# Patient Record
Sex: FEMALE | Race: BLACK OR AFRICAN AMERICAN | NOT HISPANIC OR LATINO | Employment: FULL TIME | URBAN - METROPOLITAN AREA
[De-identification: names, ages, dates, MRNs, and addresses within clinical notes are randomized per-mention and may not be internally consistent; named-entity substitution may affect disease eponyms.]

---

## 2018-03-09 ENCOUNTER — TELEPHONE (OUTPATIENT)
Dept: FAMILY MEDICINE | Facility: CLINIC | Age: 28
End: 2018-03-09

## 2018-03-09 NOTE — TELEPHONE ENCOUNTER
----- Message from Melody Mckenzie MD sent at 3/9/2018 12:00 PM CST -----  Contact: self/[phone]      ----- Message -----  From: Jes Walker MA  Sent: 3/8/2018  11:14 AM  To: Melody Mckenzie MD        ----- Message -----  From: Iesha Elizabeth  Sent: 3/8/2018  10:43 AM  To: Jonah Oliver A Staff    Patient called to become a new patient of Dr. Mckenzie's.      Please call and advise.

## 2018-03-14 ENCOUNTER — OFFICE VISIT (OUTPATIENT)
Dept: FAMILY MEDICINE | Facility: CLINIC | Age: 28
End: 2018-03-14
Payer: COMMERCIAL

## 2018-03-14 VITALS
TEMPERATURE: 98 F | HEIGHT: 67 IN | HEART RATE: 100 BPM | DIASTOLIC BLOOD PRESSURE: 78 MMHG | OXYGEN SATURATION: 97 % | SYSTOLIC BLOOD PRESSURE: 118 MMHG | WEIGHT: 212.75 LBS | BODY MASS INDEX: 33.39 KG/M2

## 2018-03-14 DIAGNOSIS — M25.571 CHRONIC PAIN OF RIGHT ANKLE: ICD-10-CM

## 2018-03-14 DIAGNOSIS — F32.81 PREMENSTRUAL DYSPHORIC DISORDER: ICD-10-CM

## 2018-03-14 DIAGNOSIS — G89.29 CHRONIC PAIN OF RIGHT ANKLE: ICD-10-CM

## 2018-03-14 DIAGNOSIS — Z00.00 ANNUAL PHYSICAL EXAM: Primary | ICD-10-CM

## 2018-03-14 PROCEDURE — 99999 PR PBB SHADOW E&M-EST. PATIENT-LVL IV: CPT | Mod: PBBFAC,,, | Performed by: FAMILY MEDICINE

## 2018-03-14 PROCEDURE — 99385 PREV VISIT NEW AGE 18-39: CPT | Mod: S$GLB,,, | Performed by: FAMILY MEDICINE

## 2018-03-14 NOTE — PATIENT INSTRUCTIONS
"  Managing PMS: Diet and Nutrition     Nutrients in fresh fruits and vegetables can help you manage PMS.     Maintaining a healthy diet helps your body counter PMS. Certain foods boost serotonin levels and give you the energy to cope with symptoms. Other foods can be avoided to ease symptoms.  About supplements  Ask your healthcare provider about supplements before trying them.   Benefits of a balanced diet  To counter PMS symptoms, maintain a balanced diet. Eat foods from all the food groups: dairy, grains, fruits and vegetables, and protein. When planning meals, know that:  · Calcium may ease mood swings, headache, bloating, and irritability. It's found in dairy products such as milk, cheese, and yogurt. Some juices, breads, cereals, and soy products have calcium added (fortified).  · Magnesium may relieve bloating and breast tenderness. It's found in many foods, including fresh fruits and vegetables. To help your body get enough magnesium, eat 5 or more servings of a variety of fruits and vegetables a day.  · Vitamin B6 helps the body use serotonin, thereby helping to ease depression. It's found in chicken, fish, potatoes, eggs, and carrots.  · Vitamin E may reduce headache and breast tenderness. It's found in nuts such as almonds, peanuts, and hazelnuts. It's also found in green leafy vegetables.  "Good mood" foods  Eating foods high in carbohydrates (carbs) and fiber can help you manage PMS. That's because carbs raise serotonin levels. Carbs are also your body's main source of energy. To help keep energy and serotonin levels steady, eat small amounts throughout the day. High-fiber carbs include:  · Whole-grain foods. Brown rice, whole-wheat pasta, whole-grain bread, and buckwheat noodles are good choices.  · Fresh fruits and vegetables, especially when eaten unpeeled.  · Beans and legumes, such as kidney beans, peas, and lentils.  Foods to limit  Some foods can make PMS symptoms worse. Know that:  · Salt can " cause bloating. Since canned vegetables are often high in salt, buy fresh instead. Flavor with herbs, lemon, or salt-free seasonings.  · Sugar is a carb that provides only short bursts of energy. If you crave sugar, choose a food that's also high in fiber, like an unpeeled apple or a bran muffin.  · Caffeine can disrupt sleep, which makes symptoms harder to cope with. Caffeine can also cause breast tenderness. Try to limit chocolate and caffeinated drinks, such as coffee or soda.  · Alcohol can make you feel depressed and can disrupt sleep. Many kinds of alcohol are also high in sugar. You may try limiting the amount of alcohol you drink.  Date Last Reviewed: 3/1/2017  © 3838-5099 [employer]. [address] [place] [address], [place] [address]. All rights reserved. This information is not intended as a substitute for professional medical care. Always follow your healthcare professional's instructions.        Managing PMS: Lifestyle Changes    Coping with PMS takes energy. But, PMS symptoms can make you feel like you dont have the strength to cope. The trick is to work helpful strategies into your daily life. Be active during the day and get enough sleep at night. Take time to relax. And dont be afraid to ask for support.  Being active  · Activity raises the amount of oxygen in your body. This makes you feel better and gives you more energy. Exercise may also raise serotonin levels. For best results:  ¨ Try aerobic activities, such as walking, jogging, biking, swimming, or yoga.  ¨ Exercise for 30 minutes, most days of the week. If this seems like too much, start with 10 minutes a day and work your way up.  ¨ Find ways to fit activity into your day. Try taking the stairs instead of the elevator.  Sleeping well  When youre tired, PMS symptoms can be harder to cope with. Aim for at least 8 hours of sleep a night. To sleep better:  · Follow a routine before bed. For instance, brush your teeth, read for half an  hour, and turn out the lights at 10 p.m. every night.  · Pull down window shades and keep pets out of the bedroom. If youre a light sleeper, try wearing earplugs and an eye mask to block out noise and light.  Taking time to relax  Being relaxed can give you the energy to deal with lifes ups and downs. This makes even PMS symptoms easier to cope with. Learn to relax through simple techniques you can do anytime, anywhere. If you think youre too busy, start with just 5 minutes a day. Try:  · Taking in a slow, deep breath through your nose. Hold it for 5 counts, then exhale through your mouth. Repeat this 3 times.  · Picturing yourself in a peaceful place, such as the countryside. Explore with your mind. Hear birds. Smell freshly cut grass. Enjoy a mental vacation.  · Stretching to relax muscles and reduce aches. (If you have back problems, ask your healthcare provider about stretches that are safe for you.)  Finding support  You dont have to deal with PMS alone. To help you cope:  · Talk to family, friends, and coworkers.  · Let them know how they can help when youre dealing with PMS symptoms.  · Chat with female friends. Support each other. You may learn some new coping strategies.  · Join a support group for women with PMS. Or try a stress management group. Ask your healthcare provider for resources.  Date Last Reviewed: 2/1/2017  © 2775-1415 [employer]. [address] [place] [address], [place] [address]. All rights reserved. This information is not intended as a substitute for professional medical care. Always follow your healthcare professional's instructions.

## 2018-03-14 NOTE — PROGRESS NOTES
Subjective:       Patient ID: Dora Zamarripa is a 28 y.o. female.    Chief Complaint: Establish Care    HPI   29 yo female pt with obesity presents to establish care and for an annual exam. LMP 3/4/18. Menses q month x 2 years. Prior to birth of her child 2 years ago, had irregular menses. Pt has PCOS and was treated with Metformin. . Is sexually active with her . Has a 2 year old son. Last Pap smear 2 years ago. Is followed by Dr. Radha Short for gyn care. Next visit 2018. Is not using contraception.  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 Would like to pursue a career in dentistry.  Pt has history of migraines. No recent migraine headaches.  Pt notes intermittent challenges with her right ankle. Has pain with prolonged standing. Does not take any medications for this evaluation. Has been evaluated by Podiatry, Ortho and Sports Medicine for the right ankle problems. No relief with an ankle brace.  Social History: No tobacco. Occasional ETOH. No drug use.  Health Maintenance: received Tdap   Review of Systems   Constitutional: Negative for activity change, appetite change, chills, fever and unexpected weight change.   HENT: Positive for ear pain. Negative for congestion, hearing loss, postnasal drip, rhinorrhea, sneezing and trouble swallowing.    Eyes: Negative for pain, discharge, redness, itching and visual disturbance.   Respiratory: Positive for cough. Negative for shortness of breath and wheezing.         Intermittent cough since URI 2 weeks ago.   Cardiovascular: Negative for chest pain, palpitations and leg swelling.   Gastrointestinal: Negative for abdominal pain, anal bleeding, blood in stool, constipation, diarrhea, nausea and vomiting.   Endocrine: Negative for cold intolerance, heat intolerance, polydipsia, polyphagia and polyuria.   Genitourinary: Positive for menstrual problem. Negative for dysuria, genital sores, hematuria, vaginal bleeding, vaginal discharge  and vaginal pain.        Has decreased activity, motivation and appetite on the first 2 days of each menses. Often has to miss work due to her symptoms.   Musculoskeletal: Positive for arthralgias and joint swelling. Negative for myalgias.   Allergic/Immunologic: Negative for environmental allergies, food allergies and immunocompromised state.   Neurological: Positive for headaches. Negative for dizziness, seizures, syncope, weakness and light-headedness.   Psychiatric/Behavioral: Positive for dysphoric mood. Negative for self-injury, sleep disturbance and suicidal ideas. The patient is nervous/anxious.        Objective:      Physical Exam   Constitutional: She is oriented to person, place, and time. She appears well-developed and well-nourished. No distress.   HENT:   Head: Normocephalic and atraumatic.   Right Ear: Hearing, tympanic membrane, external ear and ear canal normal.   Left Ear: Hearing, tympanic membrane, external ear and ear canal normal.   Nose: Nose normal. Right sinus exhibits no maxillary sinus tenderness and no frontal sinus tenderness. Left sinus exhibits no maxillary sinus tenderness and no frontal sinus tenderness.   Mouth/Throat: Uvula is midline, oropharynx is clear and moist and mucous membranes are normal.   Eyes: Conjunctivae and EOM are normal. Pupils are equal, round, and reactive to light. Right eye exhibits no discharge. Left eye exhibits no discharge.   Neck: Normal range of motion. Neck supple. No JVD present. No thyromegaly present.   Cardiovascular: Normal rate, regular rhythm, normal heart sounds and intact distal pulses.    No murmur heard.  Pulmonary/Chest: Effort normal and breath sounds normal. She has no wheezes. She has no rales.   Abdominal: Soft. Bowel sounds are normal. She exhibits no mass. There is no tenderness.   Musculoskeletal: Normal range of motion. She exhibits no edema or deformity.   Lymphadenopathy:     She has no cervical adenopathy.   Neurological: She is  alert and oriented to person, place, and time. She has normal strength and normal reflexes. No sensory deficit.   Skin: Skin is warm and dry.   Psychiatric: She has a normal mood and affect.   Vitals reviewed.      Assessment:         See plan  Plan:       Dora was seen today for establish care.    Diagnoses and all orders for this visit:    Annual physical exam  -     TSH; Future  -     CBC auto differential; Future  -     Lipid panel; Future  -     Comprehensive metabolic panel; Future    BMI 33.0-33.9,adult  Weight loss advised. Dietary and exercise counseling done. Pt to target 10% weight loss.    Chronic pain of right ankle  -     Ambulatory referral to Orthopedics (Dr. Akhtar)    Premenstrual dysphoric disorder  - Pt defers starting an SSRI for treatment of PMDD. Pt to discuss treatment with Gyn, Dr. Short at her visit in April 2018. Diet and lifestyle changes discussed with patient.    F/U in 3 months.

## 2018-03-19 ENCOUNTER — OFFICE VISIT (OUTPATIENT)
Dept: URGENT CARE | Facility: CLINIC | Age: 28
End: 2018-03-19
Payer: COMMERCIAL

## 2018-03-19 VITALS
OXYGEN SATURATION: 97 % | BODY MASS INDEX: 33.12 KG/M2 | HEART RATE: 97 BPM | SYSTOLIC BLOOD PRESSURE: 106 MMHG | DIASTOLIC BLOOD PRESSURE: 73 MMHG | RESPIRATION RATE: 19 BRPM | WEIGHT: 211 LBS | TEMPERATURE: 98 F | HEIGHT: 67 IN

## 2018-03-19 DIAGNOSIS — R31.9 URINARY TRACT INFECTION WITH HEMATURIA, SITE UNSPECIFIED: Primary | ICD-10-CM

## 2018-03-19 DIAGNOSIS — N39.0 URINARY TRACT INFECTION WITH HEMATURIA, SITE UNSPECIFIED: Primary | ICD-10-CM

## 2018-03-19 LAB
B-HCG UR QL: NEGATIVE
BILIRUB UR QL STRIP: NEGATIVE
CTP QC/QA: YES
GLUCOSE UR QL STRIP: NEGATIVE
KETONES UR QL STRIP: NEGATIVE
LEUKOCYTE ESTERASE UR QL STRIP: POSITIVE
PH, POC UA: 7.5 (ref 5–8)
POC BLOOD, URINE: POSITIVE
POC NITRATES, URINE: NEGATIVE
PROT UR QL STRIP: POSITIVE
SP GR UR STRIP: 1.01 (ref 1–1.03)
UROBILINOGEN UR STRIP-ACNC: NORMAL (ref 0.1–1.1)

## 2018-03-19 PROCEDURE — 99214 OFFICE O/P EST MOD 30 MIN: CPT | Mod: 25,S$GLB,, | Performed by: NURSE PRACTITIONER

## 2018-03-19 PROCEDURE — 81003 URINALYSIS AUTO W/O SCOPE: CPT | Mod: QW,S$GLB,, | Performed by: NURSE PRACTITIONER

## 2018-03-19 PROCEDURE — 81025 URINE PREGNANCY TEST: CPT | Mod: S$GLB,,, | Performed by: NURSE PRACTITIONER

## 2018-03-19 RX ORDER — NITROFURANTOIN 25; 75 MG/1; MG/1
100 CAPSULE ORAL 2 TIMES DAILY
Qty: 14 CAPSULE | Refills: 0 | Status: SHIPPED | OUTPATIENT
Start: 2018-03-19 | End: 2018-03-26

## 2018-03-19 NOTE — PROGRESS NOTES
"Subjective:       Patient ID: Dora Zamarripa is a 28 y.o. female.    Vitals:  height is 5' 7" (1.702 m) and weight is 95.7 kg (211 lb). Her oral temperature is 97.6 °F (36.4 °C). Her blood pressure is 106/73 and her pulse is 97. Her respiration is 19 and oxygen saturation is 97%.     Chief Complaint: Urinary Tract Infection    Patient with abdominal pain x 3 days. Pain is in her pelvic area. Patient also with rt side back pain. Patient reports urinary sxs such as dysuria, urinary urgency, and frequency. Patient stating she had chills Saturday night. Patient denies hx of kidney stones but reports hx of kidney infection years ago.       Urinary Tract Infection    This is a new problem. The current episode started acute onset. The problem occurs intermittently. The problem has been gradually worsening. The quality of the pain is described as burning. Associated symptoms include chills, frequency and urgency. Pertinent negatives include no hematuria, nausea or vomiting. Treatments tried: Azo. The treatment provided mild relief.     Review of Systems   Constitution: Positive for chills. Negative for fever.   Skin: Negative for itching.   Musculoskeletal: Positive for back pain.   Gastrointestinal: Positive for abdominal pain. Negative for nausea and vomiting.   Genitourinary: Positive for dysuria, frequency and urgency. Negative for genital sores, hematuria, missed menses and non-menstrual bleeding.       Objective:      Physical Exam   Constitutional: She is oriented to person, place, and time. She appears well-developed and well-nourished.   HENT:   Head: Normocephalic and atraumatic.   Right Ear: External ear normal.   Left Ear: External ear normal.   Nose: Nose normal. No nasal deformity. No epistaxis.   Mouth/Throat: Oropharynx is clear and moist and mucous membranes are normal.   Eyes: Conjunctivae and lids are normal. Pupils are equal, round, and reactive to light.   Neck: Trachea normal, normal range of " motion and phonation normal. Neck supple.   Cardiovascular: Normal rate, regular rhythm, normal heart sounds and normal pulses.    Pulmonary/Chest: Effort normal and breath sounds normal.   Abdominal: Soft. Normal appearance and bowel sounds are normal. She exhibits no distension and no mass. There is no tenderness. There is CVA tenderness (right side). There is no rebound, no guarding, no tenderness at McBurney's point and negative Andrews's sign.   Musculoskeletal: Normal range of motion.   Neurological: She is alert and oriented to person, place, and time.   Skin: Skin is warm, dry and intact.   Psychiatric: She has a normal mood and affect. Her speech is normal and behavior is normal. Cognition and memory are normal.   Nursing note and vitals reviewed.      Pt has back pain, not abd pain  Assessment:       1. Urinary tract infection with hematuria, site unspecified        Plan:         Urinary tract infection with hematuria, site unspecified  -     POCT Urinalysis, Dipstick, Automated, W/O Scope  -     POCT urine pregnancy  -     nitrofurantoin, macrocrystal-monohydrate, (MACROBID) 100 MG capsule; Take 1 capsule (100 mg total) by mouth 2 (two) times daily.  Dispense: 14 capsule; Refill: 0  -     Urine culture      Results for orders placed or performed in visit on 03/19/18   POCT Urinalysis, Dipstick, Automated, W/O Scope   Result Value Ref Range    POC Blood, Urine Positive (A) Negative    POC Bilirubin, Urine Negative Negative    POC Urobilinogen, Urine normal 0.1 - 1.1    POC Ketones, Urine Negative Negative    POC Protein, Urine Positive (A) Negative    POC Nitrates, Urine Negative Negative    POC Glucose, Urine Negative Negative    pH, UA 7.5 5 - 8    POC Specific Gravity, Urine 1.010 1.003 - 1.029    POC Leukocytes, Urine Positive (A) Negative   POCT urine pregnancy   Result Value Ref Range    POC Preg Test, Ur Negative Negative     Acceptable Yes

## 2018-03-22 ENCOUNTER — OFFICE VISIT (OUTPATIENT)
Dept: URGENT CARE | Facility: CLINIC | Age: 28
End: 2018-03-22
Payer: COMMERCIAL

## 2018-03-22 VITALS
HEART RATE: 79 BPM | RESPIRATION RATE: 18 BRPM | WEIGHT: 211 LBS | OXYGEN SATURATION: 98 % | TEMPERATURE: 98 F | BODY MASS INDEX: 33.12 KG/M2 | DIASTOLIC BLOOD PRESSURE: 80 MMHG | HEIGHT: 67 IN | SYSTOLIC BLOOD PRESSURE: 111 MMHG

## 2018-03-22 DIAGNOSIS — A08.4 VIRAL GASTROENTERITIS: Primary | ICD-10-CM

## 2018-03-22 LAB
B-HCG UR QL: NEGATIVE
BACTERIA UR CULT: ABNORMAL
BACTERIA UR CULT: ABNORMAL
BILIRUB UR QL STRIP: NEGATIVE
CTP QC/QA: YES
GLUCOSE UR QL STRIP: NEGATIVE
KETONES UR QL STRIP: NEGATIVE
LEUKOCYTE ESTERASE UR QL STRIP: NEGATIVE
PH, POC UA: 5.5 (ref 5–8)
POC BLOOD, URINE: NEGATIVE
POC NITRATES, URINE: NEGATIVE
PROT UR QL STRIP: NEGATIVE
SP GR UR STRIP: 1.02 (ref 1–1.03)
UROBILINOGEN UR STRIP-ACNC: NORMAL (ref 0.1–1.1)

## 2018-03-22 PROCEDURE — 81003 URINALYSIS AUTO W/O SCOPE: CPT | Mod: QW,S$GLB,, | Performed by: PHYSICIAN ASSISTANT

## 2018-03-22 PROCEDURE — 81025 URINE PREGNANCY TEST: CPT | Mod: S$GLB,,, | Performed by: PHYSICIAN ASSISTANT

## 2018-03-22 PROCEDURE — 99214 OFFICE O/P EST MOD 30 MIN: CPT | Mod: 25,S$GLB,, | Performed by: PHYSICIAN ASSISTANT

## 2018-03-22 RX ORDER — ONDANSETRON 4 MG/1
4 TABLET, FILM COATED ORAL EVERY 8 HOURS PRN
Qty: 15 TABLET | Refills: 0 | Status: SHIPPED | OUTPATIENT
Start: 2018-03-22 | End: 2018-03-25

## 2018-03-22 RX ORDER — DICYCLOMINE HYDROCHLORIDE 20 MG/1
20 TABLET ORAL 3 TIMES DAILY PRN
Qty: 15 TABLET | Refills: 0 | Status: SHIPPED | OUTPATIENT
Start: 2018-03-22 | End: 2019-01-11 | Stop reason: ALTCHOICE

## 2018-03-22 NOTE — PROGRESS NOTES
"Subjective:       Patient ID: Dora Hernandez is a 28 y.o. female.    Vitals:    03/22/18 1445   BP: 111/80   Pulse: 79   Resp: 18   Temp: 98.4 °F (36.9 °C)   TempSrc: Oral   SpO2: 98%   Weight: 95.7 kg (211 lb)   Height: 5' 7" (1.702 m)     Chief Complaint: Flank Pain    [place] and was dx with a UTI.  Pt was given macrobid.  Pt has been taking tylenol for the pain.  Pt states this morning she started vomiting, nausea, dizziness, and diarrhea. She also states she is still having flank pain. Pt states her son had a stomach bug last week.      Emesis    This is a new problem. The current episode started today. The problem occurs 2 to 4 times per day. The problem has been gradually worsening. The emesis has an appearance of bile and stomach contents. There has been no fever. The fever has been present for less than 1 day. Associated symptoms include abdominal pain, diarrhea and dizziness. Pertinent negatives include no arthralgias, chest pain, chills, coughing, fever, headaches, myalgias, sweats or URI. Risk factors: UTI. Treatments tried: abx, tylenol. The treatment provided no relief.     Review of Systems   Constitution: Positive for decreased appetite. Negative for chills, diaphoresis, fever and malaise/fatigue.   HENT: Negative for congestion, ear pain and sore throat.    Eyes: Negative for blurred vision.   Cardiovascular: Negative for chest pain and palpitations.   Respiratory: Negative for cough, shortness of breath, sputum production and wheezing.    Skin: Negative for color change and rash.   Musculoskeletal: Positive for back pain. Negative for arthralgias, joint pain and myalgias.   Gastrointestinal: Positive for abdominal pain, change in bowel habit (diarrhea), diarrhea, nausea and vomiting. Negative for constipation, hematemesis and hematochezia.   Genitourinary: Positive for flank pain. Negative for dysuria, frequency, genital sores, hematuria, pelvic pain " and urgency.   Neurological: Positive for dizziness. Negative for headaches and light-headedness.   Psychiatric/Behavioral: The patient is not nervous/anxious.        Objective:      Physical Exam   Constitutional: She is oriented to person, place, and time. Vital signs are normal. She appears well-developed and well-nourished. She does not appear ill. No distress.   HENT:   Head: Normocephalic and atraumatic.   Right Ear: External ear normal.   Left Ear: External ear normal.   Nose: Nose normal.   Eyes: Conjunctivae, EOM and lids are normal. Right eye exhibits no discharge. Left eye exhibits no discharge.   Neck: Normal range of motion. Neck supple.   Cardiovascular: Normal rate, regular rhythm and normal heart sounds.  Exam reveals no gallop and no friction rub.    No murmur heard.  Pulmonary/Chest: Effort normal and breath sounds normal. No respiratory distress. She has no decreased breath sounds. She has no wheezes. She has no rhonchi. She has no rales.   Abdominal: Normal appearance and bowel sounds are normal. There is no tenderness. There is CVA tenderness. There is no rigidity, no rebound, no guarding, no tenderness at McBurney's point and negative Andrews's sign.   Musculoskeletal: Normal range of motion.        Cervical back: Normal.        Thoracic back: She exhibits tenderness (right sided). She exhibits normal range of motion, no bony tenderness, no swelling, no edema and no spasm.        Lumbar back: Normal.   Neurological: She is alert and oriented to person, place, and time.   Skin: Skin is warm and dry. No rash noted. She is not diaphoretic. No erythema. No pallor.   Psychiatric: She has a normal mood and affect. Her behavior is normal.   Nursing note and vitals reviewed.      Assessment:       1. Viral gastroenteritis        Office Visit on 03/22/2018   Component Date Value Ref Range Status    POC Blood, Urine 03/22/2018 Negative  Negative Final    POC Bilirubin, Urine 03/22/2018 Negative   Negative Final    POC Urobilinogen, Urine 03/22/2018 normal  0.1 - 1.1 Final    POC Ketones, Urine 03/22/2018 Negative  Negative Final    POC Protein, Urine 03/22/2018 Negative  Negative Final    POC Nitrates, Urine 03/22/2018 Negative  Negative Final    POC Glucose, Urine 03/22/2018 Negative  Negative Final    pH, UA 03/22/2018 5.5  5 - 8 Final    POC Specific Gravity, Urine 03/22/2018 1.025  1.003 - 1.029 Final    POC Leukocytes, Urine 03/22/2018 Negative  Negative Final    POC Preg Test, Ur 03/22/2018 Negative  Negative Final     Acceptable 03/22/2018 Yes   Final     Plan:     Urine culture from visit 2 days ago states pt was treated with the appropriate antibiotic. UA today is normal and patient's vitals are normal. I believe back tenderness is musculoskeletal. I also believe pt's n/v/d are unrelated to recent UTI. Based on exposure to vital gastroenteritis from son, treating patient accordingly. Discussed symptoms for which patient should return to clinic or report to the emergency room. Discussed treatment options with patient. Patient expressed verbal understanding and agreement with treatment plan.     Dora was seen today for flank pain.    Diagnoses and all orders for this visit:    Viral gastroenteritis  -     POCT Urinalysis, Dipstick, Automated, W/O Scope  -     POCT urine pregnancy  -     ondansetron (ZOFRAN) 4 MG tablet; Take 1 tablet (4 mg total) by mouth every 8 (eight) hours as needed for Nausea.  -     dicyclomine (BENTYL) 20 mg tablet; Take 1 tablet (20 mg total) by mouth 3 (three) times daily as needed.      Patient Instructions   -Take zofran as needed for nausea and bentyl as needed for abdominal cramping.  -Please follow up with your primary care provider or return to clinic for any new or worsening symptoms.  -Stay hydrated!    Please follow up with your primary care provider within 2-5 days if your signs and symptoms have not resolved or worsen.     If your  condition worsens or fails to improve we recommend that you receive another evaluation at the emergency room immediately or contact your primary medical clinic to discuss your concerns.   You must understand that you have received an Urgent Care treatment only and that you may be released before all of your medical problems are known or treated. You, the patient, will arrange for follow up care as instructed.         Viral Gastroenteritis (Adult)    Gastroenteritis is commonly called the stomach flu. It is most often caused by a virus that affects the stomach and intestinal tract and usually lasts from 2 to 7 days. Common viruses causing gastroenteritis include norovirus, rotavirus, and hepatitis A. Non-viral causes of gastroenteritis include bacteria, parasites, and toxins.  The danger from repeated vomiting or diarrhea is dehydration. This is the loss of too much fluid from the body. When this occurs, body fluids must be replaced. Antibiotics do not help with this illness because it is usually viral.Simple home treatment will be helpful.  Symptoms of viral gastroenteritis may include:  · Watery, loose stools  · Stomach pain or abdominal cramps  · Fever and chills  · Nausea and vomiting  · Loss of bowel control  · Headache  Home care  Gastroenteritis is transmitted by contact with the stool or vomit of an infected person. This can occur from person to person or from contact with a contaminated surface.  Follow these guidelines when caring for yourself at home:  · If symptoms are severe, rest at home for the next 24 hours or until you are feeling better.  · Wash your hands with soap and water or use alcohol-based  to prevent the spread of infection. Wash your hands after touching anyone who is sick.  · Wash your hands or use alcohol-based  after using the toilet and before meals. Clean the toilet after each use.  Remember these tips when preparing food:  · People with diarrhea should not prepare or  serve food to others. When preparing foods, wash your hands before and after.  · Wash your hands after using cutting boards, countertops, knives, or utensils that have been in contact with raw food.  · Keep uncooked meats away from cooked and ready-to-eat foods.  Medicine  You may use acetaminophen or NSAID medicines like ibuprofen or naproxen to control fever unless another medicine was given. If you have chronic liver or kidney disease, talk with your healthcare provider before using these medicines. Also talk with your provider if you've had a stomach ulcer or gastrointestinal bleeding. Don't give aspirin to anyone under 18 years of age who is ill with a fever. It may cause severe liver damage. Don't use NSAIDS is you are already taking one for another condition (like arthritis) or are on aspirin (such as for heart disease or after a stroke).  If medicine for vomiting or diarrhea are prescribed, take these only as directed. Do not take over-the-counter medicines for vomiting or diarrhea unless instructed by your healthcare provider.  Diet  Follow these guidelines for food:  · Water and liquids are important so you don't get dehydrated. Drink a small amount at a time or suck on ice chips if you are vomiting.  · If you eat, avoid fatty, greasy, spicy, or fried foods.  · Don't eat dairy if you have diarrhea. This can make diarrhea worse.  · Avoid tobacco, alcohol, and caffeine which may worsen symptoms.  During the first 24 hours (the first full day), follow the diet below:  · Beverages. Sports drinks, soft drinks without caffeine, ginger ale, mineral water (plain or flavored), decaffeinated tea and coffee. If you are very dehydrated, sports drinks aren't a good choice. They have too much sugar and not enough electrolytes. In this case, commercially available products called oral rehydration solutions, are best.  · Soups. Eat clear broth, consommé, and bouillon.  · Desserts. Eat gelatin, popsicles, and fruit juice  bars.  During the next 24 hours (the second day), you may add the following to the above:  · Hot cereal, plain toast, bread, rolls, and crackers  · Plain noodles, rice, mashed potatoes, chicken noodle or rice soup  · Unsweetened canned fruit (avoid pineapple), bananas  · Limit fat intake to less than 15 grams per day. Do this by avoiding margarine, butter, oils, mayonnaise, sauces, gravies, fried foods, peanut butter, meat, poultry, and fish.  · Limit fiber and avoid raw or cooked vegetables, fresh fruits (except bananas), and bran cereals.  · Limit caffeine and chocolate. Don't use spices or seasonings other than salt.  · Limit dairy products.  · Avoid alcohol.  During the next 24 hours:  · Gradually resume a normal diet as you feel better and your symptoms improve.  · If at any time it starts getting worse again, go back to clear liquids until you feel better.  Follow-up care  Follow up with your healthcare provider, or as advised. Call your provider if you don't get better within 24 hours or if diarrhea lasts more than a week. Also follow up if you are unable to keep down liquids and get dehydrated. If a stool (diarrhea) sample was taken, call as directed for the results.  Call 911  Call 911 if any of these occur:  · Trouble breathing  · Chest pain  · Confused  · Severe drowsiness or trouble awakening  · Fainting or loss of consciousness  · Rapid heart rate  · Seizure  · Stiff neck  When to seek medical advice  Call your healthcare provider right away if any of these occur:  · Abdominal pain that gets worse  · Continued vomiting (unable to keep liquids down)  · Frequent diarrhea (more than 5 times a day)  · Blood in vomit or stool (black or red color)  · Dark urine, reduced urine output, or extreme thirst  · Weakness or dizziness  · Drowsiness  · Fever of 100.4°F (38°C) oral or higher that does not get better with fever medicine  · New rash  Date Last Reviewed: 1/3/2016  © 6826-1502 [employer] [address]  [place] [address]. All rights reserved. This information is not intended as a substitute for professional medical care. Always follow your healthcare professional's instructions.

## 2018-03-22 NOTE — LETTER
March 22, 2018      [facility] [address] [place]  [facility] [address]  Phone: [phone]  Fax: [phone]       Patient: Dora Hernandez   YOB: 1990  Date of Visit: 03/22/2018    To Whom It May Concern:    Charmaine Hernandez  was at [facility] on 03/22/2018. She may return to work/school on 3/24/2018 with no restrictions. If you have any questions or concerns, or if I can be of further assistance, please do not hesitate to contact me.    Sincerely,    Nicole Apple PA-C

## 2018-03-22 NOTE — PATIENT INSTRUCTIONS
-Take zofran as needed for nausea and bentyl as needed for abdominal cramping.  -Please follow up with your primary care provider or return to clinic for any new or worsening symptoms.  -Stay hydrated!    Please follow up with your primary care provider within 2-5 days if your signs and symptoms have not resolved or worsen.     If your condition worsens or fails to improve we recommend that you receive another evaluation at the emergency room immediately or contact your primary medical clinic to discuss your concerns.   You must understand that you have received an Urgent Care treatment only and that you may be released before all of your medical problems are known or treated. You, the patient, will arrange for follow up care as instructed.         Viral Gastroenteritis (Adult)    Gastroenteritis is commonly called the stomach flu. It is most often caused by a virus that affects the stomach and intestinal tract and usually lasts from 2 to 7 days. Common viruses causing gastroenteritis include norovirus, rotavirus, and hepatitis A. Non-viral causes of gastroenteritis include bacteria, parasites, and toxins.  The danger from repeated vomiting or diarrhea is dehydration. This is the loss of too much fluid from the body. When this occurs, body fluids must be replaced. Antibiotics do not help with this illness because it is usually viral.Simple home treatment will be helpful.  Symptoms of viral gastroenteritis may include:  · Watery, loose stools  · Stomach pain or abdominal cramps  · Fever and chills  · Nausea and vomiting  · Loss of bowel control  · Headache  Home care  Gastroenteritis is transmitted by contact with the stool or vomit of an infected person. This can occur from person to person or from contact with a contaminated surface.  Follow these guidelines when caring for yourself at home:  · If symptoms are severe, rest at home for the next 24 hours or until you are feeling better.  · Wash your hands with soap  and water or use alcohol-based  to prevent the spread of infection. Wash your hands after touching anyone who is sick.  · Wash your hands or use alcohol-based  after using the toilet and before meals. Clean the toilet after each use.  Remember these tips when preparing food:  · People with diarrhea should not prepare or serve food to others. When preparing foods, wash your hands before and after.  · Wash your hands after using cutting boards, countertops, knives, or utensils that have been in contact with raw food.  · Keep uncooked meats away from cooked and ready-to-eat foods.  Medicine  You may use acetaminophen or NSAID medicines like ibuprofen or naproxen to control fever unless another medicine was given. If you have chronic liver or kidney disease, talk with your healthcare provider before using these medicines. Also talk with your provider if you've had a stomach ulcer or gastrointestinal bleeding. Don't give aspirin to anyone under 18 years of age who is ill with a fever. It may cause severe liver damage. Don't use NSAIDS is you are already taking one for another condition (like arthritis) or are on aspirin (such as for heart disease or after a stroke).  If medicine for vomiting or diarrhea are prescribed, take these only as directed. Do not take over-the-counter medicines for vomiting or diarrhea unless instructed by your healthcare provider.  Diet  Follow these guidelines for food:  · Water and liquids are important so you don't get dehydrated. Drink a small amount at a time or suck on ice chips if you are vomiting.  · If you eat, avoid fatty, greasy, spicy, or fried foods.  · Don't eat dairy if you have diarrhea. This can make diarrhea worse.  · Avoid tobacco, alcohol, and caffeine which may worsen symptoms.  During the first 24 hours (the first full day), follow the diet below:  · Beverages. Sports drinks, soft drinks without caffeine, ginger ale, mineral water (plain or flavored),  decaffeinated tea and coffee. If you are very dehydrated, sports drinks aren't a good choice. They have too much sugar and not enough electrolytes. In this case, commercially available products called oral rehydration solutions, are best.  · Soups. Eat clear broth, consommé, and bouillon.  · Desserts. Eat gelatin, popsicles, and fruit juice bars.  During the next 24 hours (the second day), you may add the following to the above:  · Hot cereal, plain toast, bread, rolls, and crackers  · Plain noodles, rice, mashed potatoes, chicken noodle or rice soup  · Unsweetened canned fruit (avoid pineapple), bananas  · Limit fat intake to less than 15 grams per day. Do this by avoiding margarine, butter, oils, mayonnaise, sauces, gravies, fried foods, peanut butter, meat, poultry, and fish.  · Limit fiber and avoid raw or cooked vegetables, fresh fruits (except bananas), and bran cereals.  · Limit caffeine and chocolate. Don't use spices or seasonings other than salt.  · Limit dairy products.  · Avoid alcohol.  During the next 24 hours:  · Gradually resume a normal diet as you feel better and your symptoms improve.  · If at any time it starts getting worse again, go back to clear liquids until you feel better.  Follow-up care  Follow up with your healthcare provider, or as advised. Call your provider if you don't get better within 24 hours or if diarrhea lasts more than a week. Also follow up if you are unable to keep down liquids and get dehydrated. If a stool (diarrhea) sample was taken, call as directed for the results.  Call 911  Call 911 if any of these occur:  · Trouble breathing  · Chest pain  · Confused  · Severe drowsiness or trouble awakening  · Fainting or loss of consciousness  · Rapid heart rate  · Seizure  · Stiff neck  When to seek medical advice  Call your healthcare provider right away if any of these occur:  · Abdominal pain that gets worse  · Continued vomiting (unable to keep liquids down)  · Frequent  diarrhea (more than 5 times a day)  · Blood in vomit or stool (black or red color)  · Dark urine, reduced urine output, or extreme thirst  · Weakness or dizziness  · Drowsiness  · Fever of 100.4°F (38°C) oral or higher that does not get better with fever medicine  · New rash  Date Last Reviewed: 1/3/2016  © 1280-7311 [employer]. [address] [place] [address], [place] [address]. All rights reserved. This information is not intended as a substitute for professional medical care. Always follow your healthcare professional's instructions.

## 2018-04-10 ENCOUNTER — OFFICE VISIT (OUTPATIENT)
Dept: OBSTETRICS AND GYNECOLOGY | Facility: CLINIC | Age: 28
End: 2018-04-10
Payer: COMMERCIAL

## 2018-04-10 VITALS
WEIGHT: 205 LBS | BODY MASS INDEX: 32.18 KG/M2 | SYSTOLIC BLOOD PRESSURE: 110 MMHG | DIASTOLIC BLOOD PRESSURE: 70 MMHG | HEIGHT: 67 IN

## 2018-04-10 DIAGNOSIS — Z01.419 ENCOUNTER FOR GYNECOLOGICAL EXAMINATION WITHOUT ABNORMAL FINDING: Primary | ICD-10-CM

## 2018-04-10 PROCEDURE — 99385 PREV VISIT NEW AGE 18-39: CPT | Mod: S$GLB,,, | Performed by: OBSTETRICS & GYNECOLOGY

## 2018-04-10 PROCEDURE — 88175 CYTOPATH C/V AUTO FLUID REDO: CPT

## 2018-04-10 PROCEDURE — 99999 PR PBB SHADOW E&M-EST. PATIENT-LVL II: CPT | Mod: PBBFAC,,, | Performed by: OBSTETRICS & GYNECOLOGY

## 2018-04-10 NOTE — PROGRESS NOTES
HISTORY OF PRESENT ILLNESS:    Dora Hernandez is a 28 y.o. female, , Patient's last menstrual period was 2018.,  presents for a routine exam and has no complaints.  Cycles once a month since delivery. No birth control.     Past Medical History:   Diagnosis Date    Abnormal Pap smear     Migraine headache     Polycystic disease, ovaries        History reviewed. No pertinent surgical history.    MEDICATIONS AND ALLERGIES:      Current Outpatient Prescriptions:     dicyclomine (BENTYL) 20 mg tablet, Take 1 tablet (20 mg total) by mouth 3 (three) times daily as needed., Disp: 15 tablet, Rfl: 0    Review of patient's allergies indicates:   Allergen Reactions    Metformin Nausea And Vomiting       Family History   Problem Relation Age of Onset    Hypertension Father     Sinusitis Mother        Social History     Social History    Marital status:      Spouse name: N/A    Number of children: N/A    Years of education: N/A     Occupational History    Not on file.     Social History Main Topics    Smoking status: Never Smoker    Smokeless tobacco: Never Used    Alcohol use No    Drug use: No    Sexual activity: Yes     Partners: Male     Birth control/ protection: None     Other Topics Concern    Not on file     Social History Narrative    No narrative on file       COMPREHENSIVE GYN HISTORY:  PAP History: Denies abnormal Paps.  Infection History: Denies STDs. Denies PID.  Benign History: Denies uterine fibroids. Denies ovarian cysts. Denies endometriosis. Denies other conditions.  Cancer History: Denies cervical cancer. Denies uterine cancer or hyperplasia. Denies ovarian cancer. Denies vulvar cancer or pre-cancer. Denies vaginal cancer or pre-cancer. Denies breast cancer. Denies colon cancer.  Sexual Activity History: Reports currently being sexually active  Menstrual History: Monthly. Mod then light flow.   Dysmenorrhea History: Reports mild dysmenorrhea.       ROS:  GENERAL: No  "weight changes. No swelling. No fatigue. No fever.  CARDIOVASCULAR: No chest pain. No shortness of breath. No leg cramps.   NEUROLOGICAL: No headaches. No vision changes.  BREASTS: No pain. No lumps. No discharge.  ABDOMEN: No pain. No nausea. No vomiting. No diarrhea. No constipation.  REPRODUCTIVE: No abnormal bleeding.   VULVA: No pain. No lesions. No itching.  VAGINA: No relaxation. No itching. No odor. No discharge. No lesions.  URINARY: No incontinence. No nocturia. No frequency. No dysuria.    /70   Ht 5' 7" (1.702 m)   Wt 93 kg (205 lb 0.4 oz)   LMP 04/06/2018   BMI 32.11 kg/m²     PE:  APPEARANCE: Well nourished, well developed, in no acute distress.  AFFECT: WNL, alert and oriented x 3.  SKIN: No acne or hirsutism.  NECK: Neck symmetric, without masses or thyromegaly.  NODES: No inguinal, cervical, axillary or femoral lymph node enlargement.  CHEST: Good respiratory effort.   ABDOMEN: Soft. No tenderness or masses. No hepatosplenomegaly. No hernias.  BREASTS: Symmetrical, no skin changes, visible lesions, palpable masses or nipple discharge bilaterally.  PELVIC: External female genitalia without lesions.  Female hair distribution. Adequate perineal body, Normal urethral meatus. Vagina moist and well rugated without lesions or discharge.  No significant cystocele or rectocele present. Cervix pink without lesions, discharge or tenderness. Uterus is 4-6 week size, regular, mobile and nontender. Adnexa without masses or tenderness.  EXTREMITIES: No edema    DIAGNOSIS:  1. Encounter for gynecological examination without abnormal finding        PLAN:    Orders Placed This Encounter    Liquid-based pap smear, screening       COUNSELING:  The patient was counseled today on:  -A.C.S. Pap and pelvic exam guidelines (pap every 3 years), recomendations for yearly mammogram;  -to follow up with her PCP for other health maintenance.    FOLLOW-UP with me annually.   "

## 2018-04-13 ENCOUNTER — PATIENT MESSAGE (OUTPATIENT)
Dept: PSYCHIATRY | Facility: CLINIC | Age: 28
End: 2018-04-13

## 2018-04-16 ENCOUNTER — OFFICE VISIT (OUTPATIENT)
Dept: ORTHOPEDICS | Facility: CLINIC | Age: 28
End: 2018-04-16
Payer: COMMERCIAL

## 2018-04-16 ENCOUNTER — HOSPITAL ENCOUNTER (OUTPATIENT)
Dept: RADIOLOGY | Facility: HOSPITAL | Age: 28
Discharge: HOME | End: 2018-04-16
Attending: ORTHOPAEDIC SURGERY
Payer: COMMERCIAL

## 2018-04-16 VITALS
HEART RATE: 70 BPM | HEIGHT: 67 IN | WEIGHT: 205 LBS | SYSTOLIC BLOOD PRESSURE: 119 MMHG | DIASTOLIC BLOOD PRESSURE: 74 MMHG | BODY MASS INDEX: 32.18 KG/M2

## 2018-04-16 DIAGNOSIS — M19.079 ARTHRITIS OF FOOT: ICD-10-CM

## 2018-04-16 DIAGNOSIS — M25.571 RIGHT ANKLE PAIN, UNSPECIFIED CHRONICITY: ICD-10-CM

## 2018-04-16 DIAGNOSIS — M25.571 RIGHT ANKLE PAIN, UNSPECIFIED CHRONICITY: Primary | ICD-10-CM

## 2018-04-16 PROCEDURE — 99203 OFFICE O/P NEW LOW 30 MIN: CPT | Mod: S$GLB,,, | Performed by: ORTHOPAEDIC SURGERY

## 2018-04-16 PROCEDURE — 73610 X-RAY EXAM OF ANKLE: CPT | Mod: TC,RT

## 2018-04-16 PROCEDURE — 99999 PR PBB SHADOW E&M-EST. PATIENT-LVL III: CPT | Mod: PBBFAC,,, | Performed by: ORTHOPAEDIC SURGERY

## 2018-04-16 PROCEDURE — 73610 X-RAY EXAM OF ANKLE: CPT | Mod: 26,RT,, | Performed by: RADIOLOGY

## 2018-04-16 NOTE — LETTER
April 17, 2018      Melody Mckenzie MD  [address] [address]           [facility] - Orthopedics  [address]  [facility] [address]  Phone: [phone]          Patient: Dora Hernandez   MR Number: [id number]   YOB: 1990   Date of Visit: 4/16/2018       Dear Dr. Melody Mckenzie:    Thank you for referring Dora Hernandez to me for evaluation. Attached you will find relevant portions of my assessment and plan of care.    If you have questions, please do not hesitate to call me. I look forward to following Dora Hernandez along with you.    Sincerely,    Surya Akhtar MD    Enclosure  CC:  No Recipients    If you would like to receive this communication electronically, please contact [email or web address] or [phone] to request more information on [employer] Link access.    For providers and/or their staff who would like to refer a patient to [facility], please contact us through our one-stop-shop provider referral line, [facility], at [phone].    If you feel you have received this communication in error or would no longer like to receive these types of communications, please e-mail [email or web address]

## 2018-04-18 NOTE — PROGRESS NOTES
This is a new patient.    CHIEF COMPLAINT:  Right ankle pain.    SUMMARY:  This is a 27-year-old female who works as a health  who has   had a greater than 10-year history of pain in which she describes as a right   ankle.  She does not report any injury.  She states she did not have any remote   injuries in the past.  She states she ran track in high school and college and   did not have any problems.  Nevertheless, this pain has been present for most of   the last 10 years or so.  She was previously working as a  and    and her pain was more severe when she was doing this occupation.  She   has seen other health care providers, but has really never been given a   diagnosis.  She describes one healthcare provider stating that she might have   some type of bony connection which sounds like there may have been some   suspicion of a tarsal coalition.  She reports pain with weightbearing   activities, especially running or jumping and prolonged standing and walking.    She reports the pain is in the lateral aspect of her hindfoot and not   necessarily over the ankle based on where she is pointing.    PAST MEDICAL HISTORY:  Otherwise, noncontributory.    SOCIAL HISTORY:  Tobacco use, not reported.  Alcohol use, not reported.    MEDICATIONS:  Dicyclomine.    ALLERGIES:  Metformin.    REVIEW OF SYSTEMS:  Negative for any chest pain, shortness of breath, fevers or   weight loss.    PHYSICAL EXAMINATION:  GENERAL:  This is a well-developed, well-nourished, 5 feet 7 inches, 205-pound   female with a pulse of 70 who walks in with a normal gait.  EXTREMITIES:  On standing inspection, she has plantigrade alignment of her feet   with well-maintained arches.  There is no gross swelling noticeable in the right   ankle or hindfoot.  She is able to go up on her toes without difficulty.  On   sitting exam, she has full motion of her ankle joint.  She also has good motion   of her subtalar joint, but there is  some discomfort with subtalar motion.  She   has good strength and no pain on resistance to any of the tendons about her   ankle and hindfoot.  She has good distal pulses.  She has normal sensation.    IMAGING:  I ordered an x-ray of her ankle.  The ankle joint itself is well   maintained without any abnormalities.  There is some irregularity of the   subtalar joint.  On the AP view of the ankle, the medial and anterior facets and   sustentaculum are visible and appears to be some joint space narrowing in this   area.    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    RECOMMENDATIONS:  Treatment options were discussed with Ms. Hernandez.  I would   like to obtain a CT scan to evaluate the subtalar joint.  I am going to have her   return to see me the results and make further recommendations at that time.      RUBEN/NGHIA  dd: 04/17/2018 10:25:01 (CDT)  td: 04/18/2018 08:09:51 (CDT)  Doc ID   #[id number]  Job ID #[id number]    CC:

## 2018-05-10 ENCOUNTER — OFFICE VISIT (OUTPATIENT)
Dept: PSYCHIATRY | Facility: CLINIC | Age: 28
End: 2018-05-10
Payer: COMMERCIAL

## 2018-05-10 DIAGNOSIS — F33.1 MODERATE EPISODE OF RECURRENT MAJOR DEPRESSIVE DISORDER: ICD-10-CM

## 2018-05-10 DIAGNOSIS — F41.1 GAD (GENERALIZED ANXIETY DISORDER): ICD-10-CM

## 2018-05-10 DIAGNOSIS — F32.81 PREMENSTRUAL DYSPHORIC DISORDER: Primary | ICD-10-CM

## 2018-05-10 PROCEDURE — 90791 PSYCH DIAGNOSTIC EVALUATION: CPT | Mod: S$GLB,,, | Performed by: SOCIAL WORKER

## 2018-05-11 ENCOUNTER — PATIENT MESSAGE (OUTPATIENT)
Dept: PSYCHIATRY | Facility: CLINIC | Age: 28
End: 2018-05-11

## 2018-05-11 PROBLEM — F32.A DEPRESSION: Status: ACTIVE | Noted: 2018-05-11

## 2018-05-11 PROBLEM — F41.1 GAD (GENERALIZED ANXIETY DISORDER): Status: ACTIVE | Noted: 2018-05-11

## 2018-05-11 NOTE — PROGRESS NOTES
"Psychiatry Initial Visit (PhD/LCSW)  Diagnostic Interview - CPT 30149    Date: 5/10/2018    Site: [facility]    Referral source: Dr. Short    Clinical status of patient: Outpatient    Dora Hernandez, a 28 y.o. female, for initial evaluation visit.  Met with patient.    Chief complaint/reason for encounter: depression, anxiety, pmdd and past hx of trauma.     History of present illness: This is Dora JIMENEZ. A 28 year old AAF who is  (5yrs)  and has a 2 year old son. Pt reports she has struggled with depression and anxiety it seems to have worsened over the past year. Pt reports she has been missing work due to not being able to leave the house. Pt reports when she does go to work she spends a great deal of her time "hiding in her cubicle" crying. Pt reports she cries more days than not. 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 Pt reports she tries to organize her life at the present to do the bare minimum and does not look forward to anything except going to escape in her bed. Pt reports she has guilt over her son bc she is not fully present and does not enjoy engaging with him. Pt reports she is not very happy with most areas of her life. Pt reports she really wanted to go to dental school to be a dentist however never believed that she could do it. 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 Pt eventually went to college and has a BA in Public Health. Pt reports her parents are disappointed in her for not doing more. Pt reports her  works as a  and then works as a . Pt reports her  and his family are prominent Jazz musicians from [place]. Pt reports that her  is gone a lot for performances, writing music, and or recordings. Additionally, " finances are a stressor for her and this worries her. Pt reports her parents will not support her while she returns to school to become a hygienist. Pt reports she feels trapped in her life situation. Pt reports she has one older sister who is living in [facility]. Going to school to obtain her PhD in psychology. Pt reports she also had an incident 2.5 yrs ago where she was stuck in bed then got up went out for a drive eventually returned home to see her parents pt reports she has no recollection where she went or what she was involved in. Pt reports she has thoughts of suicidal ideations however she states she has not developed a plan. Pt reports she has not had any form of treatment prior to now. Pt notes that she is also being considered for PMDD. Pt reports she will be following up with her pcp in a month to go over blood work and discuss a trial of medication to address depression and anxiety. Pt notes significant trauma from her parents over her school work from her childhood.     Pain: noncontributory    Symptoms:   · Mood: depressed mood, diminished interest, hypersomnia, psychomotor agitation, fatigue, worthlessness/guilt, poor concentration, thoughts of death, tearfulness, social isolation and avoidance bx's.   · Anxiety: excessive anxiety/worry, restlessness/keyed up, irritability, muscle tension and post-traumatic stress  · Substance abuse: denied  · Cognitive functioning: denied  · Health behaviors: noncontributory    Psychiatric history: none    Medical history: PMDD     Family history of psychiatric illness: not known    Social history (marriage, employment, etc.): Pt reports to have been B/R in [place]. Pt reports she has been  for 5 yrs and has a 2 year old. Pt reports reports her  works in a warehouse and then plays and writes jazz music. Pt reports she likes to do crafts and read. Pt reports at times she can enjoy her son.     Substance use:   Alcohol: infrequent   Drugs: none    Tobacco: none   Caffeine: 1 ice coffee each day.   Current medications and drug reactions (include OTC, herbal): see medication list     Strengths and liabilities: Strength: Patient accepts guidance/feedback, Strength: Patient is expressive/articulate., Strength: Patient is intelligent., Strength: Patient is motivated for change., Strength: Patient is physically healthy., Strength: Patient has positive support network., Strength: Patient has reasonable judgment., Strength: Patient is stable.    Current Evaluation:     Mental Status Exam:  General Appearance:  age appropriate, casually dressed   Speech: normal tone, normal rate, normal pitch, normal volume      Level of Cooperation: cooperative      Thought Processes: normal and logical, circumstantial, perseverative   Mood: angry, dysthymic, sad      Thought Content: normal, no suicidality, no homicidality, delusions, or paranoia   Affect: congruent and appropriate   Orientation: Oriented x3   Memory: recent >  intact   Attention Span & Concentration: intact   Fund of General Knowledge: intact and appropriate to age and level of education   Abstract Reasoning: interpretation of similarities was abstract   Judgment & Insight: good     Language  intact     Diagnostic Impression - Plan:       Plan:individual psychotherapy    Return to Clinic in 2 weeks.     Length of Service (minutes): 60

## 2018-05-16 ENCOUNTER — TELEPHONE (OUTPATIENT)
Dept: ORTHOPEDICS | Facility: CLINIC | Age: 28
End: 2018-05-16

## 2018-05-16 NOTE — TELEPHONE ENCOUNTER
Spoke with pt.   States she has a large amount due for CT scan.  Pt requested that the CT for tomorrow be cancelled and she will call back to reschedule.   Cxl'd CT appointment

## 2018-05-28 ENCOUNTER — PATIENT MESSAGE (OUTPATIENT)
Dept: PSYCHIATRY | Facility: CLINIC | Age: 28
End: 2018-05-28

## 2019-01-11 ENCOUNTER — LAB VISIT (OUTPATIENT)
Dept: LAB | Facility: HOSPITAL | Age: 29
End: 2019-01-11
Attending: INTERNAL MEDICINE
Payer: COMMERCIAL

## 2019-01-11 ENCOUNTER — OFFICE VISIT (OUTPATIENT)
Dept: INTERNAL MEDICINE | Facility: CLINIC | Age: 29
End: 2019-01-11
Payer: COMMERCIAL

## 2019-01-11 VITALS
SYSTOLIC BLOOD PRESSURE: 96 MMHG | HEIGHT: 67 IN | TEMPERATURE: 98 F | HEART RATE: 96 BPM | OXYGEN SATURATION: 97 % | BODY MASS INDEX: 34.6 KG/M2 | DIASTOLIC BLOOD PRESSURE: 70 MMHG | RESPIRATION RATE: 18 BRPM | WEIGHT: 220.44 LBS

## 2019-01-11 DIAGNOSIS — Z13.89 SCREENING FOR BLOOD OR PROTEIN IN URINE: ICD-10-CM

## 2019-01-11 DIAGNOSIS — Z13.1 SCREENING FOR DIABETES MELLITUS: ICD-10-CM

## 2019-01-11 DIAGNOSIS — Z13.6 ENCOUNTER FOR LIPID SCREENING FOR CARDIOVASCULAR DISEASE: ICD-10-CM

## 2019-01-11 DIAGNOSIS — R20.2 TINGLING: ICD-10-CM

## 2019-01-11 DIAGNOSIS — Z13.220 ENCOUNTER FOR LIPID SCREENING FOR CARDIOVASCULAR DISEASE: ICD-10-CM

## 2019-01-11 DIAGNOSIS — F41.9 ANXIETY: ICD-10-CM

## 2019-01-11 DIAGNOSIS — F41.1 GAD (GENERALIZED ANXIETY DISORDER): Primary | ICD-10-CM

## 2019-01-11 DIAGNOSIS — F33.1 MODERATE EPISODE OF RECURRENT MAJOR DEPRESSIVE DISORDER: ICD-10-CM

## 2019-01-11 PROBLEM — R73.03 PREDIABETES: Status: ACTIVE | Noted: 2019-01-11

## 2019-01-11 LAB
ALBUMIN SERPL BCP-MCNC: 3.5 G/DL
ALP SERPL-CCNC: 83 U/L
ALT SERPL W/O P-5'-P-CCNC: 11 U/L
ANION GAP SERPL CALC-SCNC: 7 MMOL/L
AST SERPL-CCNC: 14 U/L
BASOPHILS # BLD AUTO: 0.06 K/UL
BASOPHILS NFR BLD: 1.4 %
BILIRUB SERPL-MCNC: 0.3 MG/DL
BUN SERPL-MCNC: 7 MG/DL
CALCIUM SERPL-MCNC: 9.5 MG/DL
CHLORIDE SERPL-SCNC: 107 MMOL/L
CHOLEST SERPL-MCNC: 201 MG/DL
CHOLEST/HDLC SERPL: 3 {RATIO}
CO2 SERPL-SCNC: 22 MMOL/L
CREAT SERPL-MCNC: 0.8 MG/DL
DIFFERENTIAL METHOD: ABNORMAL
EOSINOPHIL # BLD AUTO: 0.2 K/UL
EOSINOPHIL NFR BLD: 4.1 %
ERYTHROCYTE [DISTWIDTH] IN BLOOD BY AUTOMATED COUNT: 14.5 %
EST. GFR  (AFRICAN AMERICAN): >60 ML/MIN/1.73 M^2
EST. GFR  (NON AFRICAN AMERICAN): >60 ML/MIN/1.73 M^2
ESTIMATED AVG GLUCOSE: 117 MG/DL
GLUCOSE SERPL-MCNC: 87 MG/DL
HBA1C MFR BLD HPLC: 5.7 %
HCT VFR BLD AUTO: 44.9 %
HDLC SERPL-MCNC: 67 MG/DL
HDLC SERPL: 33.3 %
HGB BLD-MCNC: 13.6 G/DL
IMM GRANULOCYTES # BLD AUTO: 0.01 K/UL
IMM GRANULOCYTES NFR BLD AUTO: 0.2 %
LDLC SERPL CALC-MCNC: 122.8 MG/DL
LYMPHOCYTES # BLD AUTO: 1.8 K/UL
LYMPHOCYTES NFR BLD: 42 %
MCH RBC QN AUTO: 27.3 PG
MCHC RBC AUTO-ENTMCNC: 30.3 G/DL
MCV RBC AUTO: 90 FL
MONOCYTES # BLD AUTO: 0.2 K/UL
MONOCYTES NFR BLD: 5.8 %
NEUTROPHILS # BLD AUTO: 1.9 K/UL
NEUTROPHILS NFR BLD: 46.5 %
NONHDLC SERPL-MCNC: 134 MG/DL
NRBC BLD-RTO: 0 /100 WBC
PLATELET # BLD AUTO: 406 K/UL
PMV BLD AUTO: 9.5 FL
POTASSIUM SERPL-SCNC: 4.4 MMOL/L
PROT SERPL-MCNC: 8.3 G/DL
RBC # BLD AUTO: 4.99 M/UL
SODIUM SERPL-SCNC: 136 MMOL/L
TRIGL SERPL-MCNC: 56 MG/DL
TSH SERPL DL<=0.005 MIU/L-ACNC: 0.92 UIU/ML
VIT B12 SERPL-MCNC: 1007 PG/ML
WBC # BLD AUTO: 4.17 K/UL

## 2019-01-11 PROCEDURE — 84443 ASSAY THYROID STIM HORMONE: CPT

## 2019-01-11 PROCEDURE — 85025 COMPLETE CBC W/AUTO DIFF WBC: CPT

## 2019-01-11 PROCEDURE — 83036 HEMOGLOBIN GLYCOSYLATED A1C: CPT

## 2019-01-11 PROCEDURE — 99999 PR PBB SHADOW E&M-EST. PATIENT-LVL III: ICD-10-PCS | Mod: PBBFAC,,, | Performed by: INTERNAL MEDICINE

## 2019-01-11 PROCEDURE — 82607 VITAMIN B-12: CPT

## 2019-01-11 PROCEDURE — 99999 PR PBB SHADOW E&M-EST. PATIENT-LVL III: CPT | Mod: PBBFAC,,, | Performed by: INTERNAL MEDICINE

## 2019-01-11 PROCEDURE — 80061 LIPID PANEL: CPT

## 2019-01-11 PROCEDURE — 3008F BODY MASS INDEX DOCD: CPT | Mod: CPTII,S$GLB,, | Performed by: INTERNAL MEDICINE

## 2019-01-11 PROCEDURE — 36415 COLL VENOUS BLD VENIPUNCTURE: CPT | Mod: PO

## 2019-01-11 PROCEDURE — 99213 OFFICE O/P EST LOW 20 MIN: CPT | Mod: S$GLB,,, | Performed by: INTERNAL MEDICINE

## 2019-01-11 PROCEDURE — 3008F PR BODY MASS INDEX (BMI) DOCUMENTED: ICD-10-PCS | Mod: CPTII,S$GLB,, | Performed by: INTERNAL MEDICINE

## 2019-01-11 PROCEDURE — 99213 PR OFFICE/OUTPT VISIT, EST, LEVL III, 20-29 MIN: ICD-10-PCS | Mod: S$GLB,,, | Performed by: INTERNAL MEDICINE

## 2019-01-11 PROCEDURE — 80053 COMPREHEN METABOLIC PANEL: CPT

## 2019-01-11 RX ORDER — ESCITALOPRAM OXALATE 10 MG/1
10 TABLET ORAL DAILY
Qty: 30 TABLET | Refills: 0 | Status: SHIPPED | OUTPATIENT
Start: 2019-01-11 | End: 2019-01-22 | Stop reason: ALTCHOICE

## 2019-01-11 NOTE — PROGRESS NOTES
Subjective:       Patient ID: Dora Hernandez is a 29 y.o. female who presents for Anxiety and Depression      Anxiety   Presents for initial visit. Onset was at an unknown time. The problem has been gradually worsening. Symptoms include depressed mood, excessive worry, irritability, nervous/anxious behavior and restlessness. Patient reports no chest pain, dizziness, nausea, palpitations, panic or shortness of breath. Symptoms occur constantly. The severity of symptoms is severe. The symptoms are aggravated by work stress. Nighttime awakenings: occasional.     There are no known risk factors. There is no history of anemia, asthma, bipolar disorder or chronic lung disease. Past treatments include counseling (CBT). The treatment provided mild relief. Compliance with prior treatments has been good.   Depression   Visit Type: initial  Onset of symptoms: at an unknown time  Progression since onset: gradually worsening  Patient presents with the following symptoms: anhedonia, depressed mood, excessive worry, fatigue, feelings of worthlessness, irritability, nervousness/anxiety and restlessness.  Patient is not experiencing: hypersomnia, nausea, palpitations, panic, shortness of breath, thoughts of death, weight gain and weight loss.  Frequency of symptoms: constantly   Severity: severe   Aggravated by: work stress  Sleep quality: fair  Risk factors: no known risk factors  No history of: anemia, asthma and chronic lung disease  Treatment tried: counseling (CBT)  Improvement on treatment: mild      Patient considering use of medications to treat anxiety and depression. She is currently in therapy and reports improvement. Discussed that there is some risk of adverse risk if she decides to have a baby.      Review of Systems   Constitutional: Positive for irritability. Negative for chills, diaphoresis, fatigue, fever, weight gain and weight loss.   HENT: Negative for congestion and rhinorrhea.    Eyes: Negative for  "discharge, redness and itching.   Respiratory: Negative for cough and shortness of breath.    Cardiovascular: Negative for chest pain, palpitations and leg swelling.   Gastrointestinal: Negative for abdominal pain, constipation, diarrhea, nausea and vomiting.   Endocrine: Negative for cold intolerance, heat intolerance, polydipsia and polyuria.   Genitourinary: Positive for menstrual problem (reports MSDD). Negative for dysuria, hematuria and urgency.        + breast pain bilaterally, reports that she has fibrocystic breast disease   Musculoskeletal: Positive for arthralgias (left ankle pain, intermittent) and back pain (comes and goes). Negative for myalgias.        Reports left anterior thigh "heaviness" and 4/10 in severity, no swelling, no numbness, does not radiate.   Skin: Negative for rash.   Neurological: Positive for headaches (frequent migraines, ). Negative for dizziness, weakness and numbness.   Psychiatric/Behavioral: Positive for depression and dysphoric mood. The patient is nervous/anxious.        Objective:      Physical Exam   Constitutional: She is oriented to person, place, and time. Vital signs are normal. She appears well-developed and well-nourished. No distress.   HENT:   Head: Normocephalic and atraumatic.   Right Ear: Hearing and external ear normal.   Left Ear: Hearing and external ear normal.   Nose: Nose normal.   Mouth/Throat: Uvula is midline.   Eyes: Lids are normal.   Cardiovascular: Normal rate, regular rhythm, normal heart sounds and intact distal pulses.   No murmur heard.  Pulmonary/Chest: Effort normal and breath sounds normal. She has no wheezes.   Abdominal: Soft. Bowel sounds are normal. She exhibits no distension. There is no tenderness.   Musculoskeletal: Normal range of motion. She exhibits no edema.   Neurological: She is alert and oriented to person, place, and time.   Skin: Skin is warm, dry and intact. No rash noted. She is not diaphoretic.   Psychiatric: She has a " normal mood and affect.   Vitals reviewed.      Assessment/Plan:         1. DOUGLAS (generalized anxiety disorder)  - CBC auto differential; Future  - TSH; Future  - escitalopram oxalate (LEXAPRO) 10 MG tablet; Take 1 tablet (10 mg total) by mouth once daily.  Dispense: 30 tablet; Refill: 0    2. Moderate episode of recurrent major depressive disorder  - trial of Lexapro 10mg daily  - continue sessions with therapist    3. Tingling  - Comprehensive metabolic panel; Future  - Vitamin B12; Future    4. Encounter for lipid screening for cardiovascular disease  - Lipid panel; Future    5. Screening for diabetes mellitus  - Hemoglobin A1c; Future    6. Screening for blood or protein in urine  - Urinalysis; Future    RTC in 2 months for annual exam or sooner if needed    Janna Centeno MD

## 2019-01-15 ENCOUNTER — TELEPHONE (OUTPATIENT)
Dept: INTERNAL MEDICINE | Facility: CLINIC | Age: 29
End: 2019-01-15

## 2019-01-15 DIAGNOSIS — Z00.00 ANNUAL PHYSICAL EXAM: Primary | ICD-10-CM

## 2019-01-15 DIAGNOSIS — R79.89 ABNORMAL CBC: ICD-10-CM

## 2019-01-15 NOTE — TELEPHONE ENCOUNTER
Annual physical already scheduled for 3/14    Please enter orders for routine labs to be drawn and alert staff so they can be linked to appt. Thank you     Pt informed via portal and letter sent to residence.

## 2019-01-22 ENCOUNTER — PATIENT MESSAGE (OUTPATIENT)
Dept: INTERNAL MEDICINE | Facility: CLINIC | Age: 29
End: 2019-01-22

## 2019-01-22 RX ORDER — SERTRALINE HYDROCHLORIDE 25 MG/1
25 TABLET, FILM COATED ORAL DAILY
Qty: 30 TABLET | Refills: 0 | Status: SHIPPED | OUTPATIENT
Start: 2019-01-22 | End: 2019-03-01

## 2019-03-01 ENCOUNTER — OFFICE VISIT (OUTPATIENT)
Dept: OBSTETRICS AND GYNECOLOGY | Facility: CLINIC | Age: 29
End: 2019-03-01
Payer: COMMERCIAL

## 2019-03-01 ENCOUNTER — LAB VISIT (OUTPATIENT)
Dept: LAB | Facility: OTHER | Age: 29
End: 2019-03-01
Attending: OBSTETRICS & GYNECOLOGY
Payer: COMMERCIAL

## 2019-03-01 VITALS
SYSTOLIC BLOOD PRESSURE: 110 MMHG | HEIGHT: 67 IN | WEIGHT: 224 LBS | BODY MASS INDEX: 35.16 KG/M2 | DIASTOLIC BLOOD PRESSURE: 80 MMHG

## 2019-03-01 DIAGNOSIS — Z31.69 ENCOUNTER FOR PRECONCEPTION CONSULTATION: ICD-10-CM

## 2019-03-01 DIAGNOSIS — F32.81 PREMENSTRUAL DYSPHORIC DISORDER: ICD-10-CM

## 2019-03-01 DIAGNOSIS — Z01.419 ENCOUNTER FOR GYNECOLOGICAL EXAMINATION WITHOUT ABNORMAL FINDING: ICD-10-CM

## 2019-03-01 DIAGNOSIS — F41.1 GAD (GENERALIZED ANXIETY DISORDER): Primary | ICD-10-CM

## 2019-03-01 DIAGNOSIS — F33.1 MODERATE EPISODE OF RECURRENT MAJOR DEPRESSIVE DISORDER: ICD-10-CM

## 2019-03-01 LAB
BASOPHILS # BLD AUTO: 0.04 K/UL
BASOPHILS NFR BLD: 0.8 %
DIFFERENTIAL METHOD: ABNORMAL
EOSINOPHIL # BLD AUTO: 0.3 K/UL
EOSINOPHIL NFR BLD: 5.2 %
ERYTHROCYTE [DISTWIDTH] IN BLOOD BY AUTOMATED COUNT: 14.4 %
HAV IGM SERPL QL IA: NEGATIVE
HBV CORE IGM SERPL QL IA: NEGATIVE
HBV SURFACE AG SERPL QL IA: NEGATIVE
HCT VFR BLD AUTO: 41.7 %
HCV AB SERPL QL IA: NEGATIVE
HGB BLD-MCNC: 13.3 G/DL
HIV 1+2 AB+HIV1 P24 AG SERPL QL IA: NEGATIVE
LYMPHOCYTES # BLD AUTO: 1.4 K/UL
LYMPHOCYTES NFR BLD: 29 %
MCH RBC QN AUTO: 27.3 PG
MCHC RBC AUTO-ENTMCNC: 31.9 G/DL
MCV RBC AUTO: 86 FL
MONOCYTES # BLD AUTO: 0.3 K/UL
MONOCYTES NFR BLD: 6 %
NEUTROPHILS # BLD AUTO: 2.8 K/UL
NEUTROPHILS NFR BLD: 58.6 %
PLATELET # BLD AUTO: 370 K/UL
PMV BLD AUTO: 9.5 FL
RBC # BLD AUTO: 4.88 M/UL
T4 FREE SERPL-MCNC: 1.2 NG/DL
TSH SERPL DL<=0.005 MIU/L-ACNC: 1.02 UIU/ML
WBC # BLD AUTO: 4.82 K/UL

## 2019-03-01 PROCEDURE — 86592 SYPHILIS TEST NON-TREP QUAL: CPT

## 2019-03-01 PROCEDURE — 80074 ACUTE HEPATITIS PANEL: CPT

## 2019-03-01 PROCEDURE — 99999 PR PBB SHADOW E&M-EST. PATIENT-LVL II: CPT | Mod: PBBFAC,,, | Performed by: OBSTETRICS & GYNECOLOGY

## 2019-03-01 PROCEDURE — 86762 RUBELLA ANTIBODY: CPT

## 2019-03-01 PROCEDURE — 99999 PR PBB SHADOW E&M-EST. PATIENT-LVL II: ICD-10-PCS | Mod: PBBFAC,,, | Performed by: OBSTETRICS & GYNECOLOGY

## 2019-03-01 PROCEDURE — 84439 ASSAY OF FREE THYROXINE: CPT

## 2019-03-01 PROCEDURE — 86703 HIV-1/HIV-2 1 RESULT ANTBDY: CPT

## 2019-03-01 PROCEDURE — 86787 VARICELLA-ZOSTER ANTIBODY: CPT

## 2019-03-01 PROCEDURE — 99395 PR PREVENTIVE VISIT,EST,18-39: ICD-10-PCS | Mod: S$GLB,,, | Performed by: OBSTETRICS & GYNECOLOGY

## 2019-03-01 PROCEDURE — 99395 PREV VISIT EST AGE 18-39: CPT | Mod: S$GLB,,, | Performed by: OBSTETRICS & GYNECOLOGY

## 2019-03-01 PROCEDURE — 83020 HEMOGLOBIN ELECTROPHORESIS: CPT

## 2019-03-01 PROCEDURE — 85025 COMPLETE CBC W/AUTO DIFF WBC: CPT

## 2019-03-01 PROCEDURE — 84443 ASSAY THYROID STIM HORMONE: CPT

## 2019-03-01 PROCEDURE — 36415 COLL VENOUS BLD VENIPUNCTURE: CPT

## 2019-03-01 NOTE — PROGRESS NOTES
HISTORY OF PRESENT ILLNESS:    Dora Hernandez is a 29 y.o. female, , Patient's last menstrual period was 2019.,  presents for a routine exam and has no complaints.Cycles once a month since delivery. No birth control.   Patient considering pregnancy soon.    - Arnold - 32yo - PMH - No, Tob - no, Camera man, PSH- no, mumps - no, one son    Stopped Zoloft secondary to side effects, seeing Moo..   She does report that her mood is better and she is handling things were better.    No suicidal ideations or homicidal ideation    Past Medical History:   Diagnosis Date    Abnormal Pap smear     Migraine headache     Polycystic disease, ovaries        Past Surgical History:   Procedure Laterality Date    TYMPANOSTOMY         MEDICATIONS AND ALLERGIES:    No current outpatient medications on file.    Review of patient's allergies indicates:   Allergen Reactions    Metformin Nausea And Vomiting       Family History   Problem Relation Age of Onset    Hypertension Father     Stroke Father     Thyroid disease Mother     Lung cancer Maternal Grandmother        Social History     Socioeconomic History    Marital status:      Spouse name: Not on file    Number of children: Not on file    Years of education: Not on file    Highest education level: Not on file   Social Needs    Financial resource strain: Not on file    Food insecurity - worry: Not on file    Food insecurity - inability: Not on file    Transportation needs - medical: Not on file    Transportation needs - non-medical: Not on file   Occupational History    Not on file   Tobacco Use    Smoking status: Never Smoker    Smokeless tobacco: Never Used   Substance and Sexual Activity    Alcohol use: No    Drug use: No    Sexual activity: Yes     Partners: Male     Birth control/protection: None   Other Topics Concern    Not on file   Social History Narrative    Not on file       COMPREHENSIVE GYN HISTORY:  PAP  "History: Denies abnormal Paps.  Infection History: Denies STDs. Denies PID.  Benign History: Denies uterine fibroids. Denies ovarian cysts. Denies endometriosis. Denies other conditions.  Cancer History: Denies cervical cancer. Denies uterine cancer or hyperplasia. Denies ovarian cancer. Denies vulvar cancer or pre-cancer. Denies vaginal cancer or pre-cancer. Denies breast cancer. Denies colon cancer.  Sexual Activity History: Reports currently being sexually active  Menstrual History: Monthly. Mod then light flow.   Dysmenorrhea History: Reports mild dysmenorrhea.       ROS:  GENERAL: No weight changes. No swelling. No fatigue. No fever.  CARDIOVASCULAR: No chest pain. No shortness of breath. No leg cramps.   NEUROLOGICAL: No headaches. No vision changes.  BREASTS: No pain. No lumps. No discharge.  ABDOMEN: No pain. No nausea. No vomiting. No diarrhea. No constipation.  REPRODUCTIVE: No abnormal bleeding.   VULVA: No pain. No lesions. No itching.  VAGINA: No relaxation. No itching. No odor. No discharge. No lesions.  URINARY: No incontinence. No nocturia. No frequency. No dysuria.    /80   Ht 5' 7" (1.702 m)   Wt 101.6 kg (223 lb 15.8 oz)   LMP 01/30/2019   BMI 35.08 kg/m²     PE:  APPEARANCE: Well nourished, well developed, in no acute distress.  AFFECT: WNL, alert and oriented x 3.  SKIN: No acne or hirsutism.  NECK: Neck symmetric, without masses or thyromegaly.  NODES: No inguinal, cervical, axillary or femoral lymph node enlargement.  CHEST: Good respiratory effort.   ABDOMEN: Soft. No tenderness or masses. No hepatosplenomegaly. No hernias.  BREASTS: Symmetrical, no skin changes, visible lesions, palpable masses or nipple discharge bilaterally.  PELVIC: External female genitalia without lesions.  Female hair distribution. Adequate perineal body, Normal urethral meatus. Vagina moist and well rugated without lesions or discharge.  No significant cystocele or rectocele present. Cervix pink without " lesions, discharge or tenderness. Uterus is 4-6 week size, regular, mobile and nontender. Adnexa without masses or tenderness.  EXTREMITIES: No edema    DIAGNOSIS:  1. DOUGLAS (generalized anxiety disorder)    2. Moderate episode of recurrent major depressive disorder    3. Premenstrual dysphoric disorder    4. Encounter for gynecological examination without abnormal finding    5. Encounter for preconception consultation        PLAN:    Orders Placed This Encounter    Hepatitis panel, acute    CBC auto differential    Hemoglobin Electrophoresis,Hgb A2 Pablo.    HIV 1/2 Ag/Ab (4th Gen)    RPR    Rubella antibody, IgG    T4, free    TSH    Varicella zoster antibody, IgG       COUNSELING:  The patient was counseled today on:  -A.C.S. Pap and pelvic exam guidelines (pap every 3 years), recomendations for yearly mammogram;  -to follow up with her PCP for other health maintenance.    FOLLOW-UP with me annually.   Daily MVI   Patient is aware to tell other providers that she is attempting pregnancy

## 2019-03-02 LAB
VARICELLA INTERPRETATION: POSITIVE
VARICELLA ZOSTER IGG: 1.87 ISR

## 2019-03-04 LAB
HGB A2 MFR BLD HPLC: 2.7 %
HGB FRACT BLD ELPH-IMP: NORMAL
HGB FRACT BLD ELPH-IMP: NORMAL
RPR SER QL: NORMAL
RUBV IGG SER-ACNC: 71.8 IU/ML
RUBV IGG SER-IMP: REACTIVE

## 2019-04-01 ENCOUNTER — OFFICE VISIT (OUTPATIENT)
Dept: OBSTETRICS AND GYNECOLOGY | Facility: CLINIC | Age: 29
End: 2019-04-01
Payer: COMMERCIAL

## 2019-04-01 VITALS
SYSTOLIC BLOOD PRESSURE: 110 MMHG | DIASTOLIC BLOOD PRESSURE: 70 MMHG | HEIGHT: 67 IN | WEIGHT: 225 LBS | BODY MASS INDEX: 35.31 KG/M2

## 2019-04-01 DIAGNOSIS — F32.81 PREMENSTRUAL DYSPHORIC DISORDER: ICD-10-CM

## 2019-04-01 DIAGNOSIS — Z01.419 ENCOUNTER FOR GYNECOLOGICAL EXAMINATION WITHOUT ABNORMAL FINDING: Primary | ICD-10-CM

## 2019-04-01 DIAGNOSIS — L68.0 HIRSUTISM: ICD-10-CM

## 2019-04-01 DIAGNOSIS — N76.0 ACUTE VAGINITIS: ICD-10-CM

## 2019-04-01 DIAGNOSIS — F41.1 GAD (GENERALIZED ANXIETY DISORDER): ICD-10-CM

## 2019-04-01 PROCEDURE — 88175 CYTOPATH C/V AUTO FLUID REDO: CPT

## 2019-04-01 PROCEDURE — 99395 PR PREVENTIVE VISIT,EST,18-39: ICD-10-PCS | Mod: S$GLB,,, | Performed by: OBSTETRICS & GYNECOLOGY

## 2019-04-01 PROCEDURE — 3008F BODY MASS INDEX DOCD: CPT | Mod: CPTII,S$GLB,, | Performed by: OBSTETRICS & GYNECOLOGY

## 2019-04-01 PROCEDURE — 87480 CANDIDA DNA DIR PROBE: CPT

## 2019-04-01 PROCEDURE — 99395 PREV VISIT EST AGE 18-39: CPT | Mod: S$GLB,,, | Performed by: OBSTETRICS & GYNECOLOGY

## 2019-04-01 PROCEDURE — 3008F PR BODY MASS INDEX (BMI) DOCUMENTED: ICD-10-PCS | Mod: CPTII,S$GLB,, | Performed by: OBSTETRICS & GYNECOLOGY

## 2019-04-01 PROCEDURE — 99999 PR PBB SHADOW E&M-EST. PATIENT-LVL III: ICD-10-PCS | Mod: PBBFAC,,, | Performed by: OBSTETRICS & GYNECOLOGY

## 2019-04-01 PROCEDURE — 87510 GARDNER VAG DNA DIR PROBE: CPT

## 2019-04-01 PROCEDURE — 99999 PR PBB SHADOW E&M-EST. PATIENT-LVL III: CPT | Mod: PBBFAC,,, | Performed by: OBSTETRICS & GYNECOLOGY

## 2019-04-01 NOTE — PROGRESS NOTES
HISTORY OF PRESENT ILLNESS:    Dora Hernandez is a 29 y.o. female, , Patient's last menstrual period was 2019.,  presents for follow up for pap and vaginal discharge.   Preconception labs WNL  Patient also reports worsening of hirsutism.     Past Medical History:   Diagnosis Date    Abnormal Pap smear     Migraine headache     Polycystic disease, ovaries        Past Surgical History:   Procedure Laterality Date    TYMPANOSTOMY         MEDICATIONS AND ALLERGIES:    No current outpatient medications on file.    Review of patient's allergies indicates:   Allergen Reactions    Metformin Nausea And Vomiting       Family History   Problem Relation Age of Onset    Hypertension Father     Stroke Father     Thyroid disease Mother     Lung cancer Maternal Grandmother        Social History     Socioeconomic History    Marital status:      Spouse name: Not on file    Number of children: Not on file    Years of education: Not on file    Highest education level: Not on file   Occupational History    Not on file   Social Needs    Financial resource strain: Not on file    Food insecurity:     Worry: Not on file     Inability: Not on file    Transportation needs:     Medical: Not on file     Non-medical: Not on file   Tobacco Use    Smoking status: Never Smoker    Smokeless tobacco: Never Used   Substance and Sexual Activity    Alcohol use: No    Drug use: No    Sexual activity: Yes     Partners: Male     Birth control/protection: None   Lifestyle    Physical activity:     Days per week: Not on file     Minutes per session: Not on file    Stress: Not on file   Relationships    Social connections:     Talks on phone: Not on file     Gets together: Not on file     Attends [employer] service: Not on file     Active member of club or organization: Not on file     Attends meetings of clubs or organizations: Not on file     Relationship status: Not on file    Intimate partner  "violence:     Fear of current or ex partner: Not on file     Emotionally abused: Not on file     Physically abused: Not on file     Forced sexual activity: Not on file   Other Topics Concern    Not on file   Social History Narrative    Not on file       COMPREHENSIVE GYN HISTORY:  PAP History: Denies abnormal Paps.  Infection History: Denies STDs. Denies PID.  Benign History: Denies uterine fibroids. Denies ovarian cysts. Denies endometriosis. Denies other conditions.  Cancer History: Denies cervical cancer. Denies uterine cancer or hyperplasia. Denies ovarian cancer. Denies vulvar cancer or pre-cancer. Denies vaginal cancer or pre-cancer. Denies breast cancer. Denies colon cancer.  Sexual Activity History: Reports currently being sexually active  Menstrual History: Monthly. Mod then light flow.   Dysmenorrhea History: Reports mild dysmenorrhea.       ROS:  GENERAL: No weight changes. No swelling. No fatigue. No fever.  CARDIOVASCULAR: No chest pain. No shortness of breath. No leg cramps.   NEUROLOGICAL: No headaches. No vision changes.  BREASTS: No pain. No lumps. No discharge.  ABDOMEN: No pain. No nausea. No vomiting. No diarrhea. No constipation.  REPRODUCTIVE: No abnormal bleeding.   VULVA: No pain. No lesions. No itching.  VAGINA: No relaxation. No itching. No odor. No discharge. No lesions.  URINARY: No incontinence. No nocturia. No frequency. No dysuria.    /70   Ht 5' 7" (1.702 m)   Wt 102.1 kg (225 lb)   LMP 03/09/2019   BMI 35.24 kg/m²     PE:  APPEARANCE: Well nourished, well developed, in no acute distress.  AFFECT: WNL, alert and oriented x 3.  SKIN: No acne or hirsutism.  NECK: Neck symmetric, without masses or thyromegaly.  NODES: No inguinal, cervical, axillary or femoral lymph node enlargement.  CHEST: Good respiratory effort.   ABDOMEN: Soft. No tenderness or masses. No hepatosplenomegaly. No hernias.  BREASTS: Symmetrical, no skin changes, visible lesions, palpable masses or nipple " discharge bilaterally.  PELVIC: External female genitalia without lesions.  Female hair distribution. Adequate perineal body, Normal urethral meatus. Vagina moist and well rugated without lesions or discharge.  No significant cystocele or rectocele present. Cervix pink without lesions, discharge or tenderness. Uterus is 4-6 week size, regular, mobile and nontender. Adnexa without masses or tenderness.  EXTREMITIES: No edema    DIAGNOSIS:  1. Encounter for gynecological examination without abnormal finding    2. Acute vaginitis    3. DOUGLAS (generalized anxiety disorder)    4. Premenstrual dysphoric disorder        COUNSELING:  The patient was counseled today on:  -A.C.S. Pap and pelvic exam guidelines (pap every 3 years), recomendations for yearly mammogram;  -to follow up with her PCP for other health maintenance.    FOLLOW-UP with me in 6 months

## 2019-04-02 ENCOUNTER — TELEPHONE (OUTPATIENT)
Dept: OBSTETRICS AND GYNECOLOGY | Facility: CLINIC | Age: 29
End: 2019-04-02

## 2019-04-02 DIAGNOSIS — N76.0 ACUTE VAGINITIS: Primary | ICD-10-CM

## 2019-04-02 LAB
BACTERIAL VAGINOSIS DNA: ABNORMAL
CANDIDA GLABRATA DNA: NEGATIVE
CANDIDA KRUSEI DNA: NEGATIVE
CANDIDA RRNA VAG QL PROBE: POSITIVE
T VAGINALIS RRNA GENITAL QL PROBE: NEGATIVE

## 2019-04-02 RX ORDER — TERCONAZOLE 4 MG/G
1 CREAM VAGINAL NIGHTLY
Qty: 1 G | Refills: 0 | Status: SHIPPED | OUTPATIENT
Start: 2019-04-02 | End: 2019-04-09

## 2019-04-02 NOTE — TELEPHONE ENCOUNTER
I spoke to the pt and gave her the results of her cultures per Dr Short and pt was informed that she has medicine at the pharmacy for .

## 2019-04-02 NOTE — TELEPHONE ENCOUNTER
Patient has Yeast vaginitis.   Plan:  Terazol 7 Disp one tube, one applicatorful qhs for 7 nights sent to the pharmacy.    Please  patient on vaginitis prevention including :  a. avoiding feminine products such as deoderant soaps, body wash, bubble bath, douches, scented toilet paper, deoderant tampons or pads, feminine wipes, chronic pad use, etc.  b. avoiding other vulvovaginal irritants such as long hot baths, humidity, tight, synthetic clothing, chlorine and sitting around in wet bathing suits  c. wearing cotton underwear, avoiding thong underwear and no underwear to bed  d. taking showers instead of baths and use a hair dryer on cool setting afterwards to dry  e. wearing cotton to exercise and shower immediately after exercise and change clothes  f. using polyurethane condoms without spermicide if sexually active and symptoms are triggered by intercourse

## 2019-04-16 ENCOUNTER — OFFICE VISIT (OUTPATIENT)
Dept: URGENT CARE | Facility: CLINIC | Age: 29
End: 2019-04-16
Payer: COMMERCIAL

## 2019-04-16 VITALS
HEART RATE: 89 BPM | BODY MASS INDEX: 35.31 KG/M2 | SYSTOLIC BLOOD PRESSURE: 116 MMHG | OXYGEN SATURATION: 99 % | RESPIRATION RATE: 18 BRPM | HEIGHT: 67 IN | TEMPERATURE: 99 F | DIASTOLIC BLOOD PRESSURE: 82 MMHG | WEIGHT: 225 LBS

## 2019-04-16 DIAGNOSIS — M54.9 OTHER ACUTE BACK PAIN: ICD-10-CM

## 2019-04-16 DIAGNOSIS — N30.01 ACUTE CYSTITIS WITH HEMATURIA: ICD-10-CM

## 2019-04-16 DIAGNOSIS — M54.41 ACUTE RIGHT-SIDED LOW BACK PAIN WITH RIGHT-SIDED SCIATICA: Primary | ICD-10-CM

## 2019-04-16 LAB
B-HCG UR QL: NEGATIVE
BILIRUB UR QL STRIP: NEGATIVE
CTP QC/QA: YES
GLUCOSE UR QL STRIP: NEGATIVE
KETONES UR QL STRIP: NEGATIVE
LEUKOCYTE ESTERASE UR QL STRIP: POSITIVE
PH, POC UA: 6
POC BLOOD, URINE: POSITIVE
POC NITRATES, URINE: NEGATIVE
PROT UR QL STRIP: NEGATIVE
SP GR UR STRIP: 1.02 (ref 1–1.03)
UROBILINOGEN UR STRIP-ACNC: NORMAL (ref 0.1–1.1)

## 2019-04-16 PROCEDURE — 81025 POCT URINE PREGNANCY: ICD-10-PCS | Mod: S$GLB,,, | Performed by: EMERGENCY MEDICINE

## 2019-04-16 PROCEDURE — 81003 POCT URINALYSIS, DIPSTICK, AUTOMATED, W/O SCOPE: ICD-10-PCS | Mod: QW,S$GLB,, | Performed by: EMERGENCY MEDICINE

## 2019-04-16 PROCEDURE — 99214 PR OFFICE/OUTPT VISIT, EST, LEVL IV, 30-39 MIN: ICD-10-PCS | Mod: 25,S$GLB,, | Performed by: EMERGENCY MEDICINE

## 2019-04-16 PROCEDURE — 3008F BODY MASS INDEX DOCD: CPT | Mod: CPTII,S$GLB,, | Performed by: EMERGENCY MEDICINE

## 2019-04-16 PROCEDURE — 81003 URINALYSIS AUTO W/O SCOPE: CPT | Mod: QW,S$GLB,, | Performed by: EMERGENCY MEDICINE

## 2019-04-16 PROCEDURE — 3008F PR BODY MASS INDEX (BMI) DOCUMENTED: ICD-10-PCS | Mod: CPTII,S$GLB,, | Performed by: EMERGENCY MEDICINE

## 2019-04-16 PROCEDURE — 99214 OFFICE O/P EST MOD 30 MIN: CPT | Mod: 25,S$GLB,, | Performed by: EMERGENCY MEDICINE

## 2019-04-16 PROCEDURE — 81025 URINE PREGNANCY TEST: CPT | Mod: S$GLB,,, | Performed by: EMERGENCY MEDICINE

## 2019-04-16 RX ORDER — NITROFURANTOIN 25; 75 MG/1; MG/1
100 CAPSULE ORAL 2 TIMES DAILY
Qty: 14 CAPSULE | Refills: 0 | Status: SHIPPED | OUTPATIENT
Start: 2019-04-16 | End: 2019-04-23

## 2019-04-16 RX ORDER — METHOCARBAMOL 500 MG/1
1000 TABLET, FILM COATED ORAL 4 TIMES DAILY
Qty: 56 TABLET | Refills: 0 | Status: SHIPPED | OUTPATIENT
Start: 2019-04-16 | End: 2019-04-23

## 2019-04-16 RX ORDER — MELOXICAM 15 MG/1
15 TABLET ORAL DAILY
Qty: 7 TABLET | Refills: 0 | Status: SHIPPED | OUTPATIENT
Start: 2019-04-16 | End: 2019-04-23

## 2019-04-16 NOTE — PROGRESS NOTES
"Subjective:       Patient ID: Dora Hernandez is a 29 y.o. female.    Vitals:    04/16/19 1849   BP: 116/82   Pulse: 89   Resp: 18   Temp: 98.7 °F (37.1 °C)   SpO2: 99%   Weight: 102.1 kg (225 lb)   Height: 5' 7" (1.702 m)       Chief Complaint: Back Pain (4 days now )    Back Pain   This is a new problem. The current episode started in the past 7 days. The problem occurs constantly. The problem has been gradually worsening since onset. The quality of the pain is described as aching. The pain does not radiate. The pain is at a severity of 5/10. The pain is worse during the night (lying down ). The symptoms are aggravated by standing. Pertinent negatives include no abdominal pain, bladder incontinence, bowel incontinence, dysuria or numbness. Risk factors include history of cancer. She has tried NSAIDs and analgesics for the symptoms. The treatment provided no relief.     Review of Systems   Constitution: Negative for malaise/fatigue.   Skin: Negative for rash.   Musculoskeletal: Positive for back pain. Negative for muscle cramps, muscle weakness and stiffness.   Gastrointestinal: Negative for abdominal pain and bowel incontinence.   Genitourinary: Positive for frequency and urgency. Negative for bladder incontinence, dysuria and hematuria.   Neurological: Negative for disturbances in coordination and numbness.       Objective:      Physical Exam   Constitutional: She is oriented to person, place, and time. Vital signs are normal. She appears well-developed and well-nourished. She is active and cooperative. No distress.   HENT:   Head: Normocephalic and atraumatic.   Nose: Nose normal.   Mouth/Throat: Oropharynx is clear and moist and mucous membranes are normal.   Eyes: Conjunctivae and lids are normal.   Neck: Trachea normal, normal range of motion, full passive range of motion without pain and phonation normal. Neck supple.   Cardiovascular: Normal rate, regular rhythm, normal heart sounds, intact distal " pulses and normal pulses.   Pulmonary/Chest: Effort normal and breath sounds normal.   Abdominal: Soft. Normal appearance and bowel sounds are normal. She exhibits no abdominal bruit, no pulsatile midline mass and no mass.   Musculoskeletal: She exhibits no edema or deformity.        Lumbar back: She exhibits decreased range of motion, tenderness, pain and spasm. She exhibits no bony tenderness, no swelling, no edema, no deformity, no laceration and normal pulse.        Back:    Neurological: She is alert and oriented to person, place, and time. She has normal strength and normal reflexes. No sensory deficit.   Skin: Skin is warm, dry and intact. She is not diaphoretic.   Psychiatric: She has a normal mood and affect. Her speech is normal and behavior is normal. Judgment and thought content normal. Cognition and memory are normal.   Nursing note and vitals reviewed.      Assessment:       1. Acute right-sided low back pain with right-sided sciatica    2. Other acute back pain    3. Acute cystitis with hematuria        Plan:       Dora was seen today for back pain.    Diagnoses and all orders for this visit:    Acute right-sided low back pain with right-sided sciatica    Other acute back pain  -     POCT Urinalysis, Dipstick, Automated, W/O Scope  -     Cancel: hCG, quantitative  -     POCT urine pregnancy    Acute cystitis with hematuria    Other orders  -     nitrofurantoin, macrocrystal-monohydrate, (MACROBID) 100 MG capsule; Take 1 capsule (100 mg total) by mouth 2 (two) times daily. for 7 days  -     methocarbamol (ROBAXIN) 500 MG Tab; Take 2 tablets (1,000 mg total) by mouth 4 (four) times daily. for 7 days  -     meloxicam (MOBIC) 15 MG tablet; Take 1 tablet (15 mg total) by mouth once daily. for 7 days          Patient Instructions     Go to the Emergency Room if symptoms or condition worsens in any way    Follow up with Primary Care Provider or Back Specialist in 5-7 days if not improved    General Neck  and Back Pain    Both neck and back pain are usually caused by injury to the muscles or ligaments of the spine. Sometimes the disks that separate each bone of the spine may cause pain by pressing on a nearby nerve. Back and neck pain may appear after a sudden twisting or bending force (such as in a car accident), or sometimes after a simple awkward movement. In either case, muscle spasm is often present and adds to the pain.  Acute neck and back pain usually gets better in 1 to 2 weeks. Pain related to disk disease, arthritis in the spinal joints or spinal stenosis (narrowing of the spinal canal) can become chronic and last for months or years.  Back and neck pain are common problems. Most people feel better in 1 or 2 weeks, and most of the rest in 1 to 2 months. Most people can remain active.  People experience and describe pain differently.  · Pain can be sharp, stabbing, shooting, aching, cramping, or burning  · Movement, standing, bending, lifting, sitting, or walking may worsen the pain  · Pain can be localized to one spot or area, or it can be more generalized  · Pain can spread or radiate upwards, downwards, to the front, or go down your arms  · Muscle spasm may occur.  Most of the time mechanical problems with the muscles or spine cause the pain. it is usually caused by an injury, whether known or not, to the muscles or ligaments. While illnesses can cause back pain, it is usually not caused by a serious illness. Pain is usually related to physical activity, whether sports, exercise, work, or normal activity. Sometimes it can occur without an identifiable cause. This can happen simply by stretching or moving wrong, without noting pain at the time. Other causes include:  · Overexertion, lifting, pushing, pulling incorrectly or too aggressively.  · Sudden twisting, bending or stretching from an accident (car or fall), or accidental movement.  · Poor posture  · Poor conditioning, lack of regular  exercise  · Spinal disc disease or arthritis  · Stress  · Pregnancy, or illness like appendicitis, bladder or kidney infection, pelvic infections   Home care  · For neck pain: Use a comfortable pillow that supports the head and keeps the spine in a neutral position. The position of the head should not be tilted forward or backward.  · When in bed, try to find a position of comfort. A firm mattress is best. Try lying flat on your back with pillows under your knees. You can also try lying on your side with your knees bent up towards your chest and a pillow between your knees.  · At first, do not try to stretch out the sore spots. If there is a strain, it is not like the good soreness you get after exercising without an injury. In this case, stretching may make it worse.  · Avoid prolonged sitting, long car rides or travel. This puts more stress on the lower back than standing or walking.  · During the first 24 to 72 hours after an injury, apply an ice pack to the painful area for 20 minutes and then remove it for 20 minutes over a period of 60 to 90 minutes or several times a day.   · You can alternate ice and heat therapies. Talk with your healthcare provider about the best treatment for your back or neck pain. As a safety precaution, do not use a heating pad at bedtime. Sleeping with a heating pad can lead to skin burns or tissue damage.  · Therapeutic massage can help relax the back and neck muscles without stretching them.  · Be aware of safe lifting methods and do not lift anything over 15 pounds until all the pain is gone.  Medications  Talk to your healthcare provider before using medicine, especially if you have other medical problems or are taking other medicines.  · You may use over-the-counter medicine to control pain, unless another pain medicine was prescribed. If you have chronic conditions like diabetes, liver or kidney disease, stomach ulcers,  gastrointestinal bleeding, or are taking blood thinner  medicines.  · Be careful if you are given pain medicines, narcotics, or medicine for muscle spasm. They can cause drowsiness, and can affect your coordination, reflexes, and judgment. Do not drive or operate heavy machinery.  Follow-up care  Follow up with your healthcare provider, or as advised. Physical therapy or further tests may be needed.  If X-rays were taken, you will be notified of any new findings that may affect your care.  Call 911  Seek emergency medical care if any of the following occur:  · Trouble breathing  · Confusion  · Very drowsy or trouble awakening  · Fainting or loss of consciousness  · Rapid or very slow heart rate  · Loss of bowel or bladder control  When to seek medical advice  Call your healthcare provider right away if any of these occur:  · Pain becomes worse or spreads into your arms or legs  · Weakness, numbness or pain in one or both arms or legs  · Numbness in the groin area  · Difficulty walking  · Fever of 100.4ºF (38ºC) or higher, or as directed by your healthcare provider  Date Last Reviewed: 7/1/2016 © 2000-2016 [employer]. [address] [place] [id number]. All rights reserved. This information is not intended as a substitute for professional medical care. Always follow your healthcare professional's instructions.          Back Care Tips    Caring for your back  These are things you can do to prevent a recurrence of acute back pain and to reduce symptoms from chronic back pain:  · Maintain a healthy weight. If you are overweight, losing weight will help most types of back pain.  · Exercise is an important part of recovery from most types of back pain. The muscles behind and in front of the spine support the back. This means strengthening both the back muscles and the abdominal muscles will provide better support for your spine.   · Swimming and brisk walking are good overall exercises to improve your fitness level.  · Practice safe lifting methods  (below).  · Practice good posture when sitting, standing and walking. Avoid prolonged sitting. This puts more stress on the lower back than standing or walking.  · Wear quality shoes with sufficient arch support. Foot and ankle alignment can affect back symptoms. Women should avoid wearing high heels.  · Therapeutic massage can help relax the back muscles without stretching them.  · During the first 24 to 72 hours after an acute injury or flare-up of chronic back pain, apply an ice pack to the painful area for 20 minutes and then remove it for 20 minutes, over a period of 60 to 90 minutes, or several times a day. As a safety precaution, do not use a heating pad at bedtime. Sleeping on a heating pad can lead to skin burns or tissue damage.  · You can alternate ice and heat therapies.  Medications  Talk to your healthcare provider before using medicines, especially if you have other medical problems or are taking other medicines.  · You may use acetaminophen or ibuprofen to control pain, unless your healthcare provider prescribed other pain medicine. If you have chronic conditions like diabetes, liver or kidney disease, stomach ulcers, or gastrointestinal bleeding, or are taking blood thinners, talk with your healthcare provider before taking any medicines.  · Be careful if you are given prescription pain medicines, narcotics, or medicine for muscle spasm. They can cause drowsiness, affect your coordination, reflexes, and judgment. Do not drive or operate heavy machinery while taking these types of medicines. Take prescription pain medicine only as prescribed by your healthcare provider.  Lumbar stretch  Here is a simple stretching exercise that will help relax muscle spasm and keep your back more limber. If exercise makes your back pain worse, dont do it.  · Lie on your back with your knees bent and both feet on the ground.  · Slowly raise your left knee to your chest as you flatten your lower back against the  floor. Hold for 5 seconds.  · Relax and repeat the exercise with your right knee.  · Do 10 of these exercises for each leg.  Safe lifting method  · Dont bend over at the waist to lift an object off the floor.  Instead, bend your knees and hips in a squat.   · Keep your back and head upright  · Hold the object close to your body, directly in front of you.  · Straighten your legs to lift the object.   · Lower the object to the floor in the reverse fashion.  · If you must slide something across the floor, push it.  Posture tips  Sitting  Sit in chairs with straight backs or low-back support. Keep your knees lower than your hips, with your feet flat on the floor.  When driving, sit up straight. Adjust the seat forward so you are not leaning toward the steering wheel.  A small pillow or rolled towel behind your lower back may help if you are driving long distances.   Standing  When standing for long periods, shift most of your weight to one leg at a time. Alternate legs every few minutes.   Sleeping  The best way to sleep is on your side with your knees bent. Put a low pillow under your head to support your neck in a neutral spine position. Avoid thick pillows that bend your neck to one side. Put a pillow between your legs to further relax your lower back. If you sleep on your back, put pillows under your knees to support your legs in a slightly flexed position. Use a firm mattress. If your mattress sags, replace it, or use a 1/2-inch plywood board under the mattress to add support.  Follow-up care  Follow up with your healthcare provider, or as advised.  If X-rays, a CT scan or an MRI scan were taken, they will be reviewed by a radiologist. You will be notified of any new findings that may affect your care.  Call 911  Seek emergency medical care if any of the following occur:  · Trouble breathing  · Confusion  · Very drowsy  · Fainting or loss of consciousness  · Rapid or very slow heart rate  · Loss of  bowel or  "bladder control  When to seek medical care  Call your healthcare provider if any of the following occur:  · Pain becomes worse or spreads to your arms or legs  · Weakness or numbness in one or both arms or legs  · Numbness in the groin area  Date Last Reviewed: 6/1/2016 © 2000-2016 [employer]. [address] [place] [id number]. All rights reserved. This information is not intended as a substitute for professional medical care. Always follow your healthcare professional's instructions.            Go to the Emergency Room if symptoms or condition worsens in any way    Bladder Infection, Female (Adult)    Urine is normally doesn't have any bacteria in it. But bacteria can get into the urinary tract from the skin around the rectum. Or they can travel in the blood from elsewhere in the body. Once they are in your urinary tract, they can cause infection in the urethra (urethritis), the bladder (cystitis), or the kidneys (pyelonephritis).  The most common place for an infection is in the bladder. This is called a bladder infection. This is one of the most common infections in women. Most bladder infections are easily treated. They are not serious unless the infection spreads to the kidney.  The phrases "bladder infection," "UTI," and "cystitis" are often used to describe the same thing. But they are not always the same. Cystitis is an inflammation of the bladder. The most common cause of cystitis is an infection.  Symptoms  The infection causes inflammation in the urethra and bladder. This causes many of the symptoms. The most common symptoms of a bladder infection are:  · Pain or burning when urinating  · Having to urinate more often than usual  · Urgent need to urinate  · Only a small amount of urine comes out  · Blood in urine  · Abdominal discomfort. This is usually in the lower abdomen above the pubic bone.  · Cloudy urine  · Strong- or bad-smelling urine  · Unable to urinate (urinary " retention)  · Unable to hold urine in (urinary incontinence)  · Fever  · Loss of appetite  · Confusion (in older adults)  Causes  Bladder infections are not contagious. You can't get one from someone else, from a toilet seat, or from sharing a bath.  The most common cause of bladder infections is bacteria from the bowels. The bacteria get onto the skin around the opening of the urethra. From there, they can get into the urine and travel up to the bladder, causing inflammation and infection. This usually happens because of:  · Wiping improperly after urinating. Always wipe from front to back.  · Bowel incontinence  · Pregnancy  · Procedures such as having a catheter inserted  · Older age  · Not emptying your bladder. This can allow bacteria a chance to grow in your urine.  · Dehydration  · Constipation  · Sex  · Use of a diaphragm for birth control   Treatment  Bladder infections are diagnosed by a urine test. They are treated with antibiotics and usually clear up quickly without complications. Treatment helps prevent a more serious kidney infection.  Medicines  Medicines can help in the treatment of a bladder infection:  · Take antibiotics until they are used up, even if you feel better. It is important to finish them to make sure the infection has cleared.  · You can use acetaminophen or ibuprofen for pain, fever, or discomfort, unless another medicine was prescribed. If you have chronic liver or kidney disease, talk with your healthcare provider before using these medicines. Also talk with your provider if you've ever had a stomach ulcer or gastrointestinal bleeding, or are taking blood-thinner medicines.  · If you are given phenazopydridine to reduce burning with urination, it will cause your urine to become a bright orange color. This can stain clothing.  Care and prevention  These self-care steps can help prevent future infections:  · Drink plenty of fluids to prevent dehydration and flush out your bladder. Do  this unless you must restrict fluids for other health reasons, or your doctor told you not to.  · Proper cleaning after going to the bathroom is important. Wipe from front to back after using the toilet to prevent the spread of bacteria.  · Urinate more often. Don't try to hold urine in for a long time.  · Wear loose-fitting clothes and cotton underwear. Avoid tight-fitting pants.  · Improve your diet and prevent constipation. Eat more fresh fruit and vegetables, and fiber, and less junk and fatty foods.  · Avoid sex until your symptoms are gone.  · Avoid caffeine, alcohol, and spicy foods. These can irritate your bladder.  · Urinate right after intercourse to flush out your bladder.  · If you use birth control pills and have frequent bladder infections, discuss it with your doctor.  Follow-up care  Call your healthcare provider if all symptoms are not gone after 3 days of treatment. This is especially important if you have repeat infections.  If a culture was done, you will be told if your treatment needs to be changed. If directed, you can call to find out the results.  If X-rays were done, you will be told if the results will affect your treatment.  Call 911  Call 911 if any of the following occur:  · Trouble breathing  · Hard to wake up or confusion  · Fainting or loss of consciousness  · Rapid heart rate  When to seek medical advice  Call your healthcare provider right away if any of these occur:  · Fever of 100.4ºF (38.0ºC) or higher, or as directed by your healthcare provider  · Symptoms are not better by the third day of treatment  · Back or belly (abdominal) pain that gets worse  · Repeated vomiting, or unable to keep medicine down  · Weakness or dizziness  · Vaginal discharge  · Pain, redness, or swelling in the outer vaginal area (labia)  Date Last Reviewed: 10/1/2016  © 0583-6186 [employer]. [address] [place] [address], [place] [address]. All rights reserved. This information is not intended as  a substitute for professional medical care. Always follow your healthcare professional's instructions.

## 2019-04-17 NOTE — PATIENT INSTRUCTIONS
Go to the Emergency Room if symptoms or condition worsens in any way    Follow up with Primary Care Provider or Back Specialist in 5-7 days if not improved    General Neck and Back Pain    Both neck and back pain are usually caused by injury to the muscles or ligaments of the spine. Sometimes the disks that separate each bone of the spine may cause pain by pressing on a nearby nerve. Back and neck pain may appear after a sudden twisting or bending force (such as in a car accident), or sometimes after a simple awkward movement. In either case, muscle spasm is often present and adds to the pain.  Acute neck and back pain usually gets better in 1 to 2 weeks. Pain related to disk disease, arthritis in the spinal joints or spinal stenosis (narrowing of the spinal canal) can become chronic and last for months or years.  Back and neck pain are common problems. Most people feel better in 1 or 2 weeks, and most of the rest in 1 to 2 months. Most people can remain active.  People experience and describe pain differently.  · Pain can be sharp, stabbing, shooting, aching, cramping, or burning  · Movement, standing, bending, lifting, sitting, or walking may worsen the pain  · Pain can be localized to one spot or area, or it can be more generalized  · Pain can spread or radiate upwards, downwards, to the front, or go down your arms  · Muscle spasm may occur.  Most of the time mechanical problems with the muscles or spine cause the pain. it is usually caused by an injury, whether known or not, to the muscles or ligaments. While illnesses can cause back pain, it is usually not caused by a serious illness. Pain is usually related to physical activity, whether sports, exercise, work, or normal activity. Sometimes it can occur without an identifiable cause. This can happen simply by stretching or moving wrong, without noting pain at the time. Other causes include:  · Overexertion, lifting, pushing, pulling incorrectly or too  aggressively.  · Sudden twisting, bending or stretching from an accident (car or fall), or accidental movement.  · Poor posture  · Poor conditioning, lack of regular exercise  · Spinal disc disease or arthritis  · Stress  · Pregnancy, or illness like appendicitis, bladder or kidney infection, pelvic infections   Home care  · For neck pain: Use a comfortable pillow that supports the head and keeps the spine in a neutral position. The position of the head should not be tilted forward or backward.  · When in bed, try to find a position of comfort. A firm mattress is best. Try lying flat on your back with pillows under your knees. You can also try lying on your side with your knees bent up towards your chest and a pillow between your knees.  · At first, do not try to stretch out the sore spots. If there is a strain, it is not like the good soreness you get after exercising without an injury. In this case, stretching may make it worse.  · Avoid prolonged sitting, long car rides or travel. This puts more stress on the lower back than standing or walking.  · During the first 24 to 72 hours after an injury, apply an ice pack to the painful area for 20 minutes and then remove it for 20 minutes over a period of 60 to 90 minutes or several times a day.   · You can alternate ice and heat therapies. Talk with your healthcare provider about the best treatment for your back or neck pain. As a safety precaution, do not use a heating pad at bedtime. Sleeping with a heating pad can lead to skin burns or tissue damage.  · Therapeutic massage can help relax the back and neck muscles without stretching them.  · Be aware of safe lifting methods and do not lift anything over 15 pounds until all the pain is gone.  Medications  Talk to your healthcare provider before using medicine, especially if you have other medical problems or are taking other medicines.  · You may use over-the-counter medicine to control pain, unless another pain  medicine was prescribed. If you have chronic conditions like diabetes, liver or kidney disease, stomach ulcers,  gastrointestinal bleeding, or are taking blood thinner medicines.  · Be careful if you are given pain medicines, narcotics, or medicine for muscle spasm. They can cause drowsiness, and can affect your coordination, reflexes, and judgment. Do not drive or operate heavy machinery.  Follow-up care  Follow up with your healthcare provider, or as advised. Physical therapy or further tests may be needed.  If X-rays were taken, you will be notified of any new findings that may affect your care.  Call 911  Seek emergency medical care if any of the following occur:  · Trouble breathing  · Confusion  · Very drowsy or trouble awakening  · Fainting or loss of consciousness  · Rapid or very slow heart rate  · Loss of bowel or bladder control  When to seek medical advice  Call your healthcare provider right away if any of these occur:  · Pain becomes worse or spreads into your arms or legs  · Weakness, numbness or pain in one or both arms or legs  · Numbness in the groin area  · Difficulty walking  · Fever of 100.4ºF (38ºC) or higher, or as directed by your healthcare provider  Date Last Reviewed: 7/1/2016  © 8085-7789 [employer]. [address] [place] [address], [place] [address]. All rights reserved. This information is not intended as a substitute for professional medical care. Always follow your healthcare professional's instructions.          Back Care Tips    Caring for your back  These are things you can do to prevent a recurrence of acute back pain and to reduce symptoms from chronic back pain:  · Maintain a healthy weight. If you are overweight, losing weight will help most types of back pain.  · Exercise is an important part of recovery from most types of back pain. The muscles behind and in front of the spine support the back. This means strengthening both the back muscles and the abdominal muscles  will provide better support for your spine.   · Swimming and brisk walking are good overall exercises to improve your fitness level.  · Practice safe lifting methods (below).  · Practice good posture when sitting, standing and walking. Avoid prolonged sitting. This puts more stress on the lower back than standing or walking.  · Wear quality shoes with sufficient arch support. Foot and ankle alignment can affect back symptoms. Women should avoid wearing high heels.  · Therapeutic massage can help relax the back muscles without stretching them.  · During the first 24 to 72 hours after an acute injury or flare-up of chronic back pain, apply an ice pack to the painful area for 20 minutes and then remove it for 20 minutes, over a period of 60 to 90 minutes, or several times a day. As a safety precaution, do not use a heating pad at bedtime. Sleeping on a heating pad can lead to skin burns or tissue damage.  · You can alternate ice and heat therapies.  Medications  Talk to your healthcare provider before using medicines, especially if you have other medical problems or are taking other medicines.  · You may use acetaminophen or ibuprofen to control pain, unless your healthcare provider prescribed other pain medicine. If you have chronic conditions like diabetes, liver or kidney disease, stomach ulcers, or gastrointestinal bleeding, or are taking blood thinners, talk with your healthcare provider before taking any medicines.  · Be careful if you are given prescription pain medicines, narcotics, or medicine for muscle spasm. They can cause drowsiness, affect your coordination, reflexes, and judgment. Do not drive or operate heavy machinery while taking these types of medicines. Take prescription pain medicine only as prescribed by your healthcare provider.  Lumbar stretch  Here is a simple stretching exercise that will help relax muscle spasm and keep your back more limber. If exercise makes your back pain worse, dont do  it.  · Lie on your back with your knees bent and both feet on the ground.  · Slowly raise your left knee to your chest as you flatten your lower back against the floor. Hold for 5 seconds.  · Relax and repeat the exercise with your right knee.  · Do 10 of these exercises for each leg.  Safe lifting method  · Dont bend over at the waist to lift an object off the floor.  Instead, bend your knees and hips in a squat.   · Keep your back and head upright  · Hold the object close to your body, directly in front of you.  · Straighten your legs to lift the object.   · Lower the object to the floor in the reverse fashion.  · If you must slide something across the floor, push it.  Posture tips  Sitting  Sit in chairs with straight backs or low-back support. Keep your knees lower than your hips, with your feet flat on the floor.  When driving, sit up straight. Adjust the seat forward so you are not leaning toward the steering wheel.  A small pillow or rolled towel behind your lower back may help if you are driving long distances.   Standing  When standing for long periods, shift most of your weight to one leg at a time. Alternate legs every few minutes.   Sleeping  The best way to sleep is on your side with your knees bent. Put a low pillow under your head to support your neck in a neutral spine position. Avoid thick pillows that bend your neck to one side. Put a pillow between your legs to further relax your lower back. If you sleep on your back, put pillows under your knees to support your legs in a slightly flexed position. Use a firm mattress. If your mattress sags, replace it, or use a 1/2-inch plywood board under the mattress to add support.  Follow-up care  Follow up with your healthcare provider, or as advised.  If X-rays, a CT scan or an MRI scan were taken, they will be reviewed by a radiologist. You will be notified of any new findings that may affect your care.  Call 911  Seek emergency medical care if any of the  "following occur:  · Trouble breathing  · Confusion  · Very drowsy  · Fainting or loss of consciousness  · Rapid or very slow heart rate  · Loss of  bowel or bladder control  When to seek medical care  Call your healthcare provider if any of the following occur:  · Pain becomes worse or spreads to your arms or legs  · Weakness or numbness in one or both arms or legs  · Numbness in the groin area  Date Last Reviewed: 6/1/2016 © 2000-2016 [employer]. [address] [place] [address]. All rights reserved. This information is not intended as a substitute for professional medical care. Always follow your healthcare professional's instructions.            Go to the Emergency Room if symptoms or condition worsens in any way    Bladder Infection, Female (Adult)    Urine is normally doesn't have any bacteria in it. But bacteria can get into the urinary tract from the skin around the rectum. Or they can travel in the blood from elsewhere in the body. Once they are in your urinary tract, they can cause infection in the urethra (urethritis), the bladder (cystitis), or the kidneys (pyelonephritis).  The most common place for an infection is in the bladder. This is called a bladder infection. This is one of the most common infections in women. Most bladder infections are easily treated. They are not serious unless the infection spreads to the kidney.  The phrases "bladder infection," "UTI," and "cystitis" are often used to describe the same thing. But they are not always the same. Cystitis is an inflammation of the bladder. The most common cause of cystitis is an infection.  Symptoms  The infection causes inflammation in the urethra and bladder. This causes many of the symptoms. The most common symptoms of a bladder infection are:  · Pain or burning when urinating  · Having to urinate more often than usual  · Urgent need to urinate  · Only a small amount of urine comes out  · Blood in urine  · Abdominal " discomfort. This is usually in the lower abdomen above the pubic bone.  · Cloudy urine  · Strong- or bad-smelling urine  · Unable to urinate (urinary retention)  · Unable to hold urine in (urinary incontinence)  · Fever  · Loss of appetite  · Confusion (in older adults)  Causes  Bladder infections are not contagious. You can't get one from someone else, from a toilet seat, or from sharing a bath.  The most common cause of bladder infections is bacteria from the bowels. The bacteria get onto the skin around the opening of the urethra. From there, they can get into the urine and travel up to the bladder, causing inflammation and infection. This usually happens because of:  · Wiping improperly after urinating. Always wipe from front to back.  · Bowel incontinence  · Pregnancy  · Procedures such as having a catheter inserted  · Older age  · Not emptying your bladder. This can allow bacteria a chance to grow in your urine.  · Dehydration  · Constipation  · Sex  · Use of a diaphragm for birth control   Treatment  Bladder infections are diagnosed by a urine test. They are treated with antibiotics and usually clear up quickly without complications. Treatment helps prevent a more serious kidney infection.  Medicines  Medicines can help in the treatment of a bladder infection:  · Take antibiotics until they are used up, even if you feel better. It is important to finish them to make sure the infection has cleared.  · You can use acetaminophen or ibuprofen for pain, fever, or discomfort, unless another medicine was prescribed. If you have chronic liver or kidney disease, talk with your healthcare provider before using these medicines. Also talk with your provider if you've ever had a stomach ulcer or gastrointestinal bleeding, or are taking blood-thinner medicines.  · If you are given phenazopydridine to reduce burning with urination, it will cause your urine to become a bright orange color. This can stain clothing.  Care and  prevention  These self-care steps can help prevent future infections:  · Drink plenty of fluids to prevent dehydration and flush out your bladder. Do this unless you must restrict fluids for other health reasons, or your doctor told you not to.  · Proper cleaning after going to the bathroom is important. Wipe from front to back after using the toilet to prevent the spread of bacteria.  · Urinate more often. Don't try to hold urine in for a long time.  · Wear loose-fitting clothes and cotton underwear. Avoid tight-fitting pants.  · Improve your diet and prevent constipation. Eat more fresh fruit and vegetables, and fiber, and less junk and fatty foods.  · Avoid sex until your symptoms are gone.  · Avoid caffeine, alcohol, and spicy foods. These can irritate your bladder.  · Urinate right after intercourse to flush out your bladder.  · If you use birth control pills and have frequent bladder infections, discuss it with your doctor.  Follow-up care  Call your healthcare provider if all symptoms are not gone after 3 days of treatment. This is especially important if you have repeat infections.  If a culture was done, you will be told if your treatment needs to be changed. If directed, you can call to find out the results.  If X-rays were done, you will be told if the results will affect your treatment.  Call 911  Call 911 if any of the following occur:  · Trouble breathing  · Hard to wake up or confusion  · Fainting or loss of consciousness  · Rapid heart rate  When to seek medical advice  Call your healthcare provider right away if any of these occur:  · Fever of 100.4ºF (38.0ºC) or higher, or as directed by your healthcare provider  · Symptoms are not better by the third day of treatment  · Back or belly (abdominal) pain that gets worse  · Repeated vomiting, or unable to keep medicine down  · Weakness or dizziness  · Vaginal discharge  · Pain, redness, or swelling in the outer vaginal area (labia)  Date Last Reviewed:  10/1/2016  © 8420-2861 [employer], [employer] [address] [place] [address], [place] [address]. All rights reserved. This information is not intended as a substitute for professional medical care. Always follow your healthcare professional's instructions.

## 2019-04-29 ENCOUNTER — HOSPITAL ENCOUNTER (OUTPATIENT)
Dept: RADIOLOGY | Facility: HOSPITAL | Age: 29
Discharge: HOME | End: 2019-04-29
Attending: INTERNAL MEDICINE
Payer: COMMERCIAL

## 2019-04-29 ENCOUNTER — OFFICE VISIT (OUTPATIENT)
Dept: INTERNAL MEDICINE | Facility: CLINIC | Age: 29
End: 2019-04-29
Payer: COMMERCIAL

## 2019-04-29 VITALS
DIASTOLIC BLOOD PRESSURE: 72 MMHG | HEART RATE: 72 BPM | SYSTOLIC BLOOD PRESSURE: 94 MMHG | TEMPERATURE: 98 F | HEIGHT: 67 IN | BODY MASS INDEX: 36.2 KG/M2 | WEIGHT: 230.63 LBS

## 2019-04-29 DIAGNOSIS — M54.42 ACUTE RIGHT-SIDED LOW BACK PAIN WITH BILATERAL SCIATICA: Primary | ICD-10-CM

## 2019-04-29 DIAGNOSIS — L60.8 NAIL DISCOLORATION: ICD-10-CM

## 2019-04-29 DIAGNOSIS — M54.41 ACUTE RIGHT-SIDED LOW BACK PAIN WITH BILATERAL SCIATICA: Primary | ICD-10-CM

## 2019-04-29 DIAGNOSIS — L60.2 THICK NAIL: ICD-10-CM

## 2019-04-29 DIAGNOSIS — M54.42 ACUTE RIGHT-SIDED LOW BACK PAIN WITH BILATERAL SCIATICA: ICD-10-CM

## 2019-04-29 DIAGNOSIS — M54.41 ACUTE RIGHT-SIDED LOW BACK PAIN WITH BILATERAL SCIATICA: ICD-10-CM

## 2019-04-29 PROCEDURE — 99213 PR OFFICE/OUTPT VISIT, EST, LEVL III, 20-29 MIN: ICD-10-PCS | Mod: S$GLB,,, | Performed by: INTERNAL MEDICINE

## 2019-04-29 PROCEDURE — 99213 OFFICE O/P EST LOW 20 MIN: CPT | Mod: S$GLB,,, | Performed by: INTERNAL MEDICINE

## 2019-04-29 PROCEDURE — 72110 X-RAY EXAM L-2 SPINE 4/>VWS: CPT | Mod: TC,PO

## 2019-04-29 PROCEDURE — 72110 XR LUMBAR SPINE COMPLETE 5 VIEW: ICD-10-PCS | Mod: 26,,, | Performed by: RADIOLOGY

## 2019-04-29 PROCEDURE — 3008F BODY MASS INDEX DOCD: CPT | Mod: CPTII,S$GLB,, | Performed by: INTERNAL MEDICINE

## 2019-04-29 PROCEDURE — 99999 PR PBB SHADOW E&M-EST. PATIENT-LVL III: CPT | Mod: PBBFAC,,, | Performed by: INTERNAL MEDICINE

## 2019-04-29 PROCEDURE — 72110 X-RAY EXAM L-2 SPINE 4/>VWS: CPT | Mod: 26,,, | Performed by: RADIOLOGY

## 2019-04-29 PROCEDURE — 99999 PR PBB SHADOW E&M-EST. PATIENT-LVL III: ICD-10-PCS | Mod: PBBFAC,,, | Performed by: INTERNAL MEDICINE

## 2019-04-29 PROCEDURE — 3008F PR BODY MASS INDEX (BMI) DOCUMENTED: ICD-10-PCS | Mod: CPTII,S$GLB,, | Performed by: INTERNAL MEDICINE

## 2019-04-29 RX ORDER — CICLOPIROX 80 MG/ML
SOLUTION TOPICAL NIGHTLY
Qty: 6.6 ML | Refills: 0 | Status: SHIPPED | OUTPATIENT
Start: 2019-04-29 | End: 2019-07-22

## 2019-04-29 RX ORDER — DICLOFENAC SODIUM 50 MG/1
50 TABLET, DELAYED RELEASE ORAL 2 TIMES DAILY PRN
Qty: 30 TABLET | Refills: 0 | Status: SHIPPED | OUTPATIENT
Start: 2019-04-29 | End: 2019-07-22

## 2019-04-29 RX ORDER — METHOCARBAMOL 500 MG/1
500 TABLET, FILM COATED ORAL NIGHTLY PRN
Qty: 40 TABLET | Refills: 0
Start: 2019-04-29 | End: 2019-05-09

## 2019-04-30 ENCOUNTER — TELEPHONE (OUTPATIENT)
Dept: INTERNAL MEDICINE | Facility: CLINIC | Age: 29
End: 2019-04-30

## 2019-04-30 NOTE — TELEPHONE ENCOUNTER
----- Message from Janna Centeno MD sent at 4/29/2019 11:37 PM CDT -----  Message sent via patient portal.    ?PT

## 2019-04-30 NOTE — PROGRESS NOTES
Subjective:       Patient ID: Dora Hernandez is a 29 y.o. female who presents for Low-back Pain      Low-back Pain   This is a new problem. The current episode started more than 1 month ago (started ~2 weeks ago). The problem occurs constantly. The problem has been unchanged. Associated symptoms include abdominal pain and weakness. Pertinent negatives include no arthralgias, chest pain, chills, congestion, coughing, diaphoresis, fatigue, fever, headaches, joint swelling, myalgias, nausea, numbness, rash, urinary symptoms or vomiting. The symptoms are aggravated by walking. She has tried rest for the symptoms. The treatment provided no relief.        Review of Systems   Constitutional: Negative for chills, diaphoresis, fatigue and fever.   HENT: Negative for congestion, ear discharge, ear pain, postnasal drip and rhinorrhea.    Respiratory: Negative for cough, chest tightness and shortness of breath.    Cardiovascular: Negative for chest pain, palpitations and leg swelling.   Gastrointestinal: Positive for abdominal pain. Negative for constipation, diarrhea, nausea and vomiting.   Genitourinary: Positive for pelvic pain. Negative for decreased urine volume, dysuria, frequency, hematuria and urgency.   Musculoskeletal: Positive for back pain. Negative for arthralgias, joint swelling and myalgias.   Skin: Negative for color change and rash.   Neurological: Positive for weakness. Negative for dizziness, numbness and headaches.         Answers for HPI/ROS submitted by the patient on 4/23/2019   Back pain  Chronicity: new  Onset: 1 to 4 weeks ago  Frequency: constantly  Progression since onset: waxing and waning  Pain location: sacro-iliac  Pain quality: shooting  Radiates to: right thigh  Pain - numeric: 6/10  Pain is: the same all the time  Aggravated by: bending, position, sitting, standing  Stiffness is present: all day  abdominal pain: Yes  bladder incontinence: No  bowel incontinence: No  chest pain:  No  dysuria: No  fever: No  headaches: No  leg pain: Yes  numbness: No  paresis: No  paresthesias: Yes  pelvic pain: Yes  perianal numbness: No  tingling: No  weakness: Yes  weight loss: No  genital pain: No  hematuria: No  Pain severity: moderate  Treatments tried: analgesics, NSAIDs, heat, ice, muscle relaxant  Improvement on treatment: no relief    Objective:      Physical Exam   Constitutional: She is oriented to person, place, and time. Vital signs are normal. She appears well-developed and well-nourished. No distress.   HENT:   Head: Normocephalic and atraumatic.   Right Ear: Hearing and external ear normal.   Left Ear: Hearing and external ear normal.   Nose: Nose normal.   Mouth/Throat: Uvula is midline and mucous membranes are normal.   Eyes: Lids are normal.   Cardiovascular: Normal rate, regular rhythm, normal heart sounds and intact distal pulses.   No murmur heard.  Pulmonary/Chest: Effort normal and breath sounds normal. She has no wheezes.   Abdominal: Soft. Bowel sounds are normal. She exhibits no distension. There is no tenderness. There is no rigidity, no rebound, no guarding and no CVA tenderness.   Musculoskeletal: Normal range of motion. She exhibits no edema.        Cervical back: She exhibits no tenderness, no bony tenderness and no pain.        Thoracic back: She exhibits no bony tenderness and no pain.        Lumbar back: She exhibits tenderness, pain and spasm.   Neurological: She is alert and oriented to person, place, and time. She has normal strength and normal reflexes. She displays normal reflexes. No sensory deficit. Coordination and gait normal.   Negative straight leg raise   Skin: Skin is warm, dry and intact. No rash noted. She is not diaphoretic.   Thickened discolored great toe nails   Psychiatric: She has a normal mood and affect.   Vitals reviewed.      Assessment/Plan:       1. Acute right-sided low back pain with bilateral sciatica  - X-Ray Lumbar Spine Complete 5 View;  Future  - Voltaren 50mg bid prn pain  - may try robaxin 500mg qhs prn spasms    2. Thick nail  - Ambulatory Referral to Podiatry  - unclear if this is fungal but may try Penlac    3. Nail discoloration  - Ambulatory Referral to Podiatry    Janna Centeno MD          Answers for HPI/ROS submitted by the patient on 4/23/2019   Back pain  genital pain: No

## 2019-04-30 NOTE — TELEPHONE ENCOUNTER
Spoke with pt; notified xray normal; asked if she wanted order in for PT; pt states she will speak with her  and let us know.

## 2019-05-01 ENCOUNTER — TELEPHONE (OUTPATIENT)
Dept: INTERNAL MEDICINE | Facility: CLINIC | Age: 29
End: 2019-05-01

## 2019-05-20 ENCOUNTER — PATIENT MESSAGE (OUTPATIENT)
Dept: OBSTETRICS AND GYNECOLOGY | Facility: CLINIC | Age: 29
End: 2019-05-20

## 2019-05-22 RX ORDER — FLUCONAZOLE 150 MG/1
150 TABLET ORAL ONCE
Qty: 1 TABLET | Refills: 0 | Status: SHIPPED | OUTPATIENT
Start: 2019-05-22 | End: 2019-05-22

## 2019-06-19 ENCOUNTER — OFFICE VISIT (OUTPATIENT)
Dept: URGENT CARE | Facility: CLINIC | Age: 29
End: 2019-06-19
Payer: COMMERCIAL

## 2019-06-19 VITALS
SYSTOLIC BLOOD PRESSURE: 115 MMHG | OXYGEN SATURATION: 98 % | HEIGHT: 67 IN | HEART RATE: 91 BPM | DIASTOLIC BLOOD PRESSURE: 81 MMHG | RESPIRATION RATE: 16 BRPM | TEMPERATURE: 98 F | BODY MASS INDEX: 36.1 KG/M2 | WEIGHT: 230 LBS

## 2019-06-19 DIAGNOSIS — J30.2 SEASONAL ALLERGIC RHINITIS, UNSPECIFIED TRIGGER: ICD-10-CM

## 2019-06-19 DIAGNOSIS — R45.89 ANXIOUS APPEARANCE: Primary | ICD-10-CM

## 2019-06-19 DIAGNOSIS — R51.9 NONINTRACTABLE HEADACHE, UNSPECIFIED CHRONICITY PATTERN, UNSPECIFIED HEADACHE TYPE: ICD-10-CM

## 2019-06-19 DIAGNOSIS — R11.0 NAUSEA: ICD-10-CM

## 2019-06-19 PROCEDURE — 99214 PR OFFICE/OUTPT VISIT, EST, LEVL IV, 30-39 MIN: ICD-10-PCS | Mod: S$GLB,,, | Performed by: PHYSICIAN ASSISTANT

## 2019-06-19 PROCEDURE — 3008F PR BODY MASS INDEX (BMI) DOCUMENTED: ICD-10-PCS | Mod: CPTII,S$GLB,, | Performed by: PHYSICIAN ASSISTANT

## 2019-06-19 PROCEDURE — S0119 ONDANSETRON 4 MG: HCPCS | Mod: S$GLB,,, | Performed by: PHYSICIAN ASSISTANT

## 2019-06-19 PROCEDURE — 99214 OFFICE O/P EST MOD 30 MIN: CPT | Mod: S$GLB,,, | Performed by: PHYSICIAN ASSISTANT

## 2019-06-19 PROCEDURE — 3008F BODY MASS INDEX DOCD: CPT | Mod: CPTII,S$GLB,, | Performed by: PHYSICIAN ASSISTANT

## 2019-06-19 PROCEDURE — S0119 PR ONDANSETRON, ORAL, 4MG: ICD-10-PCS | Mod: S$GLB,,, | Performed by: PHYSICIAN ASSISTANT

## 2019-06-19 RX ORDER — IBUPROFEN 600 MG/1
600 TABLET ORAL EVERY 6 HOURS PRN
Qty: 20 TABLET | Refills: 0 | Status: SHIPPED | OUTPATIENT
Start: 2019-06-19 | End: 2019-07-22

## 2019-06-19 RX ORDER — FLUTICASONE PROPIONATE 50 MCG
1 SPRAY, SUSPENSION (ML) NASAL DAILY PRN
Qty: 1 BOTTLE | Refills: 0 | Status: SHIPPED | OUTPATIENT
Start: 2019-06-19 | End: 2019-07-22

## 2019-06-19 RX ORDER — ONDANSETRON 4 MG/1
4 TABLET, ORALLY DISINTEGRATING ORAL
Status: COMPLETED | OUTPATIENT
Start: 2019-06-19 | End: 2019-06-19

## 2019-06-19 RX ORDER — HYDROXYZINE PAMOATE 25 MG/1
25 CAPSULE ORAL 4 TIMES DAILY
Qty: 30 CAPSULE | Refills: 0 | Status: SHIPPED | OUTPATIENT
Start: 2019-06-19 | End: 2019-07-22

## 2019-06-19 RX ORDER — ONDANSETRON 4 MG/1
4 TABLET, ORALLY DISINTEGRATING ORAL EVERY 8 HOURS PRN
Qty: 12 TABLET | Refills: 0 | Status: SHIPPED | OUTPATIENT
Start: 2019-06-19 | End: 2020-02-04

## 2019-06-19 RX ORDER — LORATADINE 10 MG/1
10 TABLET ORAL DAILY
Qty: 30 TABLET | Refills: 0 | Status: SHIPPED | OUTPATIENT
Start: 2019-06-19 | End: 2019-07-22

## 2019-06-19 RX ORDER — IBUPROFEN 200 MG
600 TABLET ORAL
Status: COMPLETED | OUTPATIENT
Start: 2019-06-19 | End: 2019-06-19

## 2019-06-19 RX ADMIN — ONDANSETRON 4 MG: 4 TABLET, ORALLY DISINTEGRATING ORAL at 11:06

## 2019-06-19 RX ADMIN — Medication 600 MG: at 11:06

## 2019-06-19 NOTE — PROGRESS NOTES
"Subjective:       Patient ID: Dora Hernandez is a 29 y.o. female.    Vitals:    06/19/19 1057   BP: 115/81   Pulse: 91   Resp: 16   Temp: 98.3 °F (36.8 °C)   TempSrc: Oral   SpO2: 98%   Weight: 104.3 kg (230 lb)   Height: 5' 7" (1.702 m)       Chief Complaint: Headache    Patient reports being trapped on elevator that was going up and down constantly before they were able to get her out this morning. Patient reports that during this incident, it made her very anxious and nervous and thinks that the pressure changes caused her to have a nose bleed and headache. Patient also reports CP that has completely resolved. Patient reports that she did not take anything OTC prior to arrival today. Patient reports that she has calmed down since this morning, but still feels "shaken up" about it. Patient currently denies fever, chills, body aches, CP, SOB, wheezing, abdominal pain, N/V/D/C, photophobia, blurry vision, dizziness, syncope, numbness, tingling or weakness.    Headache    This is a new problem. The current episode started today. The problem occurs constantly. The problem has been rapidly worsening. The pain is located in the frontal region. The pain does not radiate. The pain quality is not similar to prior headaches. The quality of the pain is described as pulsating. The pain is at a severity of 8/10. Associated symptoms include nausea. Pertinent negatives include no abdominal pain, abnormal behavior, anorexia, back pain, blurred vision, coughing, dizziness, drainage, ear pain, eye pain, eye redness, eye watering, facial sweating, fever, hearing loss, insomnia, loss of balance, muscle aches, neck pain, numbness, phonophobia, photophobia, rhinorrhea, scalp tenderness, seizures, sinus pressure, sore throat, swollen glands, tingling, tinnitus, visual change, vomiting, weakness or weight loss. The symptoms are aggravated by noise. She has tried nothing for the symptoms. Her past medical history is significant " for migraine headaches.     Review of Systems   Constitution: Negative for chills, diaphoresis, fever, malaise/fatigue and weight loss.   HENT: Positive for congestion and nosebleeds. Negative for ear discharge, ear pain, hearing loss, hoarse voice, odynophagia, rhinorrhea, sinus pressure, sore throat, stridor and tinnitus.    Eyes: Negative for blurred vision, double vision, pain, photophobia, redness and visual disturbance.   Cardiovascular: Negative for chest pain, dyspnea on exertion, leg swelling, near-syncope, palpitations and syncope.   Respiratory: Negative for cough, shortness of breath, snoring, sputum production and wheezing.    Skin: Negative for itching and rash.   Musculoskeletal: Negative for back pain, joint pain, joint swelling, myalgias, neck pain and stiffness.   Gastrointestinal: Positive for nausea. Negative for bloating, abdominal pain, anorexia, constipation, diarrhea, heartburn and vomiting.   Genitourinary: Negative for dysuria, hematuria and pelvic pain.   Neurological: Positive for headaches. Negative for disturbances in coordination, dizziness, focal weakness, light-headedness, loss of balance, numbness, paresthesias, seizures, sensory change, tingling, tremors, vertigo and weakness.   Psychiatric/Behavioral: Negative for altered mental status. The patient is nervous/anxious. The patient does not have insomnia.        Objective:      Physical Exam   Constitutional: She is oriented to person, place, and time. She appears well-developed and well-nourished. She is cooperative.  Non-toxic appearance. She does not appear ill. No distress.   Patient is stable, seated comfortably in NAD.   HENT:   Head: Normocephalic and atraumatic.   Right Ear: Hearing, tympanic membrane, external ear and ear canal normal.   Left Ear: Hearing, tympanic membrane, external ear and ear canal normal.   Nose: Nose normal. No mucosal edema, rhinorrhea or nasal deformity. No epistaxis. Right sinus exhibits no  maxillary sinus tenderness and no frontal sinus tenderness. Left sinus exhibits no maxillary sinus tenderness and no frontal sinus tenderness.   Mouth/Throat: Uvula is midline, oropharynx is clear and moist and mucous membranes are normal. No trismus in the jaw. Normal dentition. No uvula swelling. No posterior oropharyngeal erythema.   Eyes: Pupils are equal, round, and reactive to light. Conjunctivae, EOM and lids are normal. Right eye exhibits no discharge. Left eye exhibits no discharge. No scleral icterus.   Sclera clear bilat   Neck: Trachea normal, normal range of motion, full passive range of motion without pain and phonation normal. Neck supple.   Cardiovascular: Normal rate, regular rhythm, normal heart sounds, intact distal pulses and normal pulses.   Pulmonary/Chest: Effort normal and breath sounds normal. No accessory muscle usage or stridor. No respiratory distress. She has no decreased breath sounds. She has no wheezes. She has no rhonchi. She has no rales.   Abdominal: Soft. Normal appearance and bowel sounds are normal. She exhibits no distension, no pulsatile midline mass and no mass. There is no tenderness. There is no rigidity, no rebound, no guarding, no CVA tenderness, no tenderness at McBurney's point and negative Andrews's sign. No hernia.   Musculoskeletal: Normal range of motion. She exhibits no edema or deformity.   FROM to upper and lower extremities bilateral. 5/5 strength and full sensation bilateral. 2+ pulses bilateral. No pitting edema to bilateral lower extremities. No numbness or tingling. Negative straight leg raise. Able to ambulate without difficulty.   Lymphadenopathy:     She has no cervical adenopathy.   Neurological: She is alert and oriented to person, place, and time. She has normal strength and normal reflexes. No cranial nerve deficit or sensory deficit. She exhibits normal muscle tone. She displays a negative Romberg sign. Coordination normal. GCS eye subscore is 4. GCS  verbal subscore is 5. GCS motor subscore is 6.   Skin: Skin is warm, dry and intact. Capillary refill takes less than 2 seconds. No rash noted. She is not diaphoretic. No pallor.   Psychiatric: She has a normal mood and affect. Her speech is normal and behavior is normal. Judgment and thought content normal. Cognition and memory are normal.   Nursing note and vitals reviewed.      Assessment:       1. Anxious appearance    2. Nonintractable headache, unspecified chronicity pattern, unspecified headache type    3. Seasonal allergic rhinitis, unspecified trigger    4. Nausea        Plan:       Dora was seen today for headache.    Diagnoses and all orders for this visit:    Anxious appearance  -     hydrOXYzine pamoate (VISTARIL) 25 MG Cap; Take 1 capsule (25 mg total) by mouth 4 (four) times daily.    Nonintractable headache, unspecified chronicity pattern, unspecified headache type  -     ibuprofen tablet 600 mg  -     ibuprofen (ADVIL,MOTRIN) 600 MG tablet; Take 1 tablet (600 mg total) by mouth every 6 (six) hours as needed for Pain.    Seasonal allergic rhinitis, unspecified trigger  -     loratadine (CLARITIN) 10 mg tablet; Take 1 tablet (10 mg total) by mouth once daily.  -     fluticasone propionate (FLONASE ALLERGY RELIEF) 50 mcg/actuation nasal spray; 1 spray (50 mcg total) by Each Nare route daily as needed.    Nausea  -     ondansetron disintegrating tablet 4 mg  -     ondansetron (ZOFRAN-ODT) 4 MG TbDL; Take 1 tablet (4 mg total) by mouth every 8 (eight) hours as needed (nausea).    Advised patient to follow-up with her PCP for further evaluation this week and also gave her strict ER precautions. Also discussed with patient coping mechanisms with anxiety and advised patient to review anxiety handout below. Patient reports feeling improved after Ibuprofen and Zofran were given in clinic today. Patient is stable, seated comfortably in NAD upon discharge. Patient aware, verbalized understanding and agreed  with patient's plan of care.    Patient Instructions     If you were prescribed a narcotic or controlled medication, do not drive or operate heavy equipment or machinery while taking these medications.  You must understand that you've received an Urgent Care treatment only and that you may be released before all your medical problems are known or treated. You, the patient, will arrange for follow up care as instructed.  Follow up with your PCP or specialty clinic as directed if not improved or as needed. You can call [phone] to schedule an appointment with the appropriate provider.  If your condition worsens we recommend that you receive another evaluation at the Emergency Department for any concerns or worsening of condition.  Patient aware and verbalized understanding.    Increase fluids and rest is important.  Avoid contact with sick individuals.  Humidifier use at home.  Claritin RX as prescribed daily for seasonal allergies.  Flonase Nasal [place] RX as prescribed for nasal congestion/seasonal allergies.  Vistaril RX as prescribed for anxiety - will make you drowsy!  Zofran RX as prescribed for nausea as needed.  Ibuprofen RX as prescribed for headache as needed.  Follow-up with your PCP in the next 48hrs or sooner for re-evaluation especially if no improvement in symptoms.  Follow-up in the ER for any worsening of symptoms such as shortness of breath, chest pain, wheezing, new fever, increasing ear pain, neck stiffness, dizziness, weakness etc.  Patient aware and verbalized understanding.    Self-Care for Headaches  Most headaches aren't serious and can be relieved with self-care. But some headaches may be a sign of another health problem like eye trouble or high blood pressure. To find the best treatment, learn what kind of headaches you get. For tension headaches, self-care will usually help. To treat migraines, ask your healthcare provider for advice. It is also possible to get both tension and  "migraine headaches. Self-care involves relieving the pain and avoiding headache triggers if you can.    Ways to reduce pain and tension  Try these steps:  · Apply a cold compress or ice pack to the pain site.  · Drink fluids. If nausea makes it hard to drink, try sucking on ice.  · Rest. Protect yourself from bright light and loud noises.  · Calm your emotions by imagining a peaceful scene.  · Massage tight neck, shoulder, and head muscles.  · To relax muscles, soak in a hot bath or use a hot shower.  Use medicines  Aspirin or aspirin substitutes, such as ibuprofen and acetaminophen, can relieve headache. Remember: Never give aspirin to anyone 18 years old or younger because of the risk of developing Reye syndrome. Use pain medicines only when necessary.  Track your headaches  Keeping a headache diary can help you and your healthcare provider identify what's causing your headaches:  · Note when each headache happens.  · Identify your activities and the foods you've eaten 6 to 8 hours before the headache began.  · Look for any trends or "triggers."  Signs of tension headache  Any of the following can be signs:  · Dull pain or feeling of pressure in a tight band around your head  · Pain in your neck or shoulders  · Headache without a definite beginning or end  · Headache after an activity such as driving or working on a computer  Signs of migraine  Any of the following can be signs:  · Throbbing pain on one or both sides of your head  · Nausea or vomiting  · Extreme sensitivity to light, sound, and smells  · Bright spots, flashes, or other visual changes  · Pain or nausea so severe that you can't continue your daily activities  Call your healthcare provider   If you have any of the following symptoms, contact your healthcare provider:  · A headache that lingers after a recent injury or bump to the head.  · A fever with a stiff neck or pain when you bend your head toward your chest.  · A headache along with slurred " "speech, changes in your vision, or numbness or weakness in your arms or legs.  · A headache for longer than 3 days.  · Frequent headaches, especially in the morning.  · Headaches with seizures   · Seek immediate medical attention if you have a headache that you would call "the worst headache you have ever had."   Date Last Reviewed: 10/4/2015  © 6628-6291 [employer]. [address] [place] [address], [place] [address]. All rights reserved. This information is not intended as a substitute for professional medical care. Always follow your healthcare professional's instructions.  Anxiety Reaction  Anxiety is the feeling we all get when we think something bad might happen. It is a normal response to stress and usually causes only a mild reaction. When anxiety becomes more severe, it can interfere with daily life. In some cases, you may not even be aware of what it is youre anxious about. There may also be a genetic link or it may be a learned behavior in the home.  Both psychological and physical triggers cause stress reaction. It's often a response to fear or emotional stress, real or imagined. This stress may come from home, family, work, or social relationships.  During an anxiety reaction, you may feel:  · Helpless  · Nervous  · Depressed  · Irritable  Your body may show signs of anxiety in many ways. You may experience:  · Dry mouth  · Shakiness  · Dizziness  · Weakness  · Trouble breathing  · Breathing fast (hyperventilating)  · Chest pressure  · Sweating  · Headache  · Nausea  · Diarrhea  · Tiredness  · Inability to sleep  · Sexual problems  Home care  · Try to locate the sources of stress in your life. They may not be obvious. These may include:  ¨ Daily hassles of life (traffic jams, missed appointments, car troubles, etc.)  ¨ Major life changes, both good (new baby, job promotion) and bad (loss of job, loss of loved one)  ¨ Overload: feeling that you have too many responsibilities and can't take care of " all of them at once  ¨ Feeling helpless, feeling that your problems are beyond what youre able to solve  · Notice how your body reacts to stress. Learn to listen to your body signals. This will help you take action before the stress becomes severe.  · When you can, do something about the source of your stress. (Avoid hassles, limit the amount of change that happens in your life at one time and take a break when you feel overloaded).  · Unfortunately, many stressful situations can't be avoided. It is necessary to learn how to better manage stress. There are many proven methods that will reduce your anxiety. These include simple things like exercise, good nutrition and adequate rest. Also, there are certain techniques that are helpful:  ¨ Relaxation  ¨ Breathing exercises  ¨ Visualization  ¨ Biofeedback  ¨ Meditation  For more information about this, consult your doctor or go to a local bookstore and review the many books and tapes available on this subject.  Follow-up care  If you feel that your anxiety is not responding to self-help measures, contact your doctor or make an appointment with a counselor. You may need short-term psychological counseling and temporary medicine to help you manage stress.  Call 911  Call your healthcare provider right away if any of these occur:  · Trouble breathing  · Confusion  · Drowsiness or trouble wakening  · Fainting or loss of consciousness  · Rapid heart rate  · Seizure  · New chest pain that becomes more severe, lasts longer, or spreads into your shoulder, arm, neck, jaw, or back  When to seek medical advice  Call your healthcare provider right away if any of these occur:  · Your symptoms get worse  · Severe headache not relieved by rest and mild pain reliever  Date Last Reviewed: 9/29/2015  © 8386-2343 [employer]. [address] [place] [address], [place] [address]. All rights reserved. This information is not intended as a substitute for professional medical care. Always  follow your healthcare professional's instructions.

## 2019-06-19 NOTE — PATIENT INSTRUCTIONS
If you were prescribed a narcotic or controlled medication, do not drive or operate heavy equipment or machinery while taking these medications.  You must understand that you've received an Urgent Care treatment only and that you may be released before all your medical problems are known or treated. You, the patient, will arrange for follow up care as instructed.  Follow up with your PCP or specialty clinic as directed if not improved or as needed. You can call [phone] to schedule an appointment with the appropriate provider.  If your condition worsens we recommend that you receive another evaluation at the Emergency Department for any concerns or worsening of condition.  Patient aware and verbalized understanding.    Increase fluids and rest is important.  Avoid contact with sick individuals.  Humidifier use at home.  Claritin RX as prescribed daily for seasonal allergies.  Flonase Nasal [place] RX as prescribed for nasal congestion/seasonal allergies.  Vistaril RX as prescribed for anxiety - will make you drowsy!  Zofran RX as prescribed for nausea as needed.  Ibuprofen RX as prescribed for headache as needed.  Follow-up with your PCP in the next 48hrs or sooner for re-evaluation especially if no improvement in symptoms.  Follow-up in the ER for any worsening of symptoms such as shortness of breath, chest pain, wheezing, new fever, increasing ear pain, neck stiffness, dizziness, weakness etc.  Patient aware and verbalized understanding.    Self-Care for Headaches  Most headaches aren't serious and can be relieved with self-care. But some headaches may be a sign of another health problem like eye trouble or high blood pressure. To find the best treatment, learn what kind of headaches you get. For tension headaches, self-care will usually help. To treat migraines, ask your healthcare provider for advice. It is also possible to get both tension and migraine headaches. Self-care involves relieving the pain and  "avoiding headache triggers if you can.    Ways to reduce pain and tension  Try these steps:  · Apply a cold compress or ice pack to the pain site.  · Drink fluids. If nausea makes it hard to drink, try sucking on ice.  · Rest. Protect yourself from bright light and loud noises.  · Calm your emotions by imagining a peaceful scene.  · Massage tight neck, shoulder, and head muscles.  · To relax muscles, soak in a hot bath or use a hot shower.  Use medicines  Aspirin or aspirin substitutes, such as ibuprofen and acetaminophen, can relieve headache. Remember: Never give aspirin to anyone 18 years old or younger because of the risk of developing Reye syndrome. Use pain medicines only when necessary.  Track your headaches  Keeping a headache diary can help you and your healthcare provider identify what's causing your headaches:  · Note when each headache happens.  · Identify your activities and the foods you've eaten 6 to 8 hours before the headache began.  · Look for any trends or "triggers."  Signs of tension headache  Any of the following can be signs:  · Dull pain or feeling of pressure in a tight band around your head  · Pain in your neck or shoulders  · Headache without a definite beginning or end  · Headache after an activity such as driving or working on a computer  Signs of migraine  Any of the following can be signs:  · Throbbing pain on one or both sides of your head  · Nausea or vomiting  · Extreme sensitivity to light, sound, and smells  · Bright spots, flashes, or other visual changes  · Pain or nausea so severe that you can't continue your daily activities  Call your healthcare provider   If you have any of the following symptoms, contact your healthcare provider:  · A headache that lingers after a recent injury or bump to the head.  · A fever with a stiff neck or pain when you bend your head toward your chest.  · A headache along with slurred speech, changes in your vision, or numbness or weakness in your " "arms or legs.  · A headache for longer than 3 days.  · Frequent headaches, especially in the morning.  · Headaches with seizures   · Seek immediate medical attention if you have a headache that you would call "the worst headache you have ever had."   Date Last Reviewed: 10/4/2015  © 2235-5979 [employer]. [address] [place] [address], [place] [address]. All rights reserved. This information is not intended as a substitute for professional medical care. Always follow your healthcare professional's instructions.  Anxiety Reaction  Anxiety is the feeling we all get when we think something bad might happen. It is a normal response to stress and usually causes only a mild reaction. When anxiety becomes more severe, it can interfere with daily life. In some cases, you may not even be aware of what it is youre anxious about. There may also be a genetic link or it may be a learned behavior in the home.  Both psychological and physical triggers cause stress reaction. It's often a response to fear or emotional stress, real or imagined. This stress may come from home, family, work, or social relationships.  During an anxiety reaction, you may feel:  · Helpless  · Nervous  · Depressed  · Irritable  Your body may show signs of anxiety in many ways. You may experience:  · Dry mouth  · Shakiness  · Dizziness  · Weakness  · Trouble breathing  · Breathing fast (hyperventilating)  · Chest pressure  · Sweating  · Headache  · Nausea  · Diarrhea  · Tiredness  · Inability to sleep  · Sexual problems  Home care  · Try to locate the sources of stress in your life. They may not be obvious. These may include:  ¨ Daily hassles of life (traffic jams, missed appointments, car troubles, etc.)  ¨ Major life changes, both good (new baby, job promotion) and bad (loss of job, loss of loved one)  ¨ Overload: feeling that you have too many responsibilities and can't take care of all of them at once  ¨ Feeling helpless, feeling that your " problems are beyond what youre able to solve  · Notice how your body reacts to stress. Learn to listen to your body signals. This will help you take action before the stress becomes severe.  · When you can, do something about the source of your stress. (Avoid hassles, limit the amount of change that happens in your life at one time and take a break when you feel overloaded).  · Unfortunately, many stressful situations can't be avoided. It is necessary to learn how to better manage stress. There are many proven methods that will reduce your anxiety. These include simple things like exercise, good nutrition and adequate rest. Also, there are certain techniques that are helpful:  ¨ Relaxation  ¨ Breathing exercises  ¨ Visualization  ¨ Biofeedback  ¨ Meditation  For more information about this, consult your doctor or go to a local bookstore and review the many books and tapes available on this subject.  Follow-up care  If you feel that your anxiety is not responding to self-help measures, contact your doctor or make an appointment with a counselor. You may need short-term psychological counseling and temporary medicine to help you manage stress.  Call 911  Call your healthcare provider right away if any of these occur:  · Trouble breathing  · Confusion  · Drowsiness or trouble wakening  · Fainting or loss of consciousness  · Rapid heart rate  · Seizure  · New chest pain that becomes more severe, lasts longer, or spreads into your shoulder, arm, neck, jaw, or back  When to seek medical advice  Call your healthcare provider right away if any of these occur:  · Your symptoms get worse  · Severe headache not relieved by rest and mild pain reliever  Date Last Reviewed: 9/29/2015  © 2188-7782 [employer]. [address] [place] [address], [place] [address]. All rights reserved. This information is not intended as a substitute for professional medical care. Always follow your healthcare professional's instructions.

## 2019-06-19 NOTE — LETTER
June 19, 2019      [facility] [address] [place]  [place] [address]  Phone: [phone]  Fax: [phone]       Patient: Dora Hernandez   YOB: 1990  Date of Visit: 06/19/2019    To Whom It May Concern:    Charmaine Hernandez  was at [facility] on 06/19/2019. She may return to work/school on 06/20/2019 with no restrictions. If you have any questions or concerns, or if I can be of further assistance, please do not hesitate to contact me.    Sincerely,        Hermila Noe PA-C

## 2019-07-08 ENCOUNTER — TELEPHONE (OUTPATIENT)
Dept: OBSTETRICS AND GYNECOLOGY | Facility: CLINIC | Age: 29
End: 2019-07-08

## 2019-07-08 DIAGNOSIS — N92.6 MISSED PERIOD: Primary | ICD-10-CM

## 2019-07-08 NOTE — TELEPHONE ENCOUNTER
----- Message from Chantel Gonzalez sent at 7/8/2019  8:11 AM CDT -----  Contact: Pt  Pt is requesting a callback from office need to get a pregnancy confirmation visit scheduled     Pt can be reached at [phone]    Thanks

## 2019-07-22 ENCOUNTER — OFFICE VISIT (OUTPATIENT)
Dept: OBSTETRICS AND GYNECOLOGY | Facility: CLINIC | Age: 29
End: 2019-07-22
Payer: COMMERCIAL

## 2019-07-22 ENCOUNTER — HOSPITAL ENCOUNTER (OUTPATIENT)
Dept: RADIOLOGY | Facility: OTHER | Age: 29
Discharge: HOME | End: 2019-07-22
Attending: OBSTETRICS & GYNECOLOGY
Payer: COMMERCIAL

## 2019-07-22 VITALS
WEIGHT: 227.94 LBS | HEIGHT: 67 IN | DIASTOLIC BLOOD PRESSURE: 72 MMHG | SYSTOLIC BLOOD PRESSURE: 118 MMHG | BODY MASS INDEX: 35.78 KG/M2

## 2019-07-22 DIAGNOSIS — N91.2 AMENORRHEA: Primary | ICD-10-CM

## 2019-07-22 DIAGNOSIS — Z32.01 POSITIVE PREGNANCY TEST: ICD-10-CM

## 2019-07-22 DIAGNOSIS — N92.6 MISSED PERIOD: ICD-10-CM

## 2019-07-22 LAB
B-HCG UR QL: POSITIVE
CTP QC/QA: YES

## 2019-07-22 PROCEDURE — 81025 URINE PREGNANCY TEST: CPT | Mod: S$GLB,,, | Performed by: NURSE PRACTITIONER

## 2019-07-22 PROCEDURE — 76801 US OB LESS THAN 14 WKS WITH TRANSVAGINAL (XPD): ICD-10-PCS | Mod: 26,,, | Performed by: RADIOLOGY

## 2019-07-22 PROCEDURE — 76801 OB US < 14 WKS SINGLE FETUS: CPT | Mod: 26,,, | Performed by: RADIOLOGY

## 2019-07-22 PROCEDURE — 76817 TRANSVAGINAL US OBSTETRIC: CPT | Mod: 26,,, | Performed by: RADIOLOGY

## 2019-07-22 PROCEDURE — 99214 PR OFFICE/OUTPT VISIT, EST, LEVL IV, 30-39 MIN: ICD-10-PCS | Mod: 25,S$GLB,, | Performed by: NURSE PRACTITIONER

## 2019-07-22 PROCEDURE — 87088 URINE BACTERIA CULTURE: CPT

## 2019-07-22 PROCEDURE — 3008F PR BODY MASS INDEX (BMI) DOCUMENTED: ICD-10-PCS | Mod: CPTII,S$GLB,, | Performed by: NURSE PRACTITIONER

## 2019-07-22 PROCEDURE — 99999 PR PBB SHADOW E&M-EST. PATIENT-LVL III: CPT | Mod: PBBFAC,,, | Performed by: NURSE PRACTITIONER

## 2019-07-22 PROCEDURE — 76801 OB US < 14 WKS SINGLE FETUS: CPT | Mod: TC

## 2019-07-22 PROCEDURE — 87086 URINE CULTURE/COLONY COUNT: CPT

## 2019-07-22 PROCEDURE — 76817 US OB LESS THAN 14 WKS WITH TRANSVAGINAL (XPD): ICD-10-PCS | Mod: 26,,, | Performed by: RADIOLOGY

## 2019-07-22 PROCEDURE — 3008F BODY MASS INDEX DOCD: CPT | Mod: CPTII,S$GLB,, | Performed by: NURSE PRACTITIONER

## 2019-07-22 PROCEDURE — 81025 POCT URINE PREGNANCY: ICD-10-PCS | Mod: S$GLB,,, | Performed by: NURSE PRACTITIONER

## 2019-07-22 PROCEDURE — 87491 CHLMYD TRACH DNA AMP PROBE: CPT

## 2019-07-22 PROCEDURE — 99999 PR PBB SHADOW E&M-EST. PATIENT-LVL III: ICD-10-PCS | Mod: PBBFAC,,, | Performed by: NURSE PRACTITIONER

## 2019-07-22 PROCEDURE — 99214 OFFICE O/P EST MOD 30 MIN: CPT | Mod: 25,S$GLB,, | Performed by: NURSE PRACTITIONER

## 2019-07-22 NOTE — PROGRESS NOTES
"  CC: Positive Pregnancy Test    HISTORY OF PRESENT ILLNESS:    Dora Hernandez is a 29 y.o. female, ,  Presents today for a routine exam complaining of amenorrhea and positive home urine pregnancy test.  Patient's last menstrual period was 2019 (exact date).  Doing well, has nausea but tolerable. No vomiting. Mild cramping, probably not drinking enough water. Third pregnancy, prior SAB and  x 1. Son is 3 and delivered vaginally in [place]. Denies complications except polyhydramnios. No hx of asthma, diabetes, or hypertension. She does want to do NT scan. Only taking a prenatal vitamin. Plans to f/u with Dr. Short    Reports nausea. Reports breast tenderness. Denies pelvic pain.    LMP: 20  EGA: 8w1d  EDC: 3/1/20    ROS:  GENERAL: No weight changes. No swelling. + fatigue. No fever.  CARDIOVASCULAR: No chest pain. No shortness of breath. No leg cramps.   NEUROLOGICAL: No headaches. No vision changes.  BREASTS: No pain. No lumps. No discharge.  ABDOMEN: No pain. No diarrhea. No constipation.  REPRODUCTIVE: No abnormal bleeding.   VULVA: No pain. No lesions. No itching.  VAGINA: No relaxation. No itching. No odor. No discharge. No lesions.  URINARY: No incontinence. No nocturia. No frequency. No dysuria.    MEDICATIONS AND ALLERGIES:  Reviewed    COMPREHENSIVE GYN HISTORY:  PAP History: Denies abnormal Paps.  Infection History: Denies STDs. Denies PID.  Benign History: Denies uterine fibroids. Denies ovarian cysts. Denies endometriosis. Denies other conditions.  Cancer History: Denies cervical cancer. Denies uterine cancer or hyperplasia. Denies ovarian cancer. Denies vulvar cancer or pre-cancer. Denies vaginal cancer or pre-cancer. Denies breast cancer. Denies colon cancer.  Sexual Activity History: Reports currently being sexually active  Menstrual History: None.  Contraception: None    /72   Ht 5' 7" (1.702 m)   Wt 103.4 kg (227 lb 15.3 oz)   LMP 2019 (Exact Date)   BMI " 35.70 kg/m²     PE:  AFFECT: Calm, alert and oriented X 3. Interactive during exam  GENERAL: Appears well-nourished, well-developed, in no acute distress.  HEAD: Normocephalic, atruamatic  TEETH: Good dentition.  THYROID: No thyromegally   BREASTS: No masses, skin changes, nipple discharge or adenopathy bilaterally.  SKIN: Normal for race, warm, & dry. No lesions or rashes.  LUNGS: Easy and unlabored, clear to auscultation bilaterally.  HEART: Regular rate and rhythm   ABDOMEN: Soft and nontender without masses or organomegally.  VULVA: No lesions, masses or tenderness.  VAGINA: Moist and well rugated without lesions or discharge.  CERVIX: Moist and pink without lesions, discharge or tenderness.      UTERUS SIZE: 8 week size, nontender and without masses.  ADNEXA: No masses or tenderness.  ESTIMATE OF PELVIC CAPACITY: Adequate  EXTREMITIES: No cyanosis, clubbing or edema. No calf tenderness.  LYMPH NODES: No axillary or inguinal adenopathy.    PROCEDURES:  UPT Positive  Genprobe  Pap current, negative in 2019    ASSESSMENT/PLAN:  Amenorrhea  Positive urine pregnancy test (REBECCA: 3/1/20, EGA: 8w1d based on LMP)    -  Routine prenatal care    Nausea and vomiting in pregnancy    -  Education regarding lifestyle and dietary modifications    -  Advised use of B6/Unisom. Pt will notify us if no relief/worsening symptoms, will consider Zofran if needed.    1st TRIMESTER COUNSELING: Discussed all, booklet provided:  Common complaints of pregnancy  HIV and other routine prenatal tests including  genetic screening  Risk factors identified by prenatal history  Oriented to practice - discussed anticipated course of prenatal care & indications for Ultrasound  Childbirth classes/Hospital facilities   Nutrition and weight gain counseling  Toxoplasmosis precautions (Cats/Raw Meat)  Sexual activity and exercise  Environmental/Work hazards  Travel  Tobacco (Ask, Advise, Assess, Assist, and Arrange), as well as alcohol and drug  use  Use of any medications (Including supplements, Vitamins, Herbs, or OTC Drugs)  Domestic violence  Seat belt use    TERATOLOGY COUNSELING:   Discussed indications and options for aneuploidy screening - pamphlets given    -  Pt desires NT scan and orders placed    FOLLOW-UP in 4 weeks with Dr. Han Jhaveri and u/s today  Pap current  OTC PNV  NT  Needs early GTT    Kristen David, ALEX    OB/GYN

## 2019-07-23 LAB
BACTERIA UR CULT: ABNORMAL
C TRACH DNA SPEC QL NAA+PROBE: NOT DETECTED
N GONORRHOEA DNA SPEC QL NAA+PROBE: NOT DETECTED

## 2019-07-26 ENCOUNTER — HOSPITAL ENCOUNTER (EMERGENCY)
Facility: OTHER | Age: 29
Discharge: HOME | End: 2019-07-26
Attending: EMERGENCY MEDICINE
Payer: COMMERCIAL

## 2019-07-26 ENCOUNTER — PATIENT MESSAGE (OUTPATIENT)
Dept: OBSTETRICS AND GYNECOLOGY | Facility: CLINIC | Age: 29
End: 2019-07-26

## 2019-07-26 VITALS
DIASTOLIC BLOOD PRESSURE: 66 MMHG | WEIGHT: 227 LBS | SYSTOLIC BLOOD PRESSURE: 109 MMHG | TEMPERATURE: 98 F | BODY MASS INDEX: 35.55 KG/M2 | RESPIRATION RATE: 20 BRPM | OXYGEN SATURATION: 100 % | HEART RATE: 72 BPM

## 2019-07-26 DIAGNOSIS — K52.9 FREQUENT STOOLS: ICD-10-CM

## 2019-07-26 DIAGNOSIS — R10.32 LEFT LOWER QUADRANT PAIN: Primary | ICD-10-CM

## 2019-07-26 LAB
ALBUMIN SERPL BCP-MCNC: 3.3 G/DL (ref 3.5–5.2)
ALP SERPL-CCNC: 58 U/L (ref 55–135)
ALT SERPL W/O P-5'-P-CCNC: 14 U/L (ref 10–44)
ANION GAP SERPL CALC-SCNC: 9 MMOL/L (ref 8–16)
AST SERPL-CCNC: 13 U/L (ref 10–40)
BACTERIA #/AREA URNS HPF: ABNORMAL /HPF
BASOPHILS # BLD AUTO: 0.05 K/UL (ref 0–0.2)
BASOPHILS NFR BLD: 0.6 % (ref 0–1.9)
BILIRUB SERPL-MCNC: 0.2 MG/DL (ref 0.1–1)
BILIRUB UR QL STRIP: NEGATIVE
BUN SERPL-MCNC: 6 MG/DL (ref 6–20)
CALCIUM SERPL-MCNC: 9.4 MG/DL (ref 8.7–10.5)
CHLORIDE SERPL-SCNC: 105 MMOL/L (ref 95–110)
CLARITY UR: ABNORMAL
CO2 SERPL-SCNC: 21 MMOL/L (ref 23–29)
COLOR UR: YELLOW
CREAT SERPL-MCNC: 0.7 MG/DL (ref 0.5–1.4)
DIFFERENTIAL METHOD: ABNORMAL
EOSINOPHIL # BLD AUTO: 0.2 K/UL (ref 0–0.5)
EOSINOPHIL NFR BLD: 2.8 % (ref 0–8)
ERYTHROCYTE [DISTWIDTH] IN BLOOD BY AUTOMATED COUNT: 14.5 % (ref 11.5–14.5)
EST. GFR  (AFRICAN AMERICAN): >60 ML/MIN/1.73 M^2
EST. GFR  (NON AFRICAN AMERICAN): >60 ML/MIN/1.73 M^2
GLUCOSE SERPL-MCNC: 87 MG/DL (ref 70–110)
GLUCOSE UR QL STRIP: ABNORMAL
HCG INTACT+B SERPL-ACNC: NORMAL MIU/ML
HCT VFR BLD AUTO: 39.9 % (ref 37–48.5)
HGB BLD-MCNC: 12.7 G/DL (ref 12–16)
HGB UR QL STRIP: NEGATIVE
IMM GRANULOCYTES # BLD AUTO: 0.02 K/UL (ref 0–0.04)
IMM GRANULOCYTES NFR BLD AUTO: 0.3 % (ref 0–0.5)
KETONES UR QL STRIP: NEGATIVE
LEUKOCYTE ESTERASE UR QL STRIP: ABNORMAL
LIPASE SERPL-CCNC: 28 U/L (ref 4–60)
LYMPHOCYTES # BLD AUTO: 2 K/UL (ref 1–4.8)
LYMPHOCYTES NFR BLD: 24.8 % (ref 18–48)
MCH RBC QN AUTO: 27.1 PG (ref 27–31)
MCHC RBC AUTO-ENTMCNC: 31.8 G/DL (ref 32–36)
MCV RBC AUTO: 85 FL (ref 82–98)
MICROSCOPIC COMMENT: ABNORMAL
MONOCYTES # BLD AUTO: 0.7 K/UL (ref 0.3–1)
MONOCYTES NFR BLD: 8.8 % (ref 4–15)
NEUTROPHILS # BLD AUTO: 5 K/UL (ref 1.8–7.7)
NEUTROPHILS NFR BLD: 62.7 % (ref 38–73)
NITRITE UR QL STRIP: NEGATIVE
NRBC BLD-RTO: 0 /100 WBC
PH UR STRIP: 7 [PH] (ref 5–8)
PLATELET # BLD AUTO: 367 K/UL (ref 150–350)
PMV BLD AUTO: 8.8 FL (ref 9.2–12.9)
POTASSIUM SERPL-SCNC: 4.5 MMOL/L (ref 3.5–5.1)
PROT SERPL-MCNC: 7.7 G/DL (ref 6–8.4)
PROT UR QL STRIP: NEGATIVE
RBC # BLD AUTO: 4.69 M/UL (ref 4–5.4)
SODIUM SERPL-SCNC: 135 MMOL/L (ref 136–145)
SP GR UR STRIP: 1.01 (ref 1–1.03)
SQUAMOUS #/AREA URNS HPF: 9 /HPF
URN SPEC COLLECT METH UR: ABNORMAL
UROBILINOGEN UR STRIP-ACNC: NEGATIVE EU/DL
WBC # BLD AUTO: 7.91 K/UL (ref 3.9–12.7)
WBC #/AREA URNS HPF: 6 /HPF (ref 0–5)

## 2019-07-26 PROCEDURE — 84702 CHORIONIC GONADOTROPIN TEST: CPT

## 2019-07-26 PROCEDURE — 96361 HYDRATE IV INFUSION ADD-ON: CPT

## 2019-07-26 PROCEDURE — 63600175 PHARM REV CODE 636 W HCPCS: Performed by: PHYSICIAN ASSISTANT

## 2019-07-26 PROCEDURE — 85025 COMPLETE CBC W/AUTO DIFF WBC: CPT

## 2019-07-26 PROCEDURE — 81000 URINALYSIS NONAUTO W/SCOPE: CPT

## 2019-07-26 PROCEDURE — 83690 ASSAY OF LIPASE: CPT

## 2019-07-26 PROCEDURE — 96360 HYDRATION IV INFUSION INIT: CPT

## 2019-07-26 PROCEDURE — 25000003 PHARM REV CODE 250: Performed by: PHYSICIAN ASSISTANT

## 2019-07-26 PROCEDURE — 99284 EMERGENCY DEPT VISIT MOD MDM: CPT | Mod: 25

## 2019-07-26 PROCEDURE — 80053 COMPREHEN METABOLIC PANEL: CPT

## 2019-07-26 RX ORDER — SIMETHICONE 80 MG
1 TABLET,CHEWABLE ORAL
Status: COMPLETED | OUTPATIENT
Start: 2019-07-26 | End: 2019-07-26

## 2019-07-26 RX ORDER — DICYCLOMINE HYDROCHLORIDE 10 MG/1
20 CAPSULE ORAL
Status: COMPLETED | OUTPATIENT
Start: 2019-07-26 | End: 2019-07-26

## 2019-07-26 RX ORDER — DICYCLOMINE HYDROCHLORIDE 20 MG/1
20 TABLET ORAL 2 TIMES DAILY PRN
Qty: 4 TABLET | Refills: 0 | Status: SHIPPED | OUTPATIENT
Start: 2019-07-26 | End: 2019-08-25

## 2019-07-26 RX ORDER — ACETAMINOPHEN 500 MG
1000 TABLET ORAL
Status: COMPLETED | OUTPATIENT
Start: 2019-07-26 | End: 2019-07-26

## 2019-07-26 RX ADMIN — ACETAMINOPHEN 1000 MG: 500 TABLET ORAL at 04:07

## 2019-07-26 RX ADMIN — SODIUM CHLORIDE 1000 ML: 0.9 INJECTION, SOLUTION INTRAVENOUS at 04:07

## 2019-07-26 RX ADMIN — SIMETHICONE CHEW TAB 80 MG 80 MG: 80 TABLET ORAL at 04:07

## 2019-07-26 RX ADMIN — DICYCLOMINE HYDROCHLORIDE 20 MG: 10 CAPSULE ORAL at 04:07

## 2019-07-26 NOTE — DISCHARGE INSTRUCTIONS
Return if you have worsening pain, develops fever or bloody stools  Your hormone level, beta HCG, was 188,000

## 2019-07-26 NOTE — ED TRIAGE NOTES
Patient came in with complaints of pelvic pain through L side of groin, 8 weeks pregnant, hurts when exerting self. Ultrasound performed in beginning of week. Denies fever or vaginal bleeding. AAox3. VSS. Will continue to monitor.

## 2019-07-26 NOTE — ED PROVIDER NOTES
Encounter Date: 2019       History     Chief Complaint   Patient presents with    Abdominal Pain     She states that she is 8 weeks pregnant and has been having cramping for the last 3 days. She states that when she stands the pain shoots down her left leg. She states that she has also been suffering with lower back pain. She denies any vaginal bleeding.     Patient is a 28 y/o , 8w4d ga, who presents to the emergency department with abdominal pain. Patient reports onset of mild abdominal cramping for the past 2 days.  She states the pain is progressively worsening.  She states the pain is worse to her left lower abdomen and radiates into her left groin intermittently shoots down her left leg.  She states she had experience lower back pain 1 week ago but this has since resolved since she changed sleeping positions.  She reports frequent stools but no episodes of bloody diarrhea.  She denies nausea or emesis. She denies vaginal bleeding or urinary symptoms. Patient also reports feeling short of breath for the past 2 days only with exertion.    The history is provided by the patient.     Review of patient's allergies indicates:   Allergen Reactions    Metformin Nausea And Vomiting     Past Medical History:   Diagnosis Date    Abnormal Pap smear     Anxiety     Depression     Migraine headache     Polycystic disease, ovaries      Past Surgical History:   Procedure Laterality Date    TYMPANOSTOMY       Family History   Problem Relation Age of Onset    Hypertension Father     Stroke Father     Thyroid disease Mother     Lung cancer Maternal Grandmother      Social History     Tobacco Use    Smoking status: Never Smoker    Smokeless tobacco: Never Used   Substance Use Topics    Alcohol use: No     Frequency: Monthly or less     Drinks per session: 1 or 2     Binge frequency: Never    Drug use: No     Review of Systems   Constitutional: Negative for chills and fever.   Respiratory: Positive for  shortness of breath.    Cardiovascular: Negative for chest pain.   Gastrointestinal: Positive for abdominal pain. Negative for constipation, nausea and vomiting.   Genitourinary: Negative for dysuria and frequency.   Musculoskeletal: Negative for arthralgias.   Skin: Negative for rash.   Neurological: Negative for headaches.       Physical Exam     Initial Vitals [07/26/19 1527]   BP Pulse Resp Temp SpO2   127/74 103 20 98.4 °F (36.9 °C) 99 %      MAP       --         Physical Exam    Constitutional: Vital signs are normal. She is Obese . She is cooperative. No distress.   HENT:   Head: Normocephalic and atraumatic.   Eyes: EOM are normal. Pupils are equal, round, and reactive to light.   Neck: Normal range of motion. Neck supple.   Cardiovascular: Normal rate, regular rhythm and intact distal pulses.   Pulmonary/Chest: Breath sounds normal. She has no wheezes. She has no rhonchi. She has no rales.   Abdominal: Soft. Bowel sounds are normal. There is tenderness (Milf, diffuse, worse in left lower quadrant).   Musculoskeletal: Normal range of motion. She exhibits no edema.   Neurological: She is alert and oriented to person, place, and time. GCS eye subscore is 4. GCS verbal subscore is 5. GCS motor subscore is 6.   Skin: Skin is warm and dry. No rash noted.         ED Course   Procedures  Labs Reviewed   URINALYSIS, REFLEX TO URINE CULTURE - Abnormal; Notable for the following components:       Result Value    Appearance, UA Hazy (*)     Glucose, UA 1+ (*)     Leukocytes, UA 3+ (*)     All other components within normal limits    Narrative:     Preferred Collection Type->Urine, Clean Catch   CBC W/ AUTO DIFFERENTIAL - Abnormal; Notable for the following components:    Mean Corpuscular Hemoglobin Conc 31.8 (*)     Platelets 367 (*)     MPV 8.8 (*)     All other components within normal limits   COMPREHENSIVE METABOLIC PANEL - Abnormal; Notable for the following components:    Sodium 135 (*)     CO2 21 (*)     Albumin  3.3 (*)     All other components within normal limits   URINALYSIS MICROSCOPIC - Abnormal; Notable for the following components:    WBC, UA 6 (*)     Bacteria Few (*)     All other components within normal limits    Narrative:     Preferred Collection Type->Urine, Clean Catch   LIPASE   HCG, QUANTITATIVE, PREGNANCY   HCG, QUANTITATIVE, PREGNANCY          Imaging Results    None          Medical Decision Making:   History:   Old Records Summarized: records from clinic visits.       <> Summary of Records: Patient had confirmed IUP 4 days ago.  Initial Assessment:   Urgent evaluation of a 29 y.o. female presenting to the emergency department complaining of generalized abdominal cramping x2 days. Patient is afebrile, nontoxic appearing and hemodynamically stable.  Patient has no signs of acute abdomen.  She does not appear to be in any distress.  She had a confirmed IUP 4 days ago that also revealed a small, 3.5 cm left ovarian cyst.  No vaginal bleeding or pelvic pain to suggest spontaneous .  Will obtain lab work, provide patient with IV fluids and antispasmodic and Tylenol.  ED Management:  CBC, CMP and lipase are unremarkable. Urinalysis reveals 6 WBCs but is contaminated with 9 squamous epithelial cells.  Upon reassessment, patient reports her pain has significantly improved from 7/10 to a 2/10.  Patient likely has viral gastroenteritis.  Have considered diverticulitis, but without fever, bloody stools or white blood cell count will not treat for this at this time.  Patient is advised on symptomatic care and soft diet.  She is given strict return precautions.  She had no other complaints today and was stable at discharge.                      Clinical Impression:     1. Left lower quadrant pain    2. Frequent stools                               Dank Dey PA-C  [phone]

## 2019-08-16 ENCOUNTER — PATIENT MESSAGE (OUTPATIENT)
Dept: OBSTETRICS AND GYNECOLOGY | Facility: CLINIC | Age: 29
End: 2019-08-16

## 2019-08-19 ENCOUNTER — PATIENT MESSAGE (OUTPATIENT)
Dept: OBSTETRICS AND GYNECOLOGY | Facility: CLINIC | Age: 29
End: 2019-08-19

## 2019-08-19 ENCOUNTER — PROCEDURE VISIT (OUTPATIENT)
Dept: MATERNAL FETAL MEDICINE | Facility: CLINIC | Age: 29
End: 2019-08-19
Payer: COMMERCIAL

## 2019-08-19 ENCOUNTER — INITIAL PRENATAL (OUTPATIENT)
Dept: OBSTETRICS AND GYNECOLOGY | Facility: CLINIC | Age: 29
End: 2019-08-19
Payer: COMMERCIAL

## 2019-08-19 ENCOUNTER — LAB VISIT (OUTPATIENT)
Dept: LAB | Facility: OTHER | Age: 29
End: 2019-08-19
Attending: OBSTETRICS & GYNECOLOGY
Payer: COMMERCIAL

## 2019-08-19 VITALS
SYSTOLIC BLOOD PRESSURE: 100 MMHG | WEIGHT: 229.25 LBS | BODY MASS INDEX: 35.91 KG/M2 | DIASTOLIC BLOOD PRESSURE: 70 MMHG

## 2019-08-19 VITALS — HEIGHT: 67 IN | WEIGHT: 231.25 LBS | BODY MASS INDEX: 36.29 KG/M2

## 2019-08-19 DIAGNOSIS — Z34.02 ENCOUNTER FOR SUPERVISION OF NORMAL FIRST PREGNANCY IN SECOND TRIMESTER: Primary | ICD-10-CM

## 2019-08-19 DIAGNOSIS — Z36.82 ENCOUNTER FOR NUCHAL TRANSLUCENCY TESTING: Primary | ICD-10-CM

## 2019-08-19 DIAGNOSIS — N91.2 AMENORRHEA: ICD-10-CM

## 2019-08-19 DIAGNOSIS — Z36.89 ENCOUNTER FOR FETAL ANATOMIC SURVEY: ICD-10-CM

## 2019-08-19 DIAGNOSIS — Z36.82 ENCOUNTER FOR NUCHAL TRANSLUCENCY TESTING: ICD-10-CM

## 2019-08-19 DIAGNOSIS — Z32.01 POSITIVE PREGNANCY TEST: ICD-10-CM

## 2019-08-19 PROCEDURE — 99499 UNLISTED E&M SERVICE: CPT | Mod: S$GLB,,, | Performed by: OBSTETRICS & GYNECOLOGY

## 2019-08-19 PROCEDURE — 99499 NO LOS: ICD-10-PCS | Mod: S$GLB,,, | Performed by: OBSTETRICS & GYNECOLOGY

## 2019-08-19 PROCEDURE — 99999 PR PBB SHADOW E&M-EST. PATIENT-LVL II: ICD-10-PCS | Mod: PBBFAC,,, | Performed by: OBSTETRICS & GYNECOLOGY

## 2019-08-19 PROCEDURE — 76813 OB US NUCHAL MEAS 1 GEST: CPT | Mod: S$GLB,,, | Performed by: OBSTETRICS & GYNECOLOGY

## 2019-08-19 PROCEDURE — 99999 PR PBB SHADOW E&M-EST. PATIENT-LVL II: CPT | Mod: PBBFAC,,, | Performed by: OBSTETRICS & GYNECOLOGY

## 2019-08-19 PROCEDURE — 84163 PAPPA SERUM: CPT

## 2019-08-19 PROCEDURE — 76813 PR US, OB NUCHAL, TRANSABDOM/TRANSVAG, FIRST GESTATION: ICD-10-PCS | Mod: S$GLB,,, | Performed by: OBSTETRICS & GYNECOLOGY

## 2019-08-19 PROCEDURE — 0502F SUBSEQUENT PRENATAL CARE: CPT | Mod: CPTII,S$GLB,, | Performed by: OBSTETRICS & GYNECOLOGY

## 2019-08-19 PROCEDURE — 0502F PR SUBSEQUENT PRENATAL CARE: ICD-10-PCS | Mod: CPTII,S$GLB,, | Performed by: OBSTETRICS & GYNECOLOGY

## 2019-08-19 PROCEDURE — 36415 COLL VENOUS BLD VENIPUNCTURE: CPT

## 2019-08-22 LAB — INTEGRATED SCN PATIENT-IMP NAR: NORMAL

## 2019-08-26 NOTE — PROGRESS NOTES
Patient reports no abdominal pain & no vaginal bleeding. Labs reviewed in detail with patient and questions answered. Overall doing well.  Discussed future OB appointments, genetic testing, ultrasounds and prenatal classes.   Abdominal pain & vaginal bleeding precautions discussed in detail.

## 2019-09-02 ENCOUNTER — PATIENT MESSAGE (OUTPATIENT)
Dept: OBSTETRICS AND GYNECOLOGY | Facility: CLINIC | Age: 29
End: 2019-09-02

## 2019-09-03 ENCOUNTER — TELEPHONE (OUTPATIENT)
Dept: OBSTETRICS AND GYNECOLOGY | Facility: CLINIC | Age: 29
End: 2019-09-03

## 2019-09-03 DIAGNOSIS — O26.892 PREGNANCY HEADACHE IN SECOND TRIMESTER: Primary | ICD-10-CM

## 2019-09-03 DIAGNOSIS — R51.9 PREGNANCY HEADACHE IN SECOND TRIMESTER: Primary | ICD-10-CM

## 2019-09-03 RX ORDER — BUTALBITAL, ACETAMINOPHEN AND CAFFEINE 50; 325; 40 MG/1; MG/1; MG/1
1 TABLET ORAL EVERY 6 HOURS PRN
Qty: 30 TABLET | Refills: 0 | Status: SHIPPED | OUTPATIENT
Start: 2019-09-03 | End: 2019-10-03

## 2019-09-03 NOTE — TELEPHONE ENCOUNTER
Dr Short spoke to the pt and she states that she had headaches in the past and she states that she stayed in the bed all weekend long . She drinks 8 to 10 bottles and in the sun she still gets headaches. Pt was informed that she is to early for pre eclampsia due to her having a hx of headaches, she will send over a rx for the pt some fioricet and she ws informed not to take that and tylenol at the same time she needs to alternate between the fioricett and tylenol. Pt  Was given the precautions increase water in take less time in the sun and more fioricet than tylenol. Pt was advised to call the office back on Thursday to let us know how she is feeling after the change in medicine.

## 2019-09-03 NOTE — TELEPHONE ENCOUNTER
----- Message from Vanda Paz sent at 9/3/2019 12:21 PM CDT -----  Contact: BILL VICTORIA [[id number]]    Name of Who is Calling: BILL VICTORIA [[id number]]       What is the request in detail: Patient is requesting a call from staff she would not go in to detail what the message is  .....Please contact to further discuss and advise.     Can the clinic reply by [facility]: no     What Number to Call Back if not in [facility]:  [phone]

## 2019-09-06 ENCOUNTER — TELEPHONE (OUTPATIENT)
Dept: OBSTETRICS AND GYNECOLOGY | Facility: CLINIC | Age: 29
End: 2019-09-06

## 2019-09-06 NOTE — TELEPHONE ENCOUNTER
I spoke to the pt and informed her per Dr Short  that she will need to call her pcp to get an apt for an evaluation of the headaches that she is still having after being prescribed Ferciot. Pt may need to  possibly get a referral  for a neurology apt.

## 2019-09-10 ENCOUNTER — TELEPHONE (OUTPATIENT)
Dept: MATERNAL FETAL MEDICINE | Facility: CLINIC | Age: 29
End: 2019-09-10

## 2019-09-10 NOTE — TELEPHONE ENCOUNTER
This is the first attempt to reach [facility] about scheduling an anatomy scan. A message was left for the patient to call the office back.

## 2019-09-16 ENCOUNTER — OFFICE VISIT (OUTPATIENT)
Dept: PRIMARY CARE CLINIC | Facility: CLINIC | Age: 29
End: 2019-09-16
Payer: COMMERCIAL

## 2019-09-16 ENCOUNTER — ROUTINE PRENATAL (OUTPATIENT)
Dept: OBSTETRICS AND GYNECOLOGY | Facility: CLINIC | Age: 29
End: 2019-09-16
Payer: COMMERCIAL

## 2019-09-16 VITALS
DIASTOLIC BLOOD PRESSURE: 80 MMHG | HEIGHT: 67 IN | TEMPERATURE: 98 F | WEIGHT: 232.13 LBS | OXYGEN SATURATION: 98 % | SYSTOLIC BLOOD PRESSURE: 102 MMHG | HEART RATE: 92 BPM | BODY MASS INDEX: 36.43 KG/M2

## 2019-09-16 VITALS
BODY MASS INDEX: 35.91 KG/M2 | DIASTOLIC BLOOD PRESSURE: 70 MMHG | SYSTOLIC BLOOD PRESSURE: 100 MMHG | WEIGHT: 229.25 LBS

## 2019-09-16 DIAGNOSIS — O99.342 DEPRESSION DURING PREGNANCY IN SECOND TRIMESTER: Primary | ICD-10-CM

## 2019-09-16 DIAGNOSIS — R68.83 CHILLS: Primary | ICD-10-CM

## 2019-09-16 DIAGNOSIS — Z34.02 ENCOUNTER FOR SUPERVISION OF NORMAL FIRST PREGNANCY IN SECOND TRIMESTER: ICD-10-CM

## 2019-09-16 DIAGNOSIS — J30.89 ENVIRONMENTAL AND SEASONAL ALLERGIES: ICD-10-CM

## 2019-09-16 DIAGNOSIS — F32.A DEPRESSION DURING PREGNANCY IN SECOND TRIMESTER: Primary | ICD-10-CM

## 2019-09-16 LAB
CTP QC/QA: YES
POC MOLECULAR INFLUENZA A AGN: NEGATIVE
POC MOLECULAR INFLUENZA B AGN: NEGATIVE

## 2019-09-16 PROCEDURE — 87502 POCT INFLUENZA A/B MOLECULAR: ICD-10-PCS | Mod: QW,S$GLB,, | Performed by: NURSE PRACTITIONER

## 2019-09-16 PROCEDURE — 99999 PR PBB SHADOW E&M-EST. PATIENT-LVL III: ICD-10-PCS | Mod: PBBFAC,,, | Performed by: NURSE PRACTITIONER

## 2019-09-16 PROCEDURE — 99999 PR PBB SHADOW E&M-EST. PATIENT-LVL III: ICD-10-PCS | Mod: PBBFAC,,, | Performed by: OBSTETRICS & GYNECOLOGY

## 2019-09-16 PROCEDURE — 99214 OFFICE O/P EST MOD 30 MIN: CPT | Mod: 25,S$GLB,, | Performed by: NURSE PRACTITIONER

## 2019-09-16 PROCEDURE — 99999 PR PBB SHADOW E&M-EST. PATIENT-LVL III: CPT | Mod: PBBFAC,,, | Performed by: NURSE PRACTITIONER

## 2019-09-16 PROCEDURE — 3008F PR BODY MASS INDEX (BMI) DOCUMENTED: ICD-10-PCS | Mod: CPTII,S$GLB,, | Performed by: NURSE PRACTITIONER

## 2019-09-16 PROCEDURE — 0502F PR SUBSEQUENT PRENATAL CARE: ICD-10-PCS | Mod: CPTII,S$GLB,, | Performed by: OBSTETRICS & GYNECOLOGY

## 2019-09-16 PROCEDURE — 3008F BODY MASS INDEX DOCD: CPT | Mod: CPTII,S$GLB,, | Performed by: NURSE PRACTITIONER

## 2019-09-16 PROCEDURE — 0502F SUBSEQUENT PRENATAL CARE: CPT | Mod: CPTII,S$GLB,, | Performed by: OBSTETRICS & GYNECOLOGY

## 2019-09-16 PROCEDURE — 87502 INFLUENZA DNA AMP PROBE: CPT | Mod: QW,S$GLB,, | Performed by: NURSE PRACTITIONER

## 2019-09-16 PROCEDURE — 99999 PR PBB SHADOW E&M-EST. PATIENT-LVL III: CPT | Mod: PBBFAC,,, | Performed by: OBSTETRICS & GYNECOLOGY

## 2019-09-16 PROCEDURE — 99214 PR OFFICE/OUTPT VISIT, EST, LEVL IV, 30-39 MIN: ICD-10-PCS | Mod: 25,S$GLB,, | Performed by: NURSE PRACTITIONER

## 2019-09-16 RX ORDER — FLUTICASONE PROPIONATE 50 MCG
1 SPRAY, SUSPENSION (ML) NASAL DAILY
Qty: 16 G | Refills: 0 | Status: ON HOLD | OUTPATIENT
Start: 2019-09-16 | End: 2020-02-26 | Stop reason: HOSPADM

## 2019-09-16 RX ORDER — CETIRIZINE HYDROCHLORIDE 10 MG/1
10 TABLET ORAL DAILY
Refills: 0 | Status: ON HOLD | COMMUNITY
Start: 2019-09-16 | End: 2020-02-26 | Stop reason: HOSPADM

## 2019-09-16 NOTE — PROGRESS NOTES
Subjective:       Patient ID: Dora Hernandez is a 29 y.o. female.    Chief Complaint: Cough; Emesis; Chills; and Generalized Body Aches    Ms. Hernandez presents today for cough and congestion x 2 weeks. The cough can be so severe that it cases her to vomit. She has also had sinus pressure and mild ear pain. She has been using local honey and war salt water gargles.     Review of Systems   Constitutional: Positive for fatigue. Negative for fever.   HENT: Negative for facial swelling.    Eyes: Negative for visual disturbance.   Respiratory: Positive for cough. Negative for shortness of breath.    Cardiovascular: Negative for chest pain.   Gastrointestinal: Positive for vomiting. Negative for nausea.   Genitourinary: Negative for dysuria.   Musculoskeletal: Negative for gait problem.   Skin: Negative for rash.   Neurological: Positive for headaches.   Psychiatric/Behavioral: Negative for confusion.       Objective:      Physical Exam   Constitutional: She is oriented to person, place, and time. She appears well-developed. No distress.   obese   HENT:   Head: Normocephalic.   Right Ear: Ear canal normal. Tympanic membrane is scarred. A middle ear effusion is present.   Left Ear: Ear canal normal. Tympanic membrane is scarred.  No middle ear effusion.   Nose: Mucosal edema and rhinorrhea present. No sinus tenderness. Right sinus exhibits no maxillary sinus tenderness and no frontal sinus tenderness. Left sinus exhibits no maxillary sinus tenderness and no frontal sinus tenderness.   Mouth/Throat: Oropharyngeal exudate and posterior oropharyngeal erythema present.   Cardiovascular: Normal rate, regular rhythm and normal heart sounds.   Pulmonary/Chest: Effort normal and breath sounds normal. No stridor. No respiratory distress. She has no wheezes. She has no rales.   Neurological: She is alert and oriented to person, place, and time.   Skin: She is not diaphoretic.   Psychiatric: She has a normal mood and  affect. Her behavior is normal.   Nursing note and vitals reviewed.      Assessment:       1. Chills    2. Environmental and seasonal allergies        Plan:   1. Chills  - POCT Influenza A/B Molecular    2. Environmental and seasonal allergies  - fluticasone propionate (FLONASE) 50 mcg/actuation nasal spray; 1 spray (50 mcg total) by Each Nostril route once daily.  Dispense: 16 g; Refill: 0  - cetirizine (ZYRTEC) 10 MG tablet; Take 1 tablet (10 mg total) by mouth once daily.; Refill: 0      Pt has been given instructions populated from [employer] database and has verbalized understanding of the after visit summary and information contained wherein.    Follow up with a primary care provider. May go to ER for acute shortness of breath, lightheadedness, fever, or any other emergent complaints or changes in condition.

## 2019-09-18 ENCOUNTER — PATIENT MESSAGE (OUTPATIENT)
Dept: PRIMARY CARE CLINIC | Facility: CLINIC | Age: 29
End: 2019-09-18

## 2019-09-22 NOTE — PROGRESS NOTES
Patient with URI, urgent care appointment scheduled.   Patient reports no abdominal pain & no vaginal bleeding. Labs reviewed in detail with patient and questions answered. Overall doing well.  Abdominal pain & vaginal bleeding precautions discussed in detail.

## 2019-10-10 ENCOUNTER — PATIENT MESSAGE (OUTPATIENT)
Dept: OBSTETRICS AND GYNECOLOGY | Facility: CLINIC | Age: 29
End: 2019-10-10

## 2019-10-11 ENCOUNTER — HOSPITAL ENCOUNTER (EMERGENCY)
Facility: OTHER | Age: 29
Discharge: HOME | End: 2019-10-11
Attending: OBSTETRICS & GYNECOLOGY
Payer: COMMERCIAL

## 2019-10-11 VITALS
TEMPERATURE: 98 F | DIASTOLIC BLOOD PRESSURE: 60 MMHG | HEART RATE: 91 BPM | OXYGEN SATURATION: 99 % | RESPIRATION RATE: 16 BRPM | SYSTOLIC BLOOD PRESSURE: 114 MMHG

## 2019-10-11 DIAGNOSIS — R10.9 ABDOMINAL PAIN, UNSPECIFIED ABDOMINAL LOCATION: ICD-10-CM

## 2019-10-11 DIAGNOSIS — O26.899 PAIN OF ROUND LIGAMENT AFFECTING PREGNANCY, ANTEPARTUM: ICD-10-CM

## 2019-10-11 DIAGNOSIS — R10.2 PAIN OF ROUND LIGAMENT AFFECTING PREGNANCY, ANTEPARTUM: ICD-10-CM

## 2019-10-11 DIAGNOSIS — Z3A.19 19 WEEKS GESTATION OF PREGNANCY: Primary | ICD-10-CM

## 2019-10-11 DIAGNOSIS — O23.42 URINARY TRACT INFECTION AFFECTING CARE OF MOTHER IN SECOND TRIMESTER, ANTEPARTUM: ICD-10-CM

## 2019-10-11 LAB
BACTERIA #/AREA URNS HPF: ABNORMAL /HPF
BILIRUB SERPL-MCNC: NORMAL MG/DL
BILIRUB UR QL STRIP: NEGATIVE
BLOOD URINE, POC: NORMAL
CLARITY UR: ABNORMAL
COLOR UR: YELLOW
COLOR, POC UA: NORMAL
GLUCOSE UR QL STRIP: NEGATIVE
GLUCOSE UR QL STRIP: NORMAL
HGB UR QL STRIP: NEGATIVE
KETONES UR QL STRIP: NEGATIVE
KETONES UR QL STRIP: NORMAL
LEUKOCYTE ESTERASE UR QL STRIP: ABNORMAL
LEUKOCYTE ESTERASE URINE, POC: NORMAL
MICROSCOPIC COMMENT: ABNORMAL
NITRITE UR QL STRIP: NEGATIVE
NITRITE, POC UA: NORMAL
PH UR STRIP: 7 [PH] (ref 5–8)
PH, POC UA: 7
PROT UR QL STRIP: NEGATIVE
PROTEIN, POC: NORMAL
SP GR UR STRIP: 1.01 (ref 1–1.03)
SPECIFIC GRAVITY, POC UA: 1.01
SQUAMOUS #/AREA URNS HPF: 15 /HPF
URN SPEC COLLECT METH UR: ABNORMAL
UROBILINOGEN UR STRIP-ACNC: NEGATIVE EU/DL
UROBILINOGEN, POC UA: NORMAL
WBC #/AREA URNS HPF: 7 /HPF (ref 0–5)

## 2019-10-11 PROCEDURE — 99284 PR EMERGENCY DEPT VISIT,LEVEL IV: ICD-10-PCS | Mod: ,,, | Performed by: OBSTETRICS & GYNECOLOGY

## 2019-10-11 PROCEDURE — 99283 EMERGENCY DEPT VISIT LOW MDM: CPT | Mod: 25

## 2019-10-11 PROCEDURE — 99284 EMERGENCY DEPT VISIT MOD MDM: CPT | Mod: ,,, | Performed by: OBSTETRICS & GYNECOLOGY

## 2019-10-11 PROCEDURE — 81000 URINALYSIS NONAUTO W/SCOPE: CPT

## 2019-10-11 PROCEDURE — 81002 URINALYSIS NONAUTO W/O SCOPE: CPT

## 2019-10-11 RX ORDER — NITROFURANTOIN 25; 75 MG/1; MG/1
100 CAPSULE ORAL 2 TIMES DAILY
Qty: 14 CAPSULE | Refills: 0 | Status: SHIPPED | OUTPATIENT
Start: 2019-10-11 | End: 2019-10-18

## 2019-10-11 NOTE — ED NOTES
Pt discharged from [facility] per MD order. Discharge instructions reviewed with pt. Pt verbalized understanding. Pt left with spouse.

## 2019-10-11 NOTE — DISCHARGE INSTRUCTIONS
Call clinic [phone] or L & D after hours at [phone] for vaginal bleeding, leakage of fluids, contractions 4-5 in one hour, decreased fetal movements ( 10 kicks in 2 hours), headache not relieved by Tylenol, blurry vision, or temp of 100.4 or greater.  Begin doing fetal kick counts, at least 10 movements in 2 hours starting at 28 weeks gestation.  Keep next clinic appointment

## 2019-10-11 NOTE — ED PROVIDER NOTES
Encounter Date: 10/11/2019       History     Chief Complaint   Patient presents with    Abdominal Pain     Dora Hernandez is a 29 y.o. [id number] at 19w4d presents complaining of abdominal pain. She says this abdominal pain started yesterday afternoon. It feels like a sore, shooting pain in her RLQ that is not present when resting but is present when moving around - especially from a sitting to standing position. This IUP is complicated by hx of 2 miscarriages.  Patient denies contractions, denies vaginal bleeding, denies LOF.   Fetal Movement: decreased.    She denies nausea/vomiting. She denies dysuria, increased frequency, or back pain. She denies constipation or diarrhea. No other symptoms - per patient. She does not currently wear a support belt.           Review of patient's allergies indicates:   Allergen Reactions    Metformin Nausea And Vomiting     Past Medical History:   Diagnosis Date    Abnormal Pap smear     Anxiety     Depression     Migraine headache     Polycystic disease, ovaries      Past Surgical History:   Procedure Laterality Date    TYMPANOSTOMY       Family History   Problem Relation Age of Onset    Hypertension Father     Stroke Father     Thyroid disease Mother     Lung cancer Maternal Grandmother      Social History     Tobacco Use    Smoking status: Never Smoker    Smokeless tobacco: Never Used   Substance Use Topics    Alcohol use: No     Frequency: Monthly or less     Drinks per session: 1 or 2     Binge frequency: Never    Drug use: No     Review of Systems   Constitutional: Negative for chills and fever.   HENT: Negative for facial swelling.    Eyes: Negative for visual disturbance.   Respiratory: Negative for chest tightness and shortness of breath.    Cardiovascular: Negative for chest pain.   Gastrointestinal: Positive for abdominal pain (LLQ). Negative for nausea and vomiting.   Genitourinary: Negative for dysuria, flank pain, vaginal bleeding and vaginal  discharge.   Musculoskeletal: Negative for back pain.   Skin: Negative.  Negative for rash.   Neurological: Negative for headaches.   Psychiatric/Behavioral: Negative.        Physical Exam     Initial Vitals [10/11/19 1510]   BP Pulse Resp Temp SpO2   106/65 90 16 98.1 °F (36.7 °C) 98 %      MAP       --         Physical Exam    Vitals reviewed.  Constitutional: She appears well-developed and well-nourished.   HENT:   Head: Normocephalic and atraumatic.   Eyes: EOM are normal.   Neck: Neck supple.   Cardiovascular: Normal rate.   Pulmonary/Chest: No respiratory distress.   Abdominal: Soft. She exhibits no distension. There is tenderness (mild tenderness to deep palpation of LLQ). There is no rebound and no guarding.   Genitourinary: Uterus normal.   Musculoskeletal: Normal range of motion. She exhibits no edema or tenderness.   Neurological: She is alert and oriented to person, place, and time.   Skin: Skin is warm and dry.   Psychiatric: She has a normal mood and affect.         ED Course   Procedures  Labs Reviewed   URINALYSIS, REFLEX TO URINE CULTURE   POCT URINALYSIS, DIPSTICK OR TABLET REAGENT, AUTOMATED, WITH MICROSCOP          Imaging Results    None          Medical Decision Making:   ED Management:  Heart tones confirmed with Doppler  Benign abdominal exam  Urine dip shows 2+ WBCs. Will send for U/A & reflex to culture   Will discharge home with Macrobid now  Counseled patient on round ligament pain and suggestion of wearing a maternity belt.   Other:   I have discussed this case with another health care provider.       <> Summary of the Discussion: Dr. Zepeda              Attending Attestation:   Physician Attestation Statement for Resident:  As the supervising MD   Physician Attestation Statement: I have personally seen and examined this patient.   I agree with the above history. -:   As the supervising MD I agree with the above PE.    As the supervising MD I agree with the above treatment, course,  plan, and disposition.   -: Patient evaluated and found to be stable, agree with resident's assessment and plan.  I was personally present during the critical portions of the procedure(s) performed by the resident and was immediately available in the ED to provide services and assistance as needed during the entire procedure.  I have reviewed and agree with the residents interpretation of the following: lab data.                       Clinical Impression:       ICD-10-CM ICD-9-CM   1. 19 weeks gestation of pregnancy Z3A.19 V22.2   2. Abdominal pain, unspecified abdominal location R10.9 789.00   3. Urinary tract infection affecting care of mother in second trimester, antepartum O23.42 646.63     599.0   4. Pain of round ligament affecting pregnancy, antepartum O26.899 646.83    R10.2 625.9         Disposition:   Disposition: Discharged  Condition: Stable       Maryanne Palma M.D.  OBGYN PGY1                   Maryanne Palma MD  Resident  10/11/19 1604       Ashley Zepeda MD  10/13/19 0750

## 2019-10-14 ENCOUNTER — ROUTINE PRENATAL (OUTPATIENT)
Dept: OBSTETRICS AND GYNECOLOGY | Facility: CLINIC | Age: 29
End: 2019-10-14
Payer: COMMERCIAL

## 2019-10-14 VITALS — BODY MASS INDEX: 36.6 KG/M2 | WEIGHT: 233.69 LBS | DIASTOLIC BLOOD PRESSURE: 80 MMHG | SYSTOLIC BLOOD PRESSURE: 130 MMHG

## 2019-10-14 DIAGNOSIS — O99.342 DEPRESSION DURING PREGNANCY IN SECOND TRIMESTER: ICD-10-CM

## 2019-10-14 DIAGNOSIS — F41.1 GAD (GENERALIZED ANXIETY DISORDER): ICD-10-CM

## 2019-10-14 DIAGNOSIS — Z34.82 ENCOUNTER FOR SUPERVISION OF OTHER NORMAL PREGNANCY IN SECOND TRIMESTER: Primary | ICD-10-CM

## 2019-10-14 DIAGNOSIS — F32.A DEPRESSION DURING PREGNANCY IN SECOND TRIMESTER: ICD-10-CM

## 2019-10-14 PROCEDURE — 0502F PR SUBSEQUENT PRENATAL CARE: ICD-10-PCS | Mod: CPTII,S$GLB,, | Performed by: OBSTETRICS & GYNECOLOGY

## 2019-10-14 PROCEDURE — 99999 PR PBB SHADOW E&M-EST. PATIENT-LVL II: ICD-10-PCS | Mod: PBBFAC,,, | Performed by: OBSTETRICS & GYNECOLOGY

## 2019-10-14 PROCEDURE — 99999 PR PBB SHADOW E&M-EST. PATIENT-LVL II: CPT | Mod: PBBFAC,,, | Performed by: OBSTETRICS & GYNECOLOGY

## 2019-10-14 PROCEDURE — 0502F SUBSEQUENT PRENATAL CARE: CPT | Mod: CPTII,S$GLB,, | Performed by: OBSTETRICS & GYNECOLOGY

## 2019-10-14 NOTE — PATIENT INSTRUCTIONS
Dora,       We are always striving for excellence. Should you receive a patient experience survey in the mail, we would appreciate if you would take a few moments to give us your feedback. These surveys let us know our strengths as well as areas of opportunity for improvement to better serve you.       Thank you for your time,   Ebony Gallagher MA       Your test results will be communicated to you via : [facility], Telephone or Letter.   If you have not received your test results in one week, please contact the clinic at [phone]

## 2019-10-16 ENCOUNTER — PROCEDURE VISIT (OUTPATIENT)
Dept: MATERNAL FETAL MEDICINE | Facility: CLINIC | Age: 29
End: 2019-10-16
Payer: COMMERCIAL

## 2019-10-16 ENCOUNTER — LAB VISIT (OUTPATIENT)
Dept: LAB | Facility: OTHER | Age: 29
End: 2019-10-16
Attending: OBSTETRICS & GYNECOLOGY
Payer: COMMERCIAL

## 2019-10-16 DIAGNOSIS — Z36.9 ENCOUNTER FOR ANTENATAL SCREENING: ICD-10-CM

## 2019-10-16 DIAGNOSIS — Z36.9 ENCOUNTER FOR ANTENATAL SCREENING: Primary | ICD-10-CM

## 2019-10-16 DIAGNOSIS — Z36.89 ENCOUNTER FOR FETAL ANATOMIC SURVEY: ICD-10-CM

## 2019-10-16 DIAGNOSIS — O99.210 MATERNAL OBESITY AFFECTING PREGNANCY, ANTEPARTUM: ICD-10-CM

## 2019-10-16 PROCEDURE — 76811 OB US DETAILED SNGL FETUS: CPT | Mod: S$GLB,,, | Performed by: OBSTETRICS & GYNECOLOGY

## 2019-10-16 PROCEDURE — 36415 COLL VENOUS BLD VENIPUNCTURE: CPT

## 2019-10-16 PROCEDURE — 76811 PR US, OB FETAL EVAL & EXAM, TRANSABDOM,FIRST GESTATION: ICD-10-PCS | Mod: S$GLB,,, | Performed by: OBSTETRICS & GYNECOLOGY

## 2019-10-18 LAB
# FETUSES US: NORMAL
AFP MOM SERPL: 0.86
AFP SERPL-MCNC: 46.1 NG/ML
AGE AT DELIVERY: 31
B-HCG MOM SERPL: 1.07
B-HCG SERPL-ACNC: 20.6 IU/ML
COLLECT DATE BLD: NORMAL
COLLECT DATE: NORMAL
FET TS 21 RISK FROM MAT AGE: NORMAL
GA (DAYS): 0 D
GA (WEEKS): 12 WK
GA METHOD: NORMAL
GEST. AGE (DAYS) 2ND SAMPLE (SI2): 2
GEST. AGE (WKS) 2ND SAMPLE (SI2): 20
IDDM PATIENT QL: NORMAL
INHIBIN A MOM SERPL: 0.98
INHIBIN A SERPL-MCNC: 143.6 PG/ML
INTEGRATED SCN PATIENT-IMP NAR: NORMAL
INTEGRATED SCN PATIENT-IMP: NEGATIVE
PAPP-A MOM SERPL: 3.18
PAPP-A SERPL-MCNC: NORMAL NG/ML
SMOKING STATUS FTND: NO
TS 18 RISK FETUS: NORMAL
TS 21 RISK FETUS: NORMAL
U ESTRIOL MOM SERPL: 1.21
U ESTRIOL SERPL-MCNC: 2.25 NG/ML

## 2019-10-21 NOTE — PROGRESS NOTES
Patient reports good fm, no vaginal bleeding, contractions or rupture of membranes. Overall doing well. Labs reviewed. Questions answered today. Encouraged prenatal classes and lactation visits. Labor, ROM and VB precautions given.  Recent visits to ED reviewed in detail

## 2019-10-22 ENCOUNTER — PATIENT MESSAGE (OUTPATIENT)
Dept: OBSTETRICS AND GYNECOLOGY | Facility: CLINIC | Age: 29
End: 2019-10-22

## 2019-10-27 ENCOUNTER — HOSPITAL ENCOUNTER (EMERGENCY)
Facility: OTHER | Age: 29
Discharge: HOME | End: 2019-10-27
Attending: OBSTETRICS & GYNECOLOGY
Payer: COMMERCIAL

## 2019-10-27 VITALS
SYSTOLIC BLOOD PRESSURE: 114 MMHG | OXYGEN SATURATION: 99 % | HEART RATE: 82 BPM | TEMPERATURE: 98 F | RESPIRATION RATE: 19 BRPM | DIASTOLIC BLOOD PRESSURE: 69 MMHG

## 2019-10-27 DIAGNOSIS — R10.11 RIGHT UPPER QUADRANT ABDOMINAL PAIN: ICD-10-CM

## 2019-10-27 DIAGNOSIS — R19.7 DIARRHEA, UNSPECIFIED TYPE: ICD-10-CM

## 2019-10-27 DIAGNOSIS — Z3A.21 21 WEEKS GESTATION OF PREGNANCY: Primary | ICD-10-CM

## 2019-10-27 DIAGNOSIS — O23.40 URINARY TRACT INFECTION AFFECTING PREGNANCY: ICD-10-CM

## 2019-10-27 LAB
ALBUMIN SERPL BCP-MCNC: 2.5 G/DL (ref 3.5–5.2)
ALP SERPL-CCNC: 71 U/L (ref 55–135)
ALT SERPL W/O P-5'-P-CCNC: 7 U/L (ref 10–44)
ANION GAP SERPL CALC-SCNC: 7 MMOL/L (ref 8–16)
AST SERPL-CCNC: 8 U/L (ref 10–40)
BACTERIA #/AREA URNS HPF: ABNORMAL /HPF
BASOPHILS # BLD AUTO: 0.03 K/UL (ref 0–0.2)
BASOPHILS NFR BLD: 0.6 % (ref 0–1.9)
BILIRUB SERPL-MCNC: 0.2 MG/DL (ref 0.1–1)
BILIRUB SERPL-MCNC: NEGATIVE MG/DL
BILIRUB UR QL STRIP: NEGATIVE
BLOOD URINE, POC: NEGATIVE
BUN SERPL-MCNC: 3 MG/DL (ref 6–20)
CALCIUM SERPL-MCNC: 8.8 MG/DL (ref 8.7–10.5)
CHLORIDE SERPL-SCNC: 107 MMOL/L (ref 95–110)
CLARITY UR: ABNORMAL
CO2 SERPL-SCNC: 22 MMOL/L (ref 23–29)
COLOR UR: YELLOW
COLOR, POC UA: NORMAL
CREAT SERPL-MCNC: 0.6 MG/DL (ref 0.5–1.4)
DIFFERENTIAL METHOD: ABNORMAL
EOSINOPHIL # BLD AUTO: 0.2 K/UL (ref 0–0.5)
EOSINOPHIL NFR BLD: 3 % (ref 0–8)
ERYTHROCYTE [DISTWIDTH] IN BLOOD BY AUTOMATED COUNT: 13.7 % (ref 11.5–14.5)
EST. GFR  (AFRICAN AMERICAN): >60 ML/MIN/1.73 M^2
EST. GFR  (NON AFRICAN AMERICAN): >60 ML/MIN/1.73 M^2
GLUCOSE SERPL-MCNC: 83 MG/DL (ref 70–110)
GLUCOSE UR QL STRIP: NEGATIVE
GLUCOSE UR QL STRIP: NEGATIVE
HCT VFR BLD AUTO: 37.1 % (ref 37–48.5)
HGB BLD-MCNC: 11.8 G/DL (ref 12–16)
HGB UR QL STRIP: ABNORMAL
IMM GRANULOCYTES # BLD AUTO: 0.01 K/UL (ref 0–0.04)
IMM GRANULOCYTES NFR BLD AUTO: 0.2 % (ref 0–0.5)
KETONES UR QL STRIP: NEGATIVE
KETONES UR QL STRIP: NEGATIVE
LEUKOCYTE ESTERASE UR QL STRIP: ABNORMAL
LEUKOCYTE ESTERASE URINE, POC: NORMAL
LYMPHOCYTES # BLD AUTO: 1 K/UL (ref 1–4.8)
LYMPHOCYTES NFR BLD: 19 % (ref 18–48)
MCH RBC QN AUTO: 27.1 PG (ref 27–31)
MCHC RBC AUTO-ENTMCNC: 31.8 G/DL (ref 32–36)
MCV RBC AUTO: 85 FL (ref 82–98)
MICROSCOPIC COMMENT: ABNORMAL
MONOCYTES # BLD AUTO: 0.3 K/UL (ref 0.3–1)
MONOCYTES NFR BLD: 6.1 % (ref 4–15)
NEUTROPHILS # BLD AUTO: 3.9 K/UL (ref 1.8–7.7)
NEUTROPHILS NFR BLD: 71.1 % (ref 38–73)
NITRITE UR QL STRIP: NEGATIVE
NITRITE, POC UA: NEGATIVE
NRBC BLD-RTO: 0 /100 WBC
PH UR STRIP: 7 [PH] (ref 5–8)
PH, POC UA: NORMAL
PLATELET # BLD AUTO: 356 K/UL (ref 150–350)
PMV BLD AUTO: 9.8 FL (ref 9.2–12.9)
POTASSIUM SERPL-SCNC: 4 MMOL/L (ref 3.5–5.1)
PROT SERPL-MCNC: 6.5 G/DL (ref 6–8.4)
PROT UR QL STRIP: NEGATIVE
PROTEIN, POC: NEGATIVE
RBC # BLD AUTO: 4.36 M/UL (ref 4–5.4)
RBC #/AREA URNS HPF: 2 /HPF (ref 0–4)
SODIUM SERPL-SCNC: 136 MMOL/L (ref 136–145)
SP GR UR STRIP: <=1.005 (ref 1–1.03)
SPECIFIC GRAVITY, POC UA: NORMAL
SQUAMOUS #/AREA URNS HPF: 12 /HPF
URN SPEC COLLECT METH UR: ABNORMAL
UROBILINOGEN UR STRIP-ACNC: NEGATIVE EU/DL
UROBILINOGEN, POC UA: NEGATIVE
WBC # BLD AUTO: 5.41 K/UL (ref 3.9–12.7)
WBC #/AREA URNS HPF: >100 /HPF (ref 0–5)
YEAST URNS QL MICRO: ABNORMAL

## 2019-10-27 PROCEDURE — 87086 URINE CULTURE/COLONY COUNT: CPT

## 2019-10-27 PROCEDURE — 81000 URINALYSIS NONAUTO W/SCOPE: CPT

## 2019-10-27 PROCEDURE — 96360 HYDRATION IV INFUSION INIT: CPT

## 2019-10-27 PROCEDURE — 80053 COMPREHEN METABOLIC PANEL: CPT

## 2019-10-27 PROCEDURE — 96361 HYDRATE IV INFUSION ADD-ON: CPT

## 2019-10-27 PROCEDURE — 85025 COMPLETE CBC W/AUTO DIFF WBC: CPT

## 2019-10-27 PROCEDURE — 25000003 PHARM REV CODE 250: Performed by: STUDENT IN AN ORGANIZED HEALTH CARE EDUCATION/TRAINING PROGRAM

## 2019-10-27 PROCEDURE — 63600175 PHARM REV CODE 636 W HCPCS: Performed by: STUDENT IN AN ORGANIZED HEALTH CARE EDUCATION/TRAINING PROGRAM

## 2019-10-27 PROCEDURE — 99285 EMERGENCY DEPT VISIT HI MDM: CPT | Mod: 25

## 2019-10-27 RX ORDER — NITROFURANTOIN 25; 75 MG/1; MG/1
100 CAPSULE ORAL 2 TIMES DAILY
Qty: 14 CAPSULE | Refills: 0 | Status: SHIPPED | OUTPATIENT
Start: 2019-10-27 | End: 2019-11-03

## 2019-10-27 RX ORDER — DIPHENOXYLATE HYDROCHLORIDE AND ATROPINE SULFATE 2.5; .025 MG/1; MG/1
1 TABLET ORAL ONCE
Status: COMPLETED | OUTPATIENT
Start: 2019-10-27 | End: 2019-10-27

## 2019-10-27 RX ADMIN — SODIUM CHLORIDE, SODIUM LACTATE, POTASSIUM CHLORIDE, AND CALCIUM CHLORIDE 1000 ML: .6; .31; .03; .02 INJECTION, SOLUTION INTRAVENOUS at 06:10

## 2019-10-27 RX ADMIN — DIPHENOXYLATE HYDROCHLORIDE AND ATROPINE SULFATE 1 TABLET: 2.5; .025 TABLET ORAL at 06:10

## 2019-10-27 NOTE — ED PROVIDER NOTES
Encounter Date: 10/27/2019       History   No chief complaint on file.    Dora Hernandez is a 29 y.o. [id number] at 21w6d presents complaining of diarrhea x 1 week. She says for the last week it has been 1 time a day in the morning but yesterday she went multiple times and now the diarrhea is mainly just water. She denies fevers, chills. She has mild abdominal pain. Also says after she wipes with toilet paper when having the diarrhea, she sees some spots of blood on the toilet paper and it is painful. Denies vaginal bleeding, loss of fluids. She denies nausea/vomiting. She denies antibiotic use or having traveled out of the country in the last 30 days. Denies muscle aches.           Review of patient's allergies indicates:   Allergen Reactions    Metformin Nausea And Vomiting     Past Medical History:   Diagnosis Date    Abnormal Pap smear     Anxiety     Depression     Migraine headache     Polycystic disease, ovaries      Past Surgical History:   Procedure Laterality Date    TYMPANOSTOMY       Family History   Problem Relation Age of Onset    Hypertension Father     Stroke Father     Thyroid disease Mother     Lung cancer Maternal Grandmother      Social History     Tobacco Use    Smoking status: Never Smoker    Smokeless tobacco: Never Used   Substance Use Topics    Alcohol use: No     Frequency: Monthly or less     Drinks per session: 1 or 2     Binge frequency: Never    Drug use: No     Review of Systems   Gastrointestinal: Positive for abdominal pain and anal bleeding (spots on toilet paper).       Physical Exam     Initial Vitals   BP Pulse Resp Temp SpO2   10/27/19 0635 10/27/19 0634 10/27/19 0717 10/27/19 0717 10/27/19 0634   114/69 95 19 97.7 °F (36.5 °C) 100 %      MAP       --                Physical Exam    Vitals reviewed.  Constitutional: She appears well-developed and well-nourished.   HENT:   Head: Normocephalic and atraumatic.   Eyes: EOM are normal.   Neck: Normal range of  motion. Neck supple.   Cardiovascular: Normal rate, regular rhythm and normal heart sounds.   Pulmonary/Chest: Breath sounds normal.   Abdominal: Soft. She exhibits no mass. There is tenderness (mild discomfort with deep palpation throughout). There is no rebound and no guarding.   Genitourinary: Vagina normal and uterus normal.   Musculoskeletal: Normal range of motion.   Neurological: She is alert and oriented to person, place, and time.   Skin: Skin is warm and dry.   Psychiatric: She has a normal mood and affect.         ED Course   Procedures  Labs Reviewed   CBC W/ AUTO DIFFERENTIAL - Abnormal; Notable for the following components:       Result Value    Hemoglobin 11.8 (*)     Mean Corpuscular Hemoglobin Conc 31.8 (*)     Platelets 356 (*)     All other components within normal limits   COMPREHENSIVE METABOLIC PANEL - Abnormal; Notable for the following components:    CO2 22 (*)     BUN, Bld 3 (*)     Albumin 2.5 (*)     AST 8 (*)     ALT 7 (*)     Anion Gap 7 (*)     All other components within normal limits    Narrative:     Recoll. [id number] by SD at 10/27/2019 07:25, reason: Specimen   hemolyzed, called Rosa Isela Saucedo RN OB ER   URINALYSIS - Abnormal; Notable for the following components:    Appearance, UA Hazy (*)     Specific Gravity, UA <=1.005 (*)     Occult Blood UA Trace (*)     Leukocytes, UA 3+ (*)     All other components within normal limits   URINALYSIS MICROSCOPIC - Abnormal; Notable for the following components:    WBC, UA >100 (*)     Bacteria Moderate (*)     Yeast, UA Rare (*)     All other components within normal limits   CULTURE, URINE   POCT URINALYSIS, DIPSTICK OR TABLET REAGENT, AUTOMATED, WITH MICROSCOP          Imaging Results          US Abdomen Complete (Final result)  Result time 10/27/19 09:41:56    Final result by Elba Correia MD (10/27/19 09:41:56)                 Impression:      1. Mild hepatomegaly  2. 3 mm echogenic focus along the gallbladder wall which could  reflect a small polyp or sludge.      Electronically signed by: Elba Correia MD  Date:    10/27/2019  Time:    09:41             Narrative:    EXAMINATION:  US ABDOMEN COMPLETE    CLINICAL HISTORY:  Right upper quadrant pain    TECHNIQUE:  Complete abdominal ultrasound (including pancreas, aorta, liver, gallbladder, common bile duct, IVC, kidneys, and spleen) was performed.    COMPARISON:  None    FINDINGS:  Exam is limited by bowel gas.  Pancreas: The visualized portions of pancreas appear normal.    Aorta: Obscured by bowel gas.    Liver: 18.3 cm, mildly enlarged.  Homogeneous parenchymal echotexture. No focal lesions.    Gallbladder: No calculi, wall thickening, or pericholecystic fluid.  A 3 mm echogenic focus along the gallbladder wall without shadowing is consistent with small polyp versus sludge.  Negative sonographic Andrews's sign.    Biliary system: 2.6 mm common bile duct.  No intrahepatic ductal dilatation.    Inferior vena cava: Normal in appearance.    Right kidney: 12.1 cm. No hydronephrosis.    Left kidney: 12.7 cm. No hydronephrosis.    Spleen: 9.1 cm.  Normal in size with homogeneous echotexture.    Miscellaneous: No ascites.                                 Medical Decision Making:   ED Management:  IV LR bolus x 1 L   CBC and CMP wnl  FHT confirmed with doppler.   No contractions on toco.   Lomotil > patient feeling better now.   RUQ ultrasound ordered which shows an echogenic focus in gallbladder concerning for possible sludge. Needs follow up with GI.   U/A shows 2+ leuks and blood >> will send abx to patient pharmacy for treatment.   Other:   I have discussed this case with another health care provider.       <> Summary of the Discussion: Dr. Zepeda                   ED Course as of Oct 27 1014   Sun Oct 27, 2019   2113 I evaluated Ms. Hernandez and discussed case with resident team.  Pt presents with diarrhea for the last week that worsened yesterday and was assd with upper abd pain and  and some specks of blood.  No sick contacts/travel/abx.   VSS, fetal heart tones verified  RUQ ttp, neg murpheys  Benign abd exam  Will get cbc, cmp, bolus of fluids, and RUQ US  If she is able to collect stool sample, would consider e coli 0157:h7 and shiga toxin testing  as well as stool for leukocytes    [HU]   0841 U/A with 2+ leuks, otherwise negative. Will send UA    [HU]   0843 Pt feeling better, informed of normal bloodwork. Will follow up US    [HU]   1000 Pt feeling better, u/a consistent with UTI.  Will send for cx.  Pt asked to call Dr. Adkins office in 3 days to ensure on correct abx.  No questions at this time    [HU]      ED Course User Index  [] Katie Ndiaye DO     Clinical Impression:       ICD-10-CM ICD-9-CM   1. 21 weeks gestation of pregnancy Z3A.21 V22.2   2. Diarrhea, unspecified type R19.7 787.91   3. Right upper quadrant abdominal pain R10.11 789.01   4. Urinary tract infection affecting pregnancy O23.40 646.60     599.0               Maryanne Palma M.D.  OBGYN PGY1

## 2019-10-27 NOTE — DISCHARGE INSTRUCTIONS
Call clinic [phone] or L & D after hours at [phone] for vaginal bleeding, leakage of fluids, contractions 4-5 in one hour, decreased fetal movements ( 10 kicks in 2 hours), headache not relieved by Tylenol, blurry vision, or temp of 100.4 or greater.  Begin doing fetal kick counts, at least 10 movements in 2 hours starting at 28 weeks gestation.  Keep next clinic appointment

## 2019-10-27 NOTE — ED NOTES
Patient discharged home self care. Patient accompanied by spouse. Discharge instructions given to pt at bedside. Pt verbalized understanding of instructions. Vital signs stable. Patient remained afebrile. No apparent distress noted. 18 gauge peripheral IV removed;catheter intact. Pt tolerated well.

## 2019-10-29 LAB — BACTERIA UR CULT: NORMAL

## 2019-11-13 ENCOUNTER — PATIENT OUTREACH (OUTPATIENT)
Dept: ADMINISTRATIVE | Facility: OTHER | Age: 29
End: 2019-11-13

## 2019-11-14 ENCOUNTER — HOSPITAL ENCOUNTER (OUTPATIENT)
Dept: CARDIOLOGY | Facility: CLINIC | Age: 29
Discharge: HOME | End: 2019-11-14
Payer: COMMERCIAL

## 2019-11-14 ENCOUNTER — ROUTINE PRENATAL (OUTPATIENT)
Dept: OBSTETRICS AND GYNECOLOGY | Facility: CLINIC | Age: 29
End: 2019-11-14
Payer: COMMERCIAL

## 2019-11-14 VITALS
DIASTOLIC BLOOD PRESSURE: 77 MMHG | WEIGHT: 236.13 LBS | BODY MASS INDEX: 36.98 KG/M2 | SYSTOLIC BLOOD PRESSURE: 115 MMHG

## 2019-11-14 DIAGNOSIS — Z34.03 ENCOUNTER FOR SUPERVISION OF NORMAL FIRST PREGNANCY IN THIRD TRIMESTER: Primary | ICD-10-CM

## 2019-11-14 DIAGNOSIS — Z34.03 ENCOUNTER FOR SUPERVISION OF NORMAL FIRST PREGNANCY IN THIRD TRIMESTER: ICD-10-CM

## 2019-11-14 PROCEDURE — 93010 ELECTROCARDIOGRAM REPORT: CPT | Mod: S$GLB,,, | Performed by: INTERNAL MEDICINE

## 2019-11-14 PROCEDURE — 93010 EKG 12-LEAD: ICD-10-PCS | Mod: S$GLB,,, | Performed by: INTERNAL MEDICINE

## 2019-11-14 PROCEDURE — 99999 PR PBB SHADOW E&M-EST. PATIENT-LVL III: ICD-10-PCS | Mod: PBBFAC,,, | Performed by: OBSTETRICS & GYNECOLOGY

## 2019-11-14 PROCEDURE — 93005 ELECTROCARDIOGRAM TRACING: CPT | Mod: S$GLB,,, | Performed by: OBSTETRICS & GYNECOLOGY

## 2019-11-14 PROCEDURE — 0502F PR SUBSEQUENT PRENATAL CARE: ICD-10-PCS | Mod: CPTII,S$GLB,, | Performed by: OBSTETRICS & GYNECOLOGY

## 2019-11-14 PROCEDURE — 0502F SUBSEQUENT PRENATAL CARE: CPT | Mod: CPTII,S$GLB,, | Performed by: OBSTETRICS & GYNECOLOGY

## 2019-11-14 PROCEDURE — 99999 PR PBB SHADOW E&M-EST. PATIENT-LVL III: CPT | Mod: PBBFAC,,, | Performed by: OBSTETRICS & GYNECOLOGY

## 2019-11-14 PROCEDURE — 93005 EKG 12-LEAD: ICD-10-PCS | Mod: S$GLB,,, | Performed by: OBSTETRICS & GYNECOLOGY

## 2019-11-14 NOTE — PROGRESS NOTES
Near LOC in [facility] at 5pm, overheated, standing in the line at [facility]   Ate salad at 12pm, in training all day - 2 bottles of water throughout the day & pineapple soda with lunch   Supportive measures discussed in detail

## 2019-11-15 ENCOUNTER — TELEPHONE (OUTPATIENT)
Dept: OBSTETRICS AND GYNECOLOGY | Facility: CLINIC | Age: 29
End: 2019-11-15

## 2019-11-15 DIAGNOSIS — O99.810 ABNORMAL GLUCOSE AFFECTING PREGNANCY: Primary | ICD-10-CM

## 2019-11-15 NOTE — TELEPHONE ENCOUNTER
----- Message from Radha Short MD sent at 11/15/2019  4:36 PM CST -----  Please inform patient of normal EKG

## 2019-11-18 ENCOUNTER — PATIENT MESSAGE (OUTPATIENT)
Dept: OBSTETRICS AND GYNECOLOGY | Facility: CLINIC | Age: 29
End: 2019-11-18

## 2019-11-18 ENCOUNTER — PROCEDURE VISIT (OUTPATIENT)
Dept: MATERNAL FETAL MEDICINE | Facility: CLINIC | Age: 29
End: 2019-11-18
Payer: COMMERCIAL

## 2019-11-18 DIAGNOSIS — O40.9XX0 POLYHYDRAMNIOS, ANTEPARTUM, SINGLE OR UNSPECIFIED FETUS: Primary | ICD-10-CM

## 2019-11-18 PROCEDURE — 76816 PR  US,PREGNANT UTERUS,F/U,TRANSABD APP: ICD-10-PCS | Mod: S$GLB,,, | Performed by: OBSTETRICS & GYNECOLOGY

## 2019-11-18 PROCEDURE — 76816 OB US FOLLOW-UP PER FETUS: CPT | Mod: S$GLB,,, | Performed by: OBSTETRICS & GYNECOLOGY

## 2019-11-19 ENCOUNTER — PATIENT MESSAGE (OUTPATIENT)
Dept: OBSTETRICS AND GYNECOLOGY | Facility: CLINIC | Age: 29
End: 2019-11-19

## 2019-11-25 ENCOUNTER — LAB VISIT (OUTPATIENT)
Dept: LAB | Facility: HOSPITAL | Age: 29
End: 2019-11-25
Attending: OBSTETRICS & GYNECOLOGY
Payer: COMMERCIAL

## 2019-11-25 ENCOUNTER — PATIENT OUTREACH (OUTPATIENT)
Dept: ADMINISTRATIVE | Facility: OTHER | Age: 29
End: 2019-11-25

## 2019-11-25 DIAGNOSIS — O99.810 ABNORMAL GLUCOSE AFFECTING PREGNANCY: ICD-10-CM

## 2019-11-25 LAB
GLUCOSE SERPL-MCNC: 125 MG/DL
GLUCOSE SERPL-MCNC: 128 MG/DL
GLUCOSE SERPL-MCNC: 140 MG/DL
GLUCOSE SERPL-MCNC: 80 MG/DL (ref 70–110)

## 2019-11-25 PROCEDURE — 82951 GLUCOSE TOLERANCE TEST (GTT): CPT

## 2019-11-25 PROCEDURE — 36415 COLL VENOUS BLD VENIPUNCTURE: CPT

## 2019-11-25 PROCEDURE — 82952 GTT-ADDED SAMPLES: CPT

## 2019-11-27 ENCOUNTER — ROUTINE PRENATAL (OUTPATIENT)
Dept: OBSTETRICS AND GYNECOLOGY | Facility: CLINIC | Age: 29
End: 2019-11-27
Payer: COMMERCIAL

## 2019-11-27 VITALS
WEIGHT: 237.63 LBS | BODY MASS INDEX: 37.22 KG/M2 | SYSTOLIC BLOOD PRESSURE: 110 MMHG | DIASTOLIC BLOOD PRESSURE: 80 MMHG

## 2019-11-27 DIAGNOSIS — F32.A DEPRESSION AFFECTING PREGNANCY IN THIRD TRIMESTER, ANTEPARTUM: ICD-10-CM

## 2019-11-27 DIAGNOSIS — F41.1 GAD (GENERALIZED ANXIETY DISORDER): ICD-10-CM

## 2019-11-27 DIAGNOSIS — R55 NEAR SYNCOPE: Primary | ICD-10-CM

## 2019-11-27 DIAGNOSIS — R42 DIZZINESS: ICD-10-CM

## 2019-11-27 DIAGNOSIS — O99.343 DEPRESSION AFFECTING PREGNANCY IN THIRD TRIMESTER, ANTEPARTUM: ICD-10-CM

## 2019-11-27 DIAGNOSIS — Z34.80 SUPERVISION OF OTHER NORMAL PREGNANCY: ICD-10-CM

## 2019-11-27 PROCEDURE — 99999 PR PBB SHADOW E&M-EST. PATIENT-LVL III: CPT | Mod: PBBFAC,,, | Performed by: OBSTETRICS & GYNECOLOGY

## 2019-11-27 PROCEDURE — 99999 PR PBB SHADOW E&M-EST. PATIENT-LVL III: ICD-10-PCS | Mod: PBBFAC,,, | Performed by: OBSTETRICS & GYNECOLOGY

## 2019-11-27 PROCEDURE — 0502F SUBSEQUENT PRENATAL CARE: CPT | Mod: CPTII,S$GLB,, | Performed by: OBSTETRICS & GYNECOLOGY

## 2019-11-27 PROCEDURE — 0502F PR SUBSEQUENT PRENATAL CARE: ICD-10-PCS | Mod: CPTII,S$GLB,, | Performed by: OBSTETRICS & GYNECOLOGY

## 2019-11-27 NOTE — PROGRESS NOTES
Patient reports syncopal episode earlier this week, episode #2. Patient denies any CP, SOB, weakness. Does report that she gets really hot and then passes out. She does report LOC.   Plan Cardiology consult, leave at work, no driving, no climbing.  Patient also with depression, consult placed. May consider restarting meds.

## 2019-11-29 DIAGNOSIS — R55 SYNCOPE AND COLLAPSE: ICD-10-CM

## 2019-12-02 NOTE — PROGRESS NOTES
Cardiology Clinic Note  Reason for Visit: presyncope    HPI:     Dora Hernandez is a 29 y.o. pregnant (28 weeks) F, who presents for evaluation of two syncopal episodes.    She reports having two episodes over the last 3 weeks that involved syncope or near syncope. ~3 weeks ago, she was in a long line at [facility] and began to feel hot. She then began leaning on the basket and eventually slumped down and lost consciousness. Upon awakening, she was sweaty. She then sat in the [employer] area until she felt back to normal. More recently, she was at home and looking for an object in a closet. She began to feel hot and weak then had a fall. She did not lose consciousness. She also reports similar symptoms when standing for a prolonged time while waiting for an elevator. During all episodes, she has a prodrome. No chest discomfort, shortness of breath, or palpitations. She did lose consciousness during the delivery of her first child. Other than that episode, no prior similar episodes prior to 24/25 weeks gestation of this pregnancy.    She has a normal EKG on 11/14/19. She is awaiting an echo.  Medical: anxiety, depression, migraines, PCOS  Surgical: tympanostomy  Family: HTN, stroke  Social: never smoked, rare alcohol use    ROS:    Constitution: Negative for fever or chills.  HENT: Negative for  headaches.  Eyes: Negative for blurred vision.   Cardiovascular: See above  Pulmonary: Negative for SOB. Negative for cough.   Gastrointestinal: Negative for nausea/vomiting.   : Negative for dysuria.   Skin: Negative for rashes.  Neurological: Negative for focal weakness.  Psychological: Negative for depression.  PMH:     Past Medical History:   Diagnosis Date    Abnormal Pap smear     Anxiety     Depression     Migraine headache     Polycystic disease, ovaries      Past Surgical History:   Procedure Laterality Date    TYMPANOSTOMY       Allergies:     Review of patient's allergies indicates:   Allergen  "Reactions    Metformin Nausea And Vomiting     Medications:     Current Outpatient Medications on File Prior to Visit   Medication Sig Dispense Refill    cetirizine (ZYRTEC) 10 MG tablet Take 1 tablet (10 mg total) by mouth once daily. (Patient not taking: Reported on 11/27/2019)  0    fluticasone propionate (FLONASE) 50 mcg/actuation nasal spray 1 spray (50 mcg total) by Each Nostril route once daily. (Patient not taking: Reported on 11/27/2019) 16 g 0    ondansetron (ZOFRAN-ODT) 4 MG TbDL Take 1 tablet (4 mg total) by mouth every 8 (eight) hours as needed (nausea). (Patient not taking: Reported on 11/27/2019) 12 tablet 0     No current facility-administered medications on file prior to visit.      Social History:     Social History     Tobacco Use    Smoking status: Never Smoker    Smokeless tobacco: Never Used   Substance Use Topics    Alcohol use: No     Frequency: Monthly or less     Drinks per session: 1 or 2     Binge frequency: Never     Family History:     Family History   Problem Relation Age of Onset    Hypertension Father     Stroke Father     Thyroid disease Mother     Lung cancer Maternal Grandmother      Physical Exam:   /79 (BP Location: Left arm, Patient Position: Sitting, BP Method: Large (Automatic))   Pulse 107   Ht 5' 7" (1.702 m)   Wt 108.2 kg (238 lb 8.6 oz)   LMP 05/27/2019 (Exact Date)   SpO2 100%   BMI 37.36 kg/m²      Constitutional: No apparent distress, conversant  HEENT: Sclera anicteric, extraocular movements intact  Neck: No jugular venous distension, no carotid bruits  CV: Regular rate and rhythm, no murmurs rubs or gallops, normal S1/S2  Pulm: Clear to auscultation bilaterally  GI: Abdomen soft, no palpable masses, gravid uterus  Extremities: No lower extremity edema, warm with palpable pulses  Skin: No ecchymosis, erythema, or ulcers  Psych: Alert and oriented to person place location, appropriate affect  Neuro: No focal deficits    Labs:     Blood " Tests:  Lab Results   Component Value Date     (L) 2019    K 3.6 2019     2019    CO2 19 (L) 2019    BUN 4 (L) 2019    CREATININE 0.6 2019     (H) 2019    HGBA1C 5.6 2019    AST 7 (L) 2019    ALT 6 (L) 2019    ALBUMIN 2.7 (L) 2019    PROT 7.3 2019    BILITOT 0.2 2019    WBC 7.55 2019    HGB 11.9 (L) 2019    HCT 39.1 2019    MCV 87 2019     2019    TSH 1.022 2019       Lab Results   Component Value Date    CHOL 201 (H) 2019    HDL 67 2019    TRIG 56 2019       Lab Results   Component Value Date    LDLCALC 122.8 2019       Urine Tests:  Lab Results   Component Value Date    COLORU clear yellow 10/27/2019    COLORU Yellow 10/27/2019    APPEARANCEUA Hazy (A) 10/27/2019    PHUR within normal limits 10/27/2019    PHUR 7.0 10/27/2019    SPECGRAV within normal limits 10/27/2019    SPECGRAV <=1.005 (A) 10/27/2019    PROTEINUA Negative 10/27/2019    GLUCUA Negative 10/27/2019    KETONESU negative 10/27/2019    KETONESU Negative 10/27/2019    BILIRUBINUA Negative 10/27/2019    OCCULTUA Trace (A) 10/27/2019    NITRITE negative 10/27/2019    NITRITE Negative 10/27/2019    UROBILINOGEN negative 10/27/2019    UROBILINOGEN Negative 10/27/2019    LEUKOCYTESUR 3+ (A) 10/27/2019       Imaging:     Echocardiogram  None    Stress testing  None    Cath Lab  None    Other  None    EK19  Normal sinus rhythm  Normal ECG    Assessment:     1. Syncope and collapse      Plan:     Syncope during pregnancy  Discussed the etiology and prognosis of neurocardiogenic syncope   Encouraged to avoid dehydration, extreme warm environments, tight clothing, and straining, as able  Encouraged hydration plus salt intake  Offered compression stockings    Obtain echo to evaluate for structural heart disease    OK to continue with planned spontaneous vaginal delivery. Caution with  oxytocin and spinal anesthesia.  Avoid lying supine. Discussed left lateral tilt to avoid aorto-caval compression    Signed:  Basil Grover MD  Cardiology     12/9/2019 9:25 AM    Follow-up:     Future Appointments   Date Time Provider Department Center   12/9/2019  9:00 AM Jordan Grover III, MD [facility] CARDIO Rud[facility]   12/9/2019 11:00 AM Radha Short MD [facility] OBLATANYA [place] [facility]   12/9/2019  1:40 PM Kwabena Lopez MD [facility]   12/9/2019  1:40 PM ROOM 2, [facility]   1/9/2020 11:00 AM Radha Short MD [facility]

## 2019-12-05 ENCOUNTER — PATIENT OUTREACH (OUTPATIENT)
Dept: ADMINISTRATIVE | Facility: OTHER | Age: 29
End: 2019-12-05

## 2019-12-09 ENCOUNTER — ROUTINE PRENATAL (OUTPATIENT)
Dept: OBSTETRICS AND GYNECOLOGY | Facility: CLINIC | Age: 29
End: 2019-12-09
Payer: COMMERCIAL

## 2019-12-09 ENCOUNTER — OFFICE VISIT (OUTPATIENT)
Dept: CARDIOLOGY | Facility: CLINIC | Age: 29
End: 2019-12-09
Payer: COMMERCIAL

## 2019-12-09 ENCOUNTER — PROCEDURE VISIT (OUTPATIENT)
Dept: MATERNAL FETAL MEDICINE | Facility: CLINIC | Age: 29
End: 2019-12-09
Payer: COMMERCIAL

## 2019-12-09 ENCOUNTER — INITIAL CONSULT (OUTPATIENT)
Dept: MATERNAL FETAL MEDICINE | Facility: CLINIC | Age: 29
End: 2019-12-09
Attending: OBSTETRICS & GYNECOLOGY
Payer: COMMERCIAL

## 2019-12-09 ENCOUNTER — HOSPITAL ENCOUNTER (EMERGENCY)
Facility: OTHER | Age: 29
Discharge: HOME | End: 2019-12-09
Payer: COMMERCIAL

## 2019-12-09 VITALS
HEART RATE: 118 BPM | BODY MASS INDEX: 37.44 KG/M2 | WEIGHT: 238.56 LBS | OXYGEN SATURATION: 98 % | TEMPERATURE: 98 F | HEART RATE: 107 BPM | SYSTOLIC BLOOD PRESSURE: 118 MMHG | SYSTOLIC BLOOD PRESSURE: 122 MMHG | DIASTOLIC BLOOD PRESSURE: 79 MMHG | RESPIRATION RATE: 20 BRPM | HEIGHT: 67 IN | DIASTOLIC BLOOD PRESSURE: 73 MMHG | OXYGEN SATURATION: 100 %

## 2019-12-09 VITALS
BODY MASS INDEX: 37.36 KG/M2 | DIASTOLIC BLOOD PRESSURE: 72 MMHG | WEIGHT: 238.56 LBS | SYSTOLIC BLOOD PRESSURE: 112 MMHG

## 2019-12-09 VITALS — SYSTOLIC BLOOD PRESSURE: 112 MMHG | WEIGHT: 237 LBS | BODY MASS INDEX: 37.12 KG/M2 | DIASTOLIC BLOOD PRESSURE: 73 MMHG

## 2019-12-09 DIAGNOSIS — R10.9 ABDOMINAL PAIN, UNSPECIFIED ABDOMINAL LOCATION: ICD-10-CM

## 2019-12-09 DIAGNOSIS — R19.7 DIARRHEA, UNSPECIFIED TYPE: ICD-10-CM

## 2019-12-09 DIAGNOSIS — O99.343 DEPRESSION DURING PREGNANCY IN THIRD TRIMESTER: ICD-10-CM

## 2019-12-09 DIAGNOSIS — R55 SYNCOPE AND COLLAPSE: Primary | ICD-10-CM

## 2019-12-09 DIAGNOSIS — Z3A.28 28 WEEKS GESTATION OF PREGNANCY: Primary | ICD-10-CM

## 2019-12-09 DIAGNOSIS — Z34.03 ENCOUNTER FOR SUPERVISION OF NORMAL FIRST PREGNANCY IN THIRD TRIMESTER: ICD-10-CM

## 2019-12-09 DIAGNOSIS — O40.9XX0 POLYHYDRAMNIOS, ANTEPARTUM, SINGLE OR UNSPECIFIED FETUS: ICD-10-CM

## 2019-12-09 DIAGNOSIS — F32.A DEPRESSION DURING PREGNANCY IN THIRD TRIMESTER: ICD-10-CM

## 2019-12-09 LAB
ALBUMIN SERPL BCP-MCNC: 2.6 G/DL (ref 3.5–5.2)
ALP SERPL-CCNC: 108 U/L (ref 55–135)
ALT SERPL W/O P-5'-P-CCNC: 11 U/L (ref 10–44)
ANION GAP SERPL CALC-SCNC: 10 MMOL/L (ref 8–16)
AST SERPL-CCNC: 23 U/L (ref 10–40)
BASOPHILS # BLD AUTO: 0.02 K/UL (ref 0–0.2)
BASOPHILS NFR BLD: 0.2 % (ref 0–1.9)
BILIRUB SERPL-MCNC: 0.3 MG/DL (ref 0.1–1)
BUN SERPL-MCNC: 5 MG/DL (ref 6–20)
CALCIUM SERPL-MCNC: 8.9 MG/DL (ref 8.7–10.5)
CHLORIDE SERPL-SCNC: 107 MMOL/L (ref 95–110)
CO2 SERPL-SCNC: 15 MMOL/L (ref 23–29)
CREAT SERPL-MCNC: 0.6 MG/DL (ref 0.5–1.4)
DIFFERENTIAL METHOD: ABNORMAL
EOSINOPHIL # BLD AUTO: 0.1 K/UL (ref 0–0.5)
EOSINOPHIL NFR BLD: 0.8 % (ref 0–8)
ERYTHROCYTE [DISTWIDTH] IN BLOOD BY AUTOMATED COUNT: 15.2 % (ref 11.5–14.5)
EST. GFR  (AFRICAN AMERICAN): >60 ML/MIN/1.73 M^2
EST. GFR  (NON AFRICAN AMERICAN): >60 ML/MIN/1.73 M^2
GLUCOSE SERPL-MCNC: 93 MG/DL (ref 70–110)
HCT VFR BLD AUTO: 40.2 % (ref 37–48.5)
HGB BLD-MCNC: 12.3 G/DL (ref 12–16)
IMM GRANULOCYTES # BLD AUTO: 0.04 K/UL (ref 0–0.04)
IMM GRANULOCYTES NFR BLD AUTO: 0.5 % (ref 0–0.5)
INFLUENZA A, MOLECULAR: NEGATIVE
INFLUENZA B, MOLECULAR: NEGATIVE
LYMPHOCYTES # BLD AUTO: 0.7 K/UL (ref 1–4.8)
LYMPHOCYTES NFR BLD: 8.6 % (ref 18–48)
MCH RBC QN AUTO: 26.3 PG (ref 27–31)
MCHC RBC AUTO-ENTMCNC: 30.6 G/DL (ref 32–36)
MCV RBC AUTO: 86 FL (ref 82–98)
MONOCYTES # BLD AUTO: 0.4 K/UL (ref 0.3–1)
MONOCYTES NFR BLD: 4.3 % (ref 4–15)
NEUTROPHILS # BLD AUTO: 7.4 K/UL (ref 1.8–7.7)
NEUTROPHILS NFR BLD: 85.6 % (ref 38–73)
NRBC BLD-RTO: 0 /100 WBC
PLATELET # BLD AUTO: 280 K/UL (ref 150–350)
PMV BLD AUTO: 9.4 FL (ref 9.2–12.9)
POTASSIUM SERPL-SCNC: 5.4 MMOL/L (ref 3.5–5.1)
PROT SERPL-MCNC: 7.5 G/DL (ref 6–8.4)
RBC # BLD AUTO: 4.67 M/UL (ref 4–5.4)
SODIUM SERPL-SCNC: 132 MMOL/L (ref 136–145)
SPECIMEN SOURCE: NORMAL
WBC # BLD AUTO: 8.63 K/UL (ref 3.9–12.7)

## 2019-12-09 PROCEDURE — 0502F SUBSEQUENT PRENATAL CARE: CPT | Mod: CPTII,S$GLB,, | Performed by: OBSTETRICS & GYNECOLOGY

## 2019-12-09 PROCEDURE — 3008F PR BODY MASS INDEX (BMI) DOCUMENTED: ICD-10-PCS | Mod: CPTII,S$GLB,, | Performed by: OBSTETRICS & GYNECOLOGY

## 2019-12-09 PROCEDURE — 99999 PR PBB SHADOW E&M-EST. PATIENT-LVL II: ICD-10-PCS | Mod: PBBFAC,,, | Performed by: OBSTETRICS & GYNECOLOGY

## 2019-12-09 PROCEDURE — 76815 OB US LIMITED FETUS(S): CPT | Mod: S$GLB,,, | Performed by: OBSTETRICS & GYNECOLOGY

## 2019-12-09 PROCEDURE — 99203 PR OFFICE/OUTPT VISIT, NEW, LEVL III, 30-44 MIN: ICD-10-PCS | Mod: S$GLB,,, | Performed by: INTERNAL MEDICINE

## 2019-12-09 PROCEDURE — 59025 FETAL NON-STRESS TEST: CPT

## 2019-12-09 PROCEDURE — 59025 FETAL NON-STRESS TEST: CPT | Mod: 26,,, | Performed by: OBSTETRICS & GYNECOLOGY

## 2019-12-09 PROCEDURE — 0502F PR SUBSEQUENT PRENATAL CARE: ICD-10-PCS | Mod: CPTII,S$GLB,, | Performed by: OBSTETRICS & GYNECOLOGY

## 2019-12-09 PROCEDURE — 3008F BODY MASS INDEX DOCD: CPT | Mod: CPTII,S$GLB,, | Performed by: INTERNAL MEDICINE

## 2019-12-09 PROCEDURE — 99203 OFFICE O/P NEW LOW 30 MIN: CPT | Mod: S$GLB,,, | Performed by: INTERNAL MEDICINE

## 2019-12-09 PROCEDURE — 25000003 PHARM REV CODE 250: Performed by: STUDENT IN AN ORGANIZED HEALTH CARE EDUCATION/TRAINING PROGRAM

## 2019-12-09 PROCEDURE — 59025 PR FETAL 2N-STRESS TEST: ICD-10-PCS | Mod: 26,,, | Performed by: OBSTETRICS & GYNECOLOGY

## 2019-12-09 PROCEDURE — 99213 PR OFFICE/OUTPT VISIT, EST, LEVL III, 20-29 MIN: ICD-10-PCS | Mod: 25,S$GLB,, | Performed by: OBSTETRICS & GYNECOLOGY

## 2019-12-09 PROCEDURE — 99999 PR PBB SHADOW E&M-EST. PATIENT-LVL III: ICD-10-PCS | Mod: PBBFAC,,, | Performed by: INTERNAL MEDICINE

## 2019-12-09 PROCEDURE — 99999 PR PBB SHADOW E&M-EST. PATIENT-LVL II: CPT | Mod: PBBFAC,,, | Performed by: OBSTETRICS & GYNECOLOGY

## 2019-12-09 PROCEDURE — 76815 PR  US,PREGNANT UTERUS,LIMITED, 1/> FETUSES: ICD-10-PCS | Mod: S$GLB,,, | Performed by: OBSTETRICS & GYNECOLOGY

## 2019-12-09 PROCEDURE — 99284 PR EMERGENCY DEPT VISIT,LEVEL IV: ICD-10-PCS | Mod: 25,,, | Performed by: OBSTETRICS & GYNECOLOGY

## 2019-12-09 PROCEDURE — 80053 COMPREHEN METABOLIC PANEL: CPT

## 2019-12-09 PROCEDURE — 99213 OFFICE O/P EST LOW 20 MIN: CPT | Mod: 25,S$GLB,, | Performed by: OBSTETRICS & GYNECOLOGY

## 2019-12-09 PROCEDURE — 85025 COMPLETE CBC W/AUTO DIFF WBC: CPT

## 2019-12-09 PROCEDURE — 99999 PR PBB SHADOW E&M-EST. PATIENT-LVL III: CPT | Mod: PBBFAC,,, | Performed by: INTERNAL MEDICINE

## 2019-12-09 PROCEDURE — 87502 INFLUENZA DNA AMP PROBE: CPT

## 2019-12-09 PROCEDURE — 3008F PR BODY MASS INDEX (BMI) DOCUMENTED: ICD-10-PCS | Mod: CPTII,S$GLB,, | Performed by: INTERNAL MEDICINE

## 2019-12-09 PROCEDURE — 99284 EMERGENCY DEPT VISIT MOD MDM: CPT | Mod: 25

## 2019-12-09 PROCEDURE — 99284 EMERGENCY DEPT VISIT MOD MDM: CPT | Mod: 25,,, | Performed by: OBSTETRICS & GYNECOLOGY

## 2019-12-09 PROCEDURE — 3008F BODY MASS INDEX DOCD: CPT | Mod: CPTII,S$GLB,, | Performed by: OBSTETRICS & GYNECOLOGY

## 2019-12-09 RX ORDER — ONDANSETRON 4 MG/1
4 TABLET, ORALLY DISINTEGRATING ORAL ONCE AS NEEDED
Status: COMPLETED | OUTPATIENT
Start: 2019-12-09 | End: 2019-12-09

## 2019-12-09 RX ORDER — POTASSIUM CHLORIDE 20 MEQ/1
20 TABLET, EXTENDED RELEASE ORAL ONCE
Status: DISCONTINUED | OUTPATIENT
Start: 2019-12-09 | End: 2019-12-09

## 2019-12-09 RX ADMIN — ONDANSETRON 4 MG: 4 TABLET, ORALLY DISINTEGRATING ORAL at 09:12

## 2019-12-09 NOTE — LETTER
December 9, 2019      Radha Short MD  [address]  [place] [address]           [facility] - Cardiology  [address]  [place] [address]  Phone: [phone]          Patient: Dora Hernandez   MR Number: [id number]   YOB: 1990   Date of Visit: 12/9/2019       Dear Dr. Radha Short:    Thank you for referring Dora Hernandez to me for evaluation. Attached you will find relevant portions of my assessment and plan of care.    If you have questions, please do not hesitate to call me. I look forward to following Dora Hernandez along with you.    Sincerely,    Jordan Grover III, MD    Enclosure  CC:  No Recipients    If you would like to receive this communication electronically, please contact [email or web address] or [phone] to request more information on [employer] Link access.    For providers and/or their staff who would like to refer a patient to [facility], please contact us through our one-stop-shop provider referral line, [facility] , at [phone].    If you feel you have received this communication in error or would no longer like to receive these types of communications, please e-mail [email or web address]

## 2019-12-09 NOTE — PROGRESS NOTES
Chief complaint: Syncope, polyhydramnios    Provider requesting consultation: Dr. Short    29 y.o. [id number] 28w0d EGA.    PMH:  Past Medical History:   Diagnosis Date    Abnormal Pap smear     Anxiety     Depression     Migraine headache     Polycystic disease, ovaries        PObHx:  OB History    Para Term  AB Living   4 1 1   2 1   SAB TAB Ectopic Multiple Live Births   1 1     1      # Outcome Date GA Lbr Malik/2nd Weight Sex Delivery Anes PTL Lv   4 Current            3 Term 16 37w4d  3.487 kg (7 lb 11 oz) M Vag-Spont EPI N MARCO A      Complications: Polyhydramnios   2 TAB 16 6w0d    TAB      1 SAB 13 6w0d    SAB         Complications: Blighted ovum       PSH:  Past Surgical History:   Procedure Laterality Date    TYMPANOSTOMY         Family history:family history includes Hypertension in her father; Lung cancer in her maternal grandmother; Stroke in her father; Thyroid disease in her mother.    Social history: reports that she has never smoked. She has never used smokeless tobacco. She reports that she does not drink alcohol or use drugs.    A detailed fetal anatomical ultrasound was completed today.  See details in imaging section of [facility].    The patient is referred for a history of syncope and near syncope mainly in the standing position.  She also has had polyhydramnios diagnosed on her last U/S.  Today her AF is upper limits of normal and lower than on her prior scan ( 29.4 to 24.7).  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  I explained the pathophysiology of the condition and how caval compression could reduce blood flow to the heart by 30%. This leads to lowered flow to the brain and symptoms identical to her complaint. The  Effect can be minimalised by aggressive oral hydration and support hose.      We also discussed her polyhydramnios which ha decreased. Polyhydramnios can be seen in up to 3.3% of all pregnancies.  Causes of  polyhydramnios include idiopathic (65%), diabetes mellitus (28%), congenital anomalies (4.3%) and mulitple gestation (2.6%).  No pathological cause of polyhydramnios could be found today and thus I would recommend routine care with plan for delivery at 39 weeks or later.  Though the evidence of an improved fetal outcome is unclear, some physicians advocate twice-weekly fetal nonstress tests until delivering.  I will leave this decision regarding fetal testing to the patient and her attending physician especially since it has not increased.    Results of today's ultrasound discussed with patient.  I spent 15 minutes with patient today over half of which was in consultation separate of her ultrasound examination.     Referring physician to receive copy of today's consultation via electronic medical record.    The patient was given an opportunity to ask questions about management and the diease process.  She expressed an understanding of and agreement to the above impression and plan. All questions were answered to her satisfaction.  She was given contact information to the [facility] clinic to address further concerns.

## 2019-12-09 NOTE — LETTER
December 9, 2019      Radha Short MD  [address]  [address]  [place] [address]           [facility] [address]  [address]  [facility] [address]  Phone: [phone]          Patient: Dora Hernandez   MR Number: [id number]   YOB: 1990   Date of Visit: 12/9/2019       Dear Dr. Radha Short:    Thank you for referring Dora Hernandez to me for evaluation. Attached you will find relevant portions of my assessment and plan of care.    If you have questions, please do not hesitate to call me. I look forward to following Dora Hernandez along with you.    Sincerely,    Kwabena Lopez MD    Enclosure  CC:  No Recipients    If you would like to receive this communication electronically, please contact [email or web address] or [phone] to request more information on [employer] Link access.    For providers and/or their staff who would like to refer a patient to [facility], please contact us through our one-stop-shop provider referral line, [facility], at [phone].    If you feel you have received this communication in error or would no longer like to receive these types of communications, please e-mail [email or web address]

## 2019-12-10 ENCOUNTER — TELEPHONE (OUTPATIENT)
Dept: OBSTETRICS AND GYNECOLOGY | Facility: CLINIC | Age: 29
End: 2019-12-10

## 2019-12-10 NOTE — ED PROVIDER NOTES
Encounter Date: 2019       History     Chief Complaint   Patient presents with    Abdominal Pain    Diarrhea     Dora Hernandez is a 29 y.o. [id number] at 28w0d presents complaining of abdominal cramping and diarrhea. First episode occurred around midday. GI cramps have gotten worse throughout the day with multiple episodes of diarrhea. No blood in stool.     Last seen in clinic for routine prenatal care today with Dr. Short.     This IUP is complicated by:  Obesity: current BMI 37.4  Anxiety/depression: no meds  Failed 1h glucose test: 152; with a normal 3h GTT    Patient reports occasional contractions, denies vaginal bleeding, denies LOF.   Fetal Movement: normal.         Review of patient's allergies indicates:   Allergen Reactions    Metformin Nausea And Vomiting     Past Medical History:   Diagnosis Date    Abnormal Pap smear     Anxiety     Depression     Migraine headache     Polycystic disease, ovaries      Past Surgical History:   Procedure Laterality Date    TYMPANOSTOMY       Family History   Problem Relation Age of Onset    Hypertension Father     Stroke Father     Thyroid disease Mother     Lung cancer Maternal Grandmother      Social History     Tobacco Use    Smoking status: Never Smoker    Smokeless tobacco: Never Used   Substance Use Topics    Alcohol use: No     Frequency: Monthly or less     Drinks per session: 1 or 2     Binge frequency: Never    Drug use: No     Review of Systems   Constitutional: Negative for chills, fatigue and fever.   HENT: Negative for congestion.    Eyes: Negative for visual disturbance.   Respiratory: Negative for cough and shortness of breath.    Cardiovascular: Negative for chest pain and palpitations.   Gastrointestinal: Positive for abdominal pain and diarrhea. Negative for abdominal distention, constipation, nausea and vomiting.   Genitourinary: Negative for difficulty urinating, dysuria, flank pain, hematuria, vaginal bleeding and  vaginal discharge.   Musculoskeletal: Negative for back pain.   Skin: Negative for rash.   Neurological: Negative for dizziness, seizures, light-headedness and headaches.   Hematological: Does not bruise/bleed easily.   Psychiatric/Behavioral: Negative for dysphoric mood. The patient is not nervous/anxious.        Physical Exam     Initial Vitals [12/09/19 2033]   BP Pulse Resp Temp SpO2   122/73 (!) 118 20 98 °F (36.7 °C) 98 %      MAP       --       /73 (BP Location: Left arm, Patient Position: Lying)   Pulse (!) 118   Temp 98 °F (36.7 °C) (Oral)   Resp 20   LMP 05/27/2019 (Exact Date)   SpO2 98%     Physical Exam    Nursing note and vitals reviewed.  Constitutional: She appears well-developed and well-nourished. No distress.   HENT:   Head: Normocephalic and atraumatic.   Eyes: EOM are normal. Pupils are equal, round, and reactive to light.   Neck: Normal range of motion. Neck supple.   Cardiovascular: Normal rate, regular rhythm and normal heart sounds. Exam reveals no friction rub.    No murmur heard.  Pulmonary/Chest: Breath sounds normal. No respiratory distress. She has no wheezes. She has no rales.   Abdominal: Soft. She exhibits no distension. There is no tenderness. There is no rebound and no guarding.   Gravid, size appropriate for dates    Genitourinary: Uterus normal.   Genitourinary Comments: Deferred   Musculoskeletal: She exhibits no edema or tenderness.   Neurological: She is alert and oriented to person, place, and time.   Skin: Skin is warm and dry.   Psychiatric: She has a normal mood and affect. Her behavior is normal. Judgment and thought content normal.       ED Course   Obtain Fetal nonstress test (NST)  Date/Time: 12/9/2019 8:43 PM  Performed by: Yolie Ca MD  Authorized by: Yolie Ca MD     Nonstress Test:     Variability:  6-25 BPM    Decelerations:  Variable (occasional small variables without ctx present)    Accelerations:  10 bpm    Acoustic  Stimulator: No      Baseline:  150    Uterine Irritability: Yes (minimal)      Contractions:  Not present  Biophysical Profile:     Nonstress Test Interpretation: appropriate for gestational age      Overall Impression:  Reassuring  Post-procedure:     Patient tolerance:  Patient tolerated the procedure well with no immediate complications      Labs Reviewed   INFLUENZA A & B BY MOLECULAR   CBC W/ AUTO DIFFERENTIAL   COMPREHENSIVE METABOLIC PANEL          Imaging Results    None          Medical Decision Making:   Initial Assessment:   IUP @ 28.0 with abdominal pain and diarrhea   ED Management:  Diarrhea:   - PO hydration  - CBC with normal WBC, CMP mildly hemolyzed with elevated K+ due to hemolysis, all other elytes normal  - Flu swab negative  - 1L LR  - zofran and phenergan offered; minimal nausea  - suspect GI virus: encouraged PO hydration with water/pedialyte; avoid antidiarrheals at this time  - medically stable for discharge to home at this time  - RTC for prenatal care as scheduled  - RT WADE for new or worsening symptoms   - call Dr. Short clinic if still not well in the next few days  Other:   I have discussed this case with another health care provider.       <> Summary of the Discussion: Plan discussed with staff physician Dr. Zepeda              Attending Attestation:   Physician Attestation Statement for Resident:  As the supervising MD   Physician Attestation Statement: I have personally seen and examined this patient.   I agree with the above history. -:   As the supervising MD I agree with the above PE.    As the supervising MD I agree with the above treatment, course, plan, and disposition.   -: Patient evaluated and found to be stable, agree with resident's assessment and plan.  I was personally present during the critical portions of the procedure(s) performed by the resident and was immediately available in the ED to provide services and assistance as needed during the entire procedure.   I have reviewed and agree with the residents interpretation of the following: lab data.  I have reviewed the following: old records at this facility.                                  Clinical Impression:       ICD-10-CM ICD-9-CM   1. 28 weeks gestation of pregnancy Z3A.28 V22.2   2. Abdominal pain, unspecified abdominal location R10.9 789.00   3. Diarrhea, unspecified type R19.7 787.91         Disposition:   Disposition: Discharged  Condition: Stable                     Yolie Ca MD  Resident  12/09/19 [id number]       Ashley Zepeda MD  12/13/19 [id number]

## 2019-12-10 NOTE — DISCHARGE INSTRUCTIONS
Call clinic [phone] or L & D after hours at [phone] for vaginal bleeding, leakage of fluids, contractions 4-5 in one hour, decreased fetal movements (less than 10 kicks in 2 hours), headache not relieved by Tylenol, blurry vision, or temp of 100.4 or greater.  Begin doing fetal kick counts, at least 10 movements in 2 hours starting at 28 weeks gestation.  Keep next clinic appointment.

## 2019-12-17 ENCOUNTER — PATIENT MESSAGE (OUTPATIENT)
Dept: OBSTETRICS AND GYNECOLOGY | Facility: CLINIC | Age: 29
End: 2019-12-17

## 2019-12-22 ENCOUNTER — PATIENT OUTREACH (OUTPATIENT)
Dept: ADMINISTRATIVE | Facility: OTHER | Age: 29
End: 2019-12-22

## 2019-12-23 NOTE — PROGRESS NOTES
Patient reports good fm, no vaginal bleeding, contractions or rupture of membranes. Overall doing well. Labs reviewed. Questions answered today. Encouraged prenatal classes and lactation visits. Labor, ROM and VB precautions given.  MFM recommendations reviewed

## 2020-01-06 ENCOUNTER — PATIENT MESSAGE (OUTPATIENT)
Dept: OBSTETRICS AND GYNECOLOGY | Facility: CLINIC | Age: 30
End: 2020-01-06

## 2020-01-06 ENCOUNTER — PROCEDURE VISIT (OUTPATIENT)
Dept: MATERNAL FETAL MEDICINE | Facility: CLINIC | Age: 30
End: 2020-01-06
Payer: COMMERCIAL

## 2020-01-06 DIAGNOSIS — N91.2 AMENORRHEA: ICD-10-CM

## 2020-01-06 DIAGNOSIS — Z32.01 POSITIVE PREGNANCY TEST: ICD-10-CM

## 2020-01-06 PROCEDURE — 76816 OB US FOLLOW-UP PER FETUS: CPT | Mod: 26,S$PBB,, | Performed by: PEDIATRICS

## 2020-01-06 PROCEDURE — 76816 OB US FOLLOW-UP PER FETUS: CPT | Mod: PBBFAC | Performed by: PEDIATRICS

## 2020-01-06 PROCEDURE — 76816 PR  US,PREGNANT UTERUS,F/U,TRANSABD APP: ICD-10-PCS | Mod: 26,S$PBB,, | Performed by: PEDIATRICS

## 2020-01-08 ENCOUNTER — PATIENT OUTREACH (OUTPATIENT)
Dept: ADMINISTRATIVE | Facility: OTHER | Age: 30
End: 2020-01-08

## 2020-01-09 ENCOUNTER — ROUTINE PRENATAL (OUTPATIENT)
Dept: OBSTETRICS AND GYNECOLOGY | Facility: CLINIC | Age: 30
End: 2020-01-09
Payer: COMMERCIAL

## 2020-01-09 VITALS — DIASTOLIC BLOOD PRESSURE: 69 MMHG | BODY MASS INDEX: 37.98 KG/M2 | WEIGHT: 242.5 LBS | SYSTOLIC BLOOD PRESSURE: 117 MMHG

## 2020-01-09 DIAGNOSIS — F41.1 GAD (GENERALIZED ANXIETY DISORDER): ICD-10-CM

## 2020-01-09 DIAGNOSIS — Z78.9 BREASTFEEDING (INFANT): ICD-10-CM

## 2020-01-09 DIAGNOSIS — F32.A DEPRESSION DURING PREGNANCY IN THIRD TRIMESTER: ICD-10-CM

## 2020-01-09 DIAGNOSIS — R73.03 PREDIABETES: ICD-10-CM

## 2020-01-09 DIAGNOSIS — R55 SYNCOPE AND COLLAPSE: Primary | ICD-10-CM

## 2020-01-09 DIAGNOSIS — Z34.83 ENCOUNTER FOR SUPERVISION OF OTHER NORMAL PREGNANCY IN THIRD TRIMESTER: ICD-10-CM

## 2020-01-09 DIAGNOSIS — O99.343 DEPRESSION DURING PREGNANCY IN THIRD TRIMESTER: ICD-10-CM

## 2020-01-09 PROCEDURE — 0502F SUBSEQUENT PRENATAL CARE: CPT | Mod: CPTII,S$GLB,, | Performed by: OBSTETRICS & GYNECOLOGY

## 2020-01-09 PROCEDURE — 0502F PR SUBSEQUENT PRENATAL CARE: ICD-10-PCS | Mod: CPTII,S$GLB,, | Performed by: OBSTETRICS & GYNECOLOGY

## 2020-01-09 PROCEDURE — 99999 PR PBB SHADOW E&M-EST. PATIENT-LVL III: ICD-10-PCS | Mod: PBBFAC,,, | Performed by: OBSTETRICS & GYNECOLOGY

## 2020-01-09 PROCEDURE — 99999 PR PBB SHADOW E&M-EST. PATIENT-LVL III: CPT | Mod: PBBFAC,,, | Performed by: OBSTETRICS & GYNECOLOGY

## 2020-01-09 NOTE — PATIENT INSTRUCTIONS
TDAP Vaccine   [facility]  [address] [place] [id number]  [phone]    [email or web address]    Patient counseled on the recommendations for the TDAP vaccine for her and family members in continued direct contact with the infant. Risks and benefits discussed in detail. Patient desires to proceed with vaccination.

## 2020-01-13 NOTE — PROGRESS NOTES
Patient reports good fm, no vaginal bleeding, contractions or rupture of membranes. Overall doing well. Labs reviewed. Questions answered today. Encouraged prenatal classes and lactation visits. Labor, ROM and VB precautions given.

## 2020-01-14 ENCOUNTER — HOSPITAL ENCOUNTER (EMERGENCY)
Facility: OTHER | Age: 30
Discharge: HOME | End: 2020-01-14
Attending: OBSTETRICS & GYNECOLOGY
Payer: COMMERCIAL

## 2020-01-14 VITALS
SYSTOLIC BLOOD PRESSURE: 117 MMHG | TEMPERATURE: 99 F | OXYGEN SATURATION: 100 % | DIASTOLIC BLOOD PRESSURE: 58 MMHG | HEART RATE: 105 BPM

## 2020-01-14 DIAGNOSIS — B37.31 YEAST INFECTION OF THE VAGINA: Primary | ICD-10-CM

## 2020-01-14 DIAGNOSIS — N85.8 UTERINE IRRITABILITY: ICD-10-CM

## 2020-01-14 DIAGNOSIS — Z3A.33 33 WEEKS GESTATION OF PREGNANCY: ICD-10-CM

## 2020-01-14 LAB
BACTERIA #/AREA URNS HPF: ABNORMAL /HPF
BILIRUB UR QL STRIP: NEGATIVE
CANDIDA RRNA VAG QL PROBE: POSITIVE
CLARITY UR: ABNORMAL
COLOR UR: YELLOW
G VAGINALIS RRNA GENITAL QL PROBE: NEGATIVE
GLUCOSE UR QL STRIP: NEGATIVE
HGB UR QL STRIP: NEGATIVE
KETONES UR QL STRIP: ABNORMAL
LEUKOCYTE ESTERASE UR QL STRIP: ABNORMAL
MICROSCOPIC COMMENT: ABNORMAL
NITRITE UR QL STRIP: NEGATIVE
PH UR STRIP: 7 [PH] (ref 5–8)
PROT UR QL STRIP: ABNORMAL
SP GR UR STRIP: 1.02 (ref 1–1.03)
SQUAMOUS #/AREA URNS HPF: 6 /HPF
T VAGINALIS RRNA GENITAL QL PROBE: NEGATIVE
URN SPEC COLLECT METH UR: ABNORMAL
UROBILINOGEN UR STRIP-ACNC: 1 EU/DL
WBC #/AREA URNS HPF: 13 /HPF (ref 0–5)

## 2020-01-14 PROCEDURE — 59025 FETAL NON-STRESS TEST: CPT

## 2020-01-14 PROCEDURE — 99284 EMERGENCY DEPT VISIT MOD MDM: CPT | Mod: 25

## 2020-01-14 PROCEDURE — 87086 URINE CULTURE/COLONY COUNT: CPT

## 2020-01-14 PROCEDURE — 59025 FETAL NON-STRESS TEST: CPT | Mod: 26,,, | Performed by: OBSTETRICS & GYNECOLOGY

## 2020-01-14 PROCEDURE — 59025 PR FETAL 2N-STRESS TEST: ICD-10-PCS | Mod: 26,,, | Performed by: OBSTETRICS & GYNECOLOGY

## 2020-01-14 PROCEDURE — 81000 URINALYSIS NONAUTO W/SCOPE: CPT

## 2020-01-14 PROCEDURE — 87510 GARDNER VAG DNA DIR PROBE: CPT

## 2020-01-14 PROCEDURE — 87480 CANDIDA DNA DIR PROBE: CPT

## 2020-01-14 PROCEDURE — 99284 EMERGENCY DEPT VISIT MOD MDM: CPT | Mod: 25,,, | Performed by: OBSTETRICS & GYNECOLOGY

## 2020-01-14 PROCEDURE — 99284 PR EMERGENCY DEPT VISIT,LEVEL IV: ICD-10-PCS | Mod: 25,,, | Performed by: OBSTETRICS & GYNECOLOGY

## 2020-01-14 RX ORDER — ONDANSETRON 4 MG/1
4 TABLET, ORALLY DISINTEGRATING ORAL ONCE
Status: DISCONTINUED | OUTPATIENT
Start: 2020-01-14 | End: 2020-01-15 | Stop reason: HOSPADM

## 2020-01-14 RX ORDER — ACETAMINOPHEN 500 MG
1000 TABLET ORAL ONCE
Status: DISCONTINUED | OUTPATIENT
Start: 2020-01-14 | End: 2020-01-15 | Stop reason: HOSPADM

## 2020-01-14 RX ORDER — FLUCONAZOLE 150 MG/1
150 TABLET ORAL ONCE
Qty: 1 TABLET | Refills: 0 | Status: SHIPPED | OUTPATIENT
Start: 2020-01-14 | End: 2020-01-14

## 2020-01-15 LAB
BACTERIA UR CULT: NORMAL
BACTERIA UR CULT: NORMAL

## 2020-01-15 NOTE — DISCHARGE INSTRUCTIONS
Call L&D @ [phone] if contractions every 5-10 minutes x 2 hours, decreased fetal movement, vaginal bleeding (soaking a maxipad in an hour), or if your water bag breaks.  If you start maya, hydrate well with water (2-3 bottles of water) while timing the contractions.

## 2020-01-16 ENCOUNTER — TELEPHONE (OUTPATIENT)
Dept: OBSTETRICS AND GYNECOLOGY | Facility: CLINIC | Age: 30
End: 2020-01-16

## 2020-01-16 ENCOUNTER — PATIENT MESSAGE (OUTPATIENT)
Dept: OBSTETRICS AND GYNECOLOGY | Facility: CLINIC | Age: 30
End: 2020-01-16

## 2020-01-16 DIAGNOSIS — O92.70 LACTATION DISORDER: Primary | ICD-10-CM

## 2020-01-22 ENCOUNTER — PATIENT OUTREACH (OUTPATIENT)
Dept: ADMINISTRATIVE | Facility: OTHER | Age: 30
End: 2020-01-22

## 2020-01-23 ENCOUNTER — PATIENT MESSAGE (OUTPATIENT)
Dept: OBSTETRICS AND GYNECOLOGY | Facility: CLINIC | Age: 30
End: 2020-01-23

## 2020-01-23 ENCOUNTER — ROUTINE PRENATAL (OUTPATIENT)
Dept: OBSTETRICS AND GYNECOLOGY | Facility: CLINIC | Age: 30
End: 2020-01-23
Payer: COMMERCIAL

## 2020-01-23 VITALS
WEIGHT: 246.94 LBS | DIASTOLIC BLOOD PRESSURE: 77 MMHG | SYSTOLIC BLOOD PRESSURE: 126 MMHG | BODY MASS INDEX: 38.67 KG/M2

## 2020-01-23 DIAGNOSIS — R45.89 DEPRESSED MOOD: ICD-10-CM

## 2020-01-23 DIAGNOSIS — Z34.80 SUPERVISION OF OTHER NORMAL PREGNANCY: Primary | ICD-10-CM

## 2020-01-23 PROCEDURE — 99999 PR PBB SHADOW E&M-EST. PATIENT-LVL III: CPT | Mod: PBBFAC,,, | Performed by: OBSTETRICS & GYNECOLOGY

## 2020-01-23 PROCEDURE — 99999 PR PBB SHADOW E&M-EST. PATIENT-LVL III: ICD-10-PCS | Mod: PBBFAC,,, | Performed by: OBSTETRICS & GYNECOLOGY

## 2020-01-23 PROCEDURE — 0502F PR SUBSEQUENT PRENATAL CARE: ICD-10-PCS | Mod: CPTII,S$GLB,, | Performed by: OBSTETRICS & GYNECOLOGY

## 2020-01-23 PROCEDURE — 0502F SUBSEQUENT PRENATAL CARE: CPT | Mod: CPTII,S$GLB,, | Performed by: OBSTETRICS & GYNECOLOGY

## 2020-01-28 ENCOUNTER — PATIENT MESSAGE (OUTPATIENT)
Dept: OBSTETRICS AND GYNECOLOGY | Facility: CLINIC | Age: 30
End: 2020-01-28

## 2020-01-28 ENCOUNTER — CLINICAL SUPPORT (OUTPATIENT)
Dept: OBSTETRICS AND GYNECOLOGY | Facility: CLINIC | Age: 30
End: 2020-01-28
Payer: COMMERCIAL

## 2020-01-28 DIAGNOSIS — Z23 NEED FOR DIPHTHERIA-TETANUS-PERTUSSIS (TDAP) VACCINE: Primary | ICD-10-CM

## 2020-01-28 PROCEDURE — 90471 IMMUNIZATION ADMIN: CPT | Mod: S$GLB,,, | Performed by: OBSTETRICS & GYNECOLOGY

## 2020-01-28 PROCEDURE — 99999 PR PBB SHADOW E&M-EST. PATIENT-LVL I: ICD-10-PCS | Mod: PBBFAC,,,

## 2020-01-28 PROCEDURE — 90715 TDAP VACCINE 7 YRS/> IM: CPT | Mod: S$GLB,,, | Performed by: OBSTETRICS & GYNECOLOGY

## 2020-01-28 PROCEDURE — 90471 TDAP VACCINE GREATER THAN OR EQUAL TO 7YO IM: ICD-10-PCS | Mod: S$GLB,,, | Performed by: OBSTETRICS & GYNECOLOGY

## 2020-01-28 PROCEDURE — 99999 PR PBB SHADOW E&M-EST. PATIENT-LVL I: CPT | Mod: PBBFAC,,,

## 2020-01-28 PROCEDURE — 90715 TDAP VACCINE GREATER THAN OR EQUAL TO 7YO IM: ICD-10-PCS | Mod: S$GLB,,, | Performed by: OBSTETRICS & GYNECOLOGY

## 2020-01-28 NOTE — PROGRESS NOTES
Here for TDAP injection. Patient without complaint of pain at this time, Injection given. Tolerated well. No pain noted after injection.  Advised to wait in lobby 15 minutes and report any adverse reactions.     Site:  LD    Baby Friendly Handout Given to Patient

## 2020-02-01 NOTE — PROGRESS NOTES
Patient reports good fm, no vaginal bleeding, contractions or rupture of membranes. Overall doing well. Labs reviewed. Questions answered today. Encouraged prenatal classes and lactation visits. Labor, ROM and VB precautions given.  Seen with mother and  today.  Patient does report some feeling of desire to be along.  She reports that she has enough energy to interact with people at work.  However when she gets home she is she has a strong desire to be by herself.  She denies any crying spells or thoughts of hurting herself or anyone else.  We discussed the possibility of getting in to see a therapist soon and patient does agree with this plan.

## 2020-02-03 ENCOUNTER — PATIENT OUTREACH (OUTPATIENT)
Dept: ADMINISTRATIVE | Facility: OTHER | Age: 30
End: 2020-02-03

## 2020-02-04 ENCOUNTER — ROUTINE PRENATAL (OUTPATIENT)
Dept: OBSTETRICS AND GYNECOLOGY | Facility: CLINIC | Age: 30
End: 2020-02-04
Payer: COMMERCIAL

## 2020-02-04 ENCOUNTER — TELEPHONE (OUTPATIENT)
Dept: OBSTETRICS AND GYNECOLOGY | Facility: CLINIC | Age: 30
End: 2020-02-04

## 2020-02-04 ENCOUNTER — LAB VISIT (OUTPATIENT)
Dept: LAB | Facility: OTHER | Age: 30
End: 2020-02-04
Attending: NURSE PRACTITIONER
Payer: COMMERCIAL

## 2020-02-04 VITALS
WEIGHT: 250.88 LBS | DIASTOLIC BLOOD PRESSURE: 76 MMHG | BODY MASS INDEX: 39.29 KG/M2 | SYSTOLIC BLOOD PRESSURE: 120 MMHG

## 2020-02-04 DIAGNOSIS — Z34.80 SUPERVISION OF OTHER NORMAL PREGNANCY, ANTEPARTUM: Primary | ICD-10-CM

## 2020-02-04 DIAGNOSIS — Z36.89 ENCOUNTER FOR ULTRASOUND TO CHECK FETAL GROWTH: ICD-10-CM

## 2020-02-04 DIAGNOSIS — O99.019 ANTEPARTUM ANEMIA: Primary | ICD-10-CM

## 2020-02-04 DIAGNOSIS — Z34.80 SUPERVISION OF OTHER NORMAL PREGNANCY, ANTEPARTUM: ICD-10-CM

## 2020-02-04 LAB
BASOPHILS # BLD AUTO: 0.02 K/UL (ref 0–0.2)
BASOPHILS NFR BLD: 0.4 % (ref 0–1.9)
DIFFERENTIAL METHOD: ABNORMAL
EOSINOPHIL # BLD AUTO: 0.1 K/UL (ref 0–0.5)
EOSINOPHIL NFR BLD: 2.1 % (ref 0–8)
ERYTHROCYTE [DISTWIDTH] IN BLOOD BY AUTOMATED COUNT: 15.8 % (ref 11.5–14.5)
HCT VFR BLD AUTO: 35.7 % (ref 37–48.5)
HGB BLD-MCNC: 10.8 G/DL (ref 12–16)
IMM GRANULOCYTES # BLD AUTO: 0.01 K/UL (ref 0–0.04)
IMM GRANULOCYTES NFR BLD AUTO: 0.2 % (ref 0–0.5)
LYMPHOCYTES # BLD AUTO: 1.3 K/UL (ref 1–4.8)
LYMPHOCYTES NFR BLD: 23.2 % (ref 18–48)
MCH RBC QN AUTO: 24.5 PG (ref 27–31)
MCHC RBC AUTO-ENTMCNC: 30.3 G/DL (ref 32–36)
MCV RBC AUTO: 81 FL (ref 82–98)
MONOCYTES # BLD AUTO: 0.5 K/UL (ref 0.3–1)
MONOCYTES NFR BLD: 9.3 % (ref 4–15)
NEUTROPHILS # BLD AUTO: 3.7 K/UL (ref 1.8–7.7)
NEUTROPHILS NFR BLD: 64.8 % (ref 38–73)
NRBC BLD-RTO: 0 /100 WBC
PLATELET # BLD AUTO: 321 K/UL (ref 150–350)
PMV BLD AUTO: 10.2 FL (ref 9.2–12.9)
RBC # BLD AUTO: 4.41 M/UL (ref 4–5.4)
WBC # BLD AUTO: 5.69 K/UL (ref 3.9–12.7)

## 2020-02-04 PROCEDURE — 0502F SUBSEQUENT PRENATAL CARE: CPT | Mod: CPTII,S$GLB,, | Performed by: NURSE PRACTITIONER

## 2020-02-04 PROCEDURE — 86703 HIV-1/HIV-2 1 RESULT ANTBDY: CPT

## 2020-02-04 PROCEDURE — 99999 PR PBB SHADOW E&M-EST. PATIENT-LVL II: CPT | Mod: PBBFAC,,, | Performed by: NURSE PRACTITIONER

## 2020-02-04 PROCEDURE — 85025 COMPLETE CBC W/AUTO DIFF WBC: CPT

## 2020-02-04 PROCEDURE — 99999 PR PBB SHADOW E&M-EST. PATIENT-LVL II: ICD-10-PCS | Mod: PBBFAC,,, | Performed by: NURSE PRACTITIONER

## 2020-02-04 PROCEDURE — 36415 COLL VENOUS BLD VENIPUNCTURE: CPT

## 2020-02-04 PROCEDURE — 0502F PR SUBSEQUENT PRENATAL CARE: ICD-10-PCS | Mod: CPTII,S$GLB,, | Performed by: NURSE PRACTITIONER

## 2020-02-04 PROCEDURE — 86592 SYPHILIS TEST NON-TREP QUAL: CPT

## 2020-02-04 PROCEDURE — 87081 CULTURE SCREEN ONLY: CPT

## 2020-02-04 RX ORDER — IRON POLYSACCHARIDE COMPLEX 150 MG
150 CAPSULE ORAL DAILY
Qty: 30 CAPSULE | Refills: 6 | Status: SHIPPED | OUTPATIENT
Start: 2020-02-04 | End: 2020-03-05

## 2020-02-04 NOTE — TELEPHONE ENCOUNTER
----- Message from Radha Short MD sent at 2/1/2020  3:26 PM CST -----  Raymundo King. Can I ask a favor. This patient needs to get in to see someone this week for some feelings of possible depression and evaluation.  She is 35 weeks pregnant and has some underlying medical conditions as well. I am out this week. Can you contact Ebony at extension [phone]  or by Skype in reference to coordinating an appointment for this patient.    Thanks very much,   Radha Short M.D.  Obstetrics and Gynecology

## 2020-02-04 NOTE — LETTER
February 4, 2020      [facility] [id number] [facility] 6 [facility] 640  [address] [address]  Phone: [phone]  Fax: [phone]       Patient: Dora Hernandez   YOB: 1990  Date of Visit: 02/04/2020    To Whom It May Concern:    Charmaine Hernandez  was at [facility] on 02/04/2020. She is currently pregnant with the estimated due date of 3/3/20.  Please excuse her from work from 1/28/2020 until she has 12 weeks postpartum due to neurocardiogenic syncope. If you have any questions or concerns, or if I can be of further assistance, please do not hesitate to contact me.    Sincerely,    TATIANA Gottlieb

## 2020-02-04 NOTE — PROGRESS NOTES
Here for routine OB appt at 36w1d, with complaints of pelvic pressure.  Reports the pressure improves with rest.  Has last syncopal episode on 1/28/20.  She stopped working after that episode.   Reports good FM.  Denies LOF, denies VB, denies contractions.  Reviewed warning signs of Labor and Preeclampsia.  Daily FM counts reinforced.  Peds- Dr. Bhatti.  Plans to breastfeed- has pump.   GBS/ T3 labs today. MFM scan ordered for growth.   F/U scheduled 1 week

## 2020-02-05 LAB
HIV 1+2 AB+HIV1 P24 AG SERPL QL IA: NEGATIVE
RPR SER QL: NORMAL

## 2020-02-06 ENCOUNTER — ROUTINE PRENATAL (OUTPATIENT)
Dept: OBSTETRICS AND GYNECOLOGY | Facility: CLINIC | Age: 30
End: 2020-02-06
Payer: COMMERCIAL

## 2020-02-06 ENCOUNTER — PROCEDURE VISIT (OUTPATIENT)
Dept: MATERNAL FETAL MEDICINE | Facility: CLINIC | Age: 30
End: 2020-02-06
Payer: COMMERCIAL

## 2020-02-06 VITALS
BODY MASS INDEX: 38.67 KG/M2 | DIASTOLIC BLOOD PRESSURE: 70 MMHG | SYSTOLIC BLOOD PRESSURE: 112 MMHG | WEIGHT: 246.94 LBS

## 2020-02-06 DIAGNOSIS — Z3A.36 36 WEEKS GESTATION OF PREGNANCY: Primary | ICD-10-CM

## 2020-02-06 DIAGNOSIS — R19.7 DIARRHEA DURING PREGNANCY: ICD-10-CM

## 2020-02-06 DIAGNOSIS — Z36.89 ENCOUNTER FOR ULTRASOUND TO CHECK FETAL GROWTH: ICD-10-CM

## 2020-02-06 DIAGNOSIS — O40.3XX0 POLYHYDRAMNIOS IN THIRD TRIMESTER COMPLICATION, SINGLE OR UNSPECIFIED FETUS: ICD-10-CM

## 2020-02-06 DIAGNOSIS — O26.899 PELVIC PRESSURE IN PREGNANCY: ICD-10-CM

## 2020-02-06 DIAGNOSIS — R10.2 PELVIC PRESSURE IN PREGNANCY: ICD-10-CM

## 2020-02-06 DIAGNOSIS — O26.899 DIARRHEA DURING PREGNANCY: ICD-10-CM

## 2020-02-06 LAB — BACTERIA SPEC AEROBE CULT: NORMAL

## 2020-02-06 PROCEDURE — 76816 OB US FOLLOW-UP PER FETUS: CPT | Mod: S$GLB,,, | Performed by: OBSTETRICS & GYNECOLOGY

## 2020-02-06 PROCEDURE — 0502F PR SUBSEQUENT PRENATAL CARE: ICD-10-PCS | Mod: CPTII,S$GLB,, | Performed by: NURSE PRACTITIONER

## 2020-02-06 PROCEDURE — 99999 PR PBB SHADOW E&M-EST. PATIENT-LVL II: CPT | Mod: PBBFAC,,, | Performed by: NURSE PRACTITIONER

## 2020-02-06 PROCEDURE — 0502F SUBSEQUENT PRENATAL CARE: CPT | Mod: CPTII,S$GLB,, | Performed by: NURSE PRACTITIONER

## 2020-02-06 PROCEDURE — 99999 PR PBB SHADOW E&M-EST. PATIENT-LVL II: ICD-10-PCS | Mod: PBBFAC,,, | Performed by: NURSE PRACTITIONER

## 2020-02-06 PROCEDURE — 76816 PR  US,PREGNANT UTERUS,F/U,TRANSABD APP: ICD-10-PCS | Mod: S$GLB,,, | Performed by: OBSTETRICS & GYNECOLOGY

## 2020-02-06 NOTE — PROGRESS NOTES
Presents to the [facility] at 36w3d gestation for evaluation of pelvic pressure and diarrhea x 2 days. Excellent FM. Denies VB, LOF, ctx. Denies preE symptoms. Pelvic pressure that worsens with walking, sitting; improves with rest. Several episodes of loose stools in the past 2 days, denies fever/chills, blood/mucus in stool, n/v, weakness. No recent syncopal episodes.     VS: 112/70, HR 86, RR 16, T 98.8 F  Cervical check: 0/30%/-4    Reviewed s/s of labor. FKC reinforced. Increased hydration, bland diet, OTC Imodium, Kaopectate for diarrhea, instructed to present to OB ED if diarrhea persist beyond 24 hours or new onset symptoms, verbalized understanding. FU in 1 week with Dr. Jeansonne. FU at [facility] prn.

## 2020-02-07 ENCOUNTER — PATIENT MESSAGE (OUTPATIENT)
Dept: OBSTETRICS AND GYNECOLOGY | Facility: CLINIC | Age: 30
End: 2020-02-07

## 2020-02-09 ENCOUNTER — PATIENT OUTREACH (OUTPATIENT)
Dept: ADMINISTRATIVE | Facility: OTHER | Age: 30
End: 2020-02-09

## 2020-02-10 ENCOUNTER — ROUTINE PRENATAL (OUTPATIENT)
Dept: OBSTETRICS AND GYNECOLOGY | Facility: CLINIC | Age: 30
End: 2020-02-10
Payer: COMMERCIAL

## 2020-02-10 VITALS
DIASTOLIC BLOOD PRESSURE: 84 MMHG | BODY MASS INDEX: 39.36 KG/M2 | WEIGHT: 251.31 LBS | SYSTOLIC BLOOD PRESSURE: 130 MMHG

## 2020-02-10 DIAGNOSIS — Z34.83 ENCOUNTER FOR SUPERVISION OF OTHER NORMAL PREGNANCY IN THIRD TRIMESTER: Primary | ICD-10-CM

## 2020-02-10 PROCEDURE — 99999 PR PBB SHADOW E&M-EST. PATIENT-LVL II: ICD-10-PCS | Mod: PBBFAC,,, | Performed by: OBSTETRICS & GYNECOLOGY

## 2020-02-10 PROCEDURE — 0502F SUBSEQUENT PRENATAL CARE: CPT | Mod: CPTII,S$GLB,, | Performed by: OBSTETRICS & GYNECOLOGY

## 2020-02-10 PROCEDURE — 0502F PR SUBSEQUENT PRENATAL CARE: ICD-10-PCS | Mod: CPTII,S$GLB,, | Performed by: OBSTETRICS & GYNECOLOGY

## 2020-02-10 PROCEDURE — 99999 PR PBB SHADOW E&M-EST. PATIENT-LVL II: CPT | Mod: PBBFAC,,, | Performed by: OBSTETRICS & GYNECOLOGY

## 2020-02-14 ENCOUNTER — PATIENT MESSAGE (OUTPATIENT)
Dept: OBSTETRICS AND GYNECOLOGY | Facility: CLINIC | Age: 30
End: 2020-02-14

## 2020-02-18 ENCOUNTER — ROUTINE PRENATAL (OUTPATIENT)
Dept: OBSTETRICS AND GYNECOLOGY | Facility: CLINIC | Age: 30
End: 2020-02-18
Payer: COMMERCIAL

## 2020-02-18 ENCOUNTER — TELEPHONE (OUTPATIENT)
Dept: OBSTETRICS AND GYNECOLOGY | Facility: HOSPITAL | Age: 30
End: 2020-02-18

## 2020-02-18 VITALS — BODY MASS INDEX: 39.71 KG/M2 | SYSTOLIC BLOOD PRESSURE: 138 MMHG | DIASTOLIC BLOOD PRESSURE: 74 MMHG | WEIGHT: 253.5 LBS

## 2020-02-18 DIAGNOSIS — Z3A.38 38 WEEKS GESTATION OF PREGNANCY: ICD-10-CM

## 2020-02-18 DIAGNOSIS — R30.0 DYSURIA: ICD-10-CM

## 2020-02-18 DIAGNOSIS — O16.3 ELEVATED BLOOD PRESSURE AFFECTING PREGNANCY IN THIRD TRIMESTER, ANTEPARTUM: ICD-10-CM

## 2020-02-18 DIAGNOSIS — O40.3XX0 POLYHYDRAMNIOS AFFECTING PREGNANCY IN THIRD TRIMESTER: ICD-10-CM

## 2020-02-18 DIAGNOSIS — N89.8 VAGINAL DISCHARGE: Primary | ICD-10-CM

## 2020-02-18 DIAGNOSIS — Z34.80 SUPERVISION OF OTHER NORMAL PREGNANCY: Primary | ICD-10-CM

## 2020-02-18 PROCEDURE — 0502F SUBSEQUENT PRENATAL CARE: CPT | Mod: CPTII,S$GLB,, | Performed by: NURSE PRACTITIONER

## 2020-02-18 PROCEDURE — 87086 URINE CULTURE/COLONY COUNT: CPT

## 2020-02-18 PROCEDURE — 87481 CANDIDA DNA AMP PROBE: CPT | Mod: 59

## 2020-02-18 PROCEDURE — 87661 TRICHOMONAS VAGINALIS AMPLIF: CPT

## 2020-02-18 PROCEDURE — 99999 PR PBB SHADOW E&M-EST. PATIENT-LVL III: CPT | Mod: PBBFAC,,, | Performed by: NURSE PRACTITIONER

## 2020-02-18 PROCEDURE — 84156 ASSAY OF PROTEIN URINE: CPT

## 2020-02-18 PROCEDURE — 0502F PR SUBSEQUENT PRENATAL CARE: ICD-10-PCS | Mod: CPTII,S$GLB,, | Performed by: NURSE PRACTITIONER

## 2020-02-18 PROCEDURE — 99999 PR PBB SHADOW E&M-EST. PATIENT-LVL III: ICD-10-PCS | Mod: PBBFAC,,, | Performed by: NURSE PRACTITIONER

## 2020-02-18 RX ORDER — FLUCONAZOLE 150 MG/1
150 TABLET ORAL ONCE
Qty: 2 TABLET | Refills: 1 | Status: SHIPPED | OUTPATIENT
Start: 2020-02-18 | End: 2020-02-18

## 2020-02-18 NOTE — TELEPHONE ENCOUNTER
Spoke with patient in detail. Patient with polyhydramnios, Plan induction during the 39th week, scheduled on 2/26.   Patient reports good fm, no vaginal bleeding, occasional contractions, no rupture of membranes. Overall doing well. US reviewed. Questions answered today. Labor, ROM and VB precautions given. Patient denies headaches, blurry vision, chest pain, shortness of breath or right upper quadrant pain.

## 2020-02-18 NOTE — PROGRESS NOTES
Here for routine OB appt at 38w1d, with no complaints.  Reports good FM.  Denies LOF, denies VB, denies contractions.  No recent syncopal episodes.  Last US done 2/4/20 polyhydramnios noted, JUNAID 28. Will message Dr. Johnson in regards to possible IOL.  GBS negative.  Yeast noted on exam, rx diflucan 150 mg. Affirm sent.  Urine dip + for ketones, protein, glucose. Will send for culture and P/c ratio given mildly elevated BP. Pt denies s/s pre-e.    Reviewed warning signs of Labor and Preeclampsia.  Daily FM counts reinforced.  F/U scheduled 1 week with Dr. Short.

## 2020-02-19 ENCOUNTER — HOSPITAL ENCOUNTER (OUTPATIENT)
Dept: PERINATAL CARE | Facility: OTHER | Age: 30
Discharge: HOME | End: 2020-02-19
Attending: OBSTETRICS & GYNECOLOGY
Payer: COMMERCIAL

## 2020-02-19 DIAGNOSIS — O40.3XX0 POLYHYDRAMNIOS AFFECTING PREGNANCY IN THIRD TRIMESTER: ICD-10-CM

## 2020-02-19 DIAGNOSIS — Z34.80 SUPERVISION OF OTHER NORMAL PREGNANCY: ICD-10-CM

## 2020-02-19 LAB
BACTERIA UR CULT: NORMAL
BACTERIA UR CULT: NORMAL
BACTERIAL VAGINOSIS DNA: NEGATIVE
CANDIDA GLABRATA DNA: NEGATIVE
CANDIDA KRUSEI DNA: NEGATIVE
CANDIDA RRNA VAG QL PROBE: POSITIVE
CREAT UR-MCNC: 66 MG/DL (ref 15–325)
PROT UR-MCNC: 9 MG/DL (ref 0–15)
PROT/CREAT UR: 0.14 MG/G{CREAT} (ref 0–0.2)
T VAGINALIS RRNA GENITAL QL PROBE: NEGATIVE

## 2020-02-19 PROCEDURE — 76818 FETAL BIOPHYS PROFILE W/NST: CPT

## 2020-02-19 PROCEDURE — 76818 FETAL BIOPHYS PROFILE W/NST: CPT | Mod: 26,,, | Performed by: OBSTETRICS & GYNECOLOGY

## 2020-02-19 PROCEDURE — 76818 PR US, OB, FETAL BIOPHYSICAL, W/NST: ICD-10-PCS | Mod: 26,,, | Performed by: OBSTETRICS & GYNECOLOGY

## 2020-02-19 NOTE — TELEPHONE ENCOUNTER
----- Message from Brittaney Thornton NP sent at 2/18/2020 12:13 PM CST -----  Regarding: possible IOL?  Hi!    I saw your OB pt at 38w1d for POOJA visit and also noticed that on her growth US she has poly- JUNAID 28.6. She was 4/80/-2 when I checked her today. She is miserable and is wondering about an induction. Would you recommend that she be induced sooner rather than later due to the poly? If not would you like weekly PNT? Thank you so much for any help!    Brittaney Thornton NP

## 2020-02-20 ENCOUNTER — TELEPHONE (OUTPATIENT)
Dept: OBSTETRICS AND GYNECOLOGY | Facility: CLINIC | Age: 30
End: 2020-02-20

## 2020-02-20 ENCOUNTER — PATIENT MESSAGE (OUTPATIENT)
Dept: OBSTETRICS AND GYNECOLOGY | Facility: CLINIC | Age: 30
End: 2020-02-20

## 2020-02-20 NOTE — TELEPHONE ENCOUNTER
----- Message from Dora Martinez sent at 2/20/2020 10:32 AM CST -----  Contact: pt    Name of Who is Calling:BILL VICTORIA [[id number]]    What is the request in detail: Patient states the date is incorrect on the [facility] paperwork , patient states the date should 01/29/2020 . Patient would like her full 12 weeks . Patient like to return mid April 2020 Please contact to further discuss and advise    Can the clinic reply by [facility]: yes       What Number to Call Back if not in [facility]: [phone]

## 2020-02-20 NOTE — TELEPHONE ENCOUNTER
Pt states that she needs forms to say she was out of work since 01/29/2020. And need this updated to send off.

## 2020-02-24 ENCOUNTER — PATIENT OUTREACH (OUTPATIENT)
Dept: ADMINISTRATIVE | Facility: OTHER | Age: 30
End: 2020-02-24

## 2020-02-24 ENCOUNTER — ANESTHESIA EVENT (OUTPATIENT)
Dept: OBSTETRICS AND GYNECOLOGY | Facility: OTHER | Age: 30
End: 2020-02-24
Payer: COMMERCIAL

## 2020-02-24 ENCOUNTER — ANESTHESIA (OUTPATIENT)
Dept: OBSTETRICS AND GYNECOLOGY | Facility: OTHER | Age: 30
End: 2020-02-24
Payer: COMMERCIAL

## 2020-02-24 ENCOUNTER — ROUTINE PRENATAL (OUTPATIENT)
Dept: OBSTETRICS AND GYNECOLOGY | Facility: CLINIC | Age: 30
End: 2020-02-24
Payer: COMMERCIAL

## 2020-02-24 ENCOUNTER — HOSPITAL ENCOUNTER (INPATIENT)
Facility: OTHER | Age: 30
LOS: 2 days | Discharge: HOME | End: 2020-02-26
Attending: OBSTETRICS & GYNECOLOGY | Admitting: OBSTETRICS & GYNECOLOGY
Payer: COMMERCIAL

## 2020-02-24 ENCOUNTER — HOSPITAL ENCOUNTER (OUTPATIENT)
Dept: PERINATAL CARE | Facility: OTHER | Age: 30
Discharge: HOME | End: 2020-02-24
Attending: OBSTETRICS & GYNECOLOGY
Payer: COMMERCIAL

## 2020-02-24 VITALS — DIASTOLIC BLOOD PRESSURE: 82 MMHG | SYSTOLIC BLOOD PRESSURE: 128 MMHG | WEIGHT: 252 LBS | BODY MASS INDEX: 39.47 KG/M2

## 2020-02-24 DIAGNOSIS — O40.3XX1 POLYHYDRAMNIOS IN THIRD TRIMESTER COMPLICATION, FETUS 1 OF MULTIPLE GESTATION: ICD-10-CM

## 2020-02-24 DIAGNOSIS — R55 SYNCOPE AND COLLAPSE: ICD-10-CM

## 2020-02-24 DIAGNOSIS — Z34.80 SUPERVISION OF OTHER NORMAL PREGNANCY: ICD-10-CM

## 2020-02-24 DIAGNOSIS — F41.1 GAD (GENERALIZED ANXIETY DISORDER): Primary | ICD-10-CM

## 2020-02-24 DIAGNOSIS — Z34.90 ENCOUNTER FOR ELECTIVE INDUCTION OF LABOR: ICD-10-CM

## 2020-02-24 DIAGNOSIS — Z34.83 ENCOUNTER FOR SUPERVISION OF OTHER NORMAL PREGNANCY IN THIRD TRIMESTER: ICD-10-CM

## 2020-02-24 DIAGNOSIS — O40.3XX0 POLYHYDRAMNIOS AFFECTING PREGNANCY IN THIRD TRIMESTER: ICD-10-CM

## 2020-02-24 LAB
ABO + RH BLD: NORMAL
ALLENS TEST: ABNORMAL
ALLENS TEST: ABNORMAL
BASOPHILS # BLD AUTO: 0.02 K/UL (ref 0–0.2)
BASOPHILS NFR BLD: 0.4 % (ref 0–1.9)
BLD GP AB SCN CELLS X3 SERPL QL: NORMAL
DELSYS: ABNORMAL
DELSYS: ABNORMAL
DIFFERENTIAL METHOD: ABNORMAL
EOSINOPHIL # BLD AUTO: 0.1 K/UL (ref 0–0.5)
EOSINOPHIL NFR BLD: 2.6 % (ref 0–8)
ERYTHROCYTE [DISTWIDTH] IN BLOOD BY AUTOMATED COUNT: 17.5 % (ref 11.5–14.5)
ERYTHROCYTE [SEDIMENTATION RATE] IN BLOOD BY WESTERGREN METHOD: 0 MM/H
ERYTHROCYTE [SEDIMENTATION RATE] IN BLOOD BY WESTERGREN METHOD: 0 MM/H
FIO2: 0
FIO2: 0
FLOW: 0
FLOW: 0
HCO3 UR-SCNC: 21 MMOL/L (ref 24–28)
HCO3 UR-SCNC: 23.8 MMOL/L (ref 24–28)
HCT VFR BLD AUTO: 39.2 % (ref 37–48.5)
HGB BLD-MCNC: 12.1 G/DL (ref 12–16)
IMM GRANULOCYTES # BLD AUTO: 0.01 K/UL (ref 0–0.04)
IMM GRANULOCYTES NFR BLD AUTO: 0.2 % (ref 0–0.5)
LYMPHOCYTES # BLD AUTO: 1.3 K/UL (ref 1–4.8)
LYMPHOCYTES NFR BLD: 24.1 % (ref 18–48)
MCH RBC QN AUTO: 24.8 PG (ref 27–31)
MCHC RBC AUTO-ENTMCNC: 30.9 G/DL (ref 32–36)
MCV RBC AUTO: 81 FL (ref 82–98)
MODE: ABNORMAL
MODE: ABNORMAL
MONOCYTES # BLD AUTO: 0.5 K/UL (ref 0.3–1)
MONOCYTES NFR BLD: 9.1 % (ref 4–15)
NEUTROPHILS # BLD AUTO: 3.4 K/UL (ref 1.8–7.7)
NEUTROPHILS NFR BLD: 63.6 % (ref 38–73)
NRBC BLD-RTO: 0 /100 WBC
PCO2 BLDA: 51.1 MMHG (ref 35–45)
PCO2 BLDA: 61.9 MMHG (ref 35–45)
PH SMN: 7.19 [PH] (ref 7.35–7.45)
PH SMN: 7.22 [PH] (ref 7.35–7.45)
PLATELET # BLD AUTO: 311 K/UL (ref 150–350)
PMV BLD AUTO: 9.9 FL (ref 9.2–12.9)
PO2 BLDA: 13 MMHG (ref 80–100)
PO2 BLDA: 17 MMHG (ref 80–100)
POC BE: -4 MMOL/L
POC BE: -7 MMOL/L
POC SATURATED O2: 10 % (ref 95–100)
POC SATURATED O2: 17 % (ref 95–100)
RBC # BLD AUTO: 4.87 M/UL (ref 4–5.4)
SAMPLE: ABNORMAL
SAMPLE: ABNORMAL
SITE: ABNORMAL
SITE: ABNORMAL
SP02: 0
SP02: 0
WBC # BLD AUTO: 5.36 K/UL (ref 3.9–12.7)

## 2020-02-24 PROCEDURE — 25000003 PHARM REV CODE 250: Performed by: STUDENT IN AN ORGANIZED HEALTH CARE EDUCATION/TRAINING PROGRAM

## 2020-02-24 PROCEDURE — 86901 BLOOD TYPING SEROLOGIC RH(D): CPT

## 2020-02-24 PROCEDURE — 11000001 HC ACUTE MED/SURG PRIVATE ROOM

## 2020-02-24 PROCEDURE — C1751 CATH, INF, PER/CENT/MIDLINE: HCPCS | Performed by: ANESTHESIOLOGY

## 2020-02-24 PROCEDURE — 99900035 HC TECH TIME PER 15 MIN (STAT)

## 2020-02-24 PROCEDURE — 0502F PR SUBSEQUENT PRENATAL CARE: ICD-10-PCS | Mod: CPTII,S$GLB,, | Performed by: OBSTETRICS & GYNECOLOGY

## 2020-02-24 PROCEDURE — 72100003 HC LABOR CARE, EA. ADDL. 8 HRS

## 2020-02-24 PROCEDURE — 99999 PR PBB SHADOW E&M-EST. PATIENT-LVL II: CPT | Mod: PBBFAC,,, | Performed by: OBSTETRICS & GYNECOLOGY

## 2020-02-24 PROCEDURE — 59025 FETAL NON-STRESS TEST: CPT

## 2020-02-24 PROCEDURE — 82803 BLOOD GASES ANY COMBINATION: CPT

## 2020-02-24 PROCEDURE — 63600175 PHARM REV CODE 636 W HCPCS: Performed by: OBSTETRICS & GYNECOLOGY

## 2020-02-24 PROCEDURE — 99999 PR PBB SHADOW E&M-EST. PATIENT-LVL II: ICD-10-PCS | Mod: PBBFAC,,, | Performed by: OBSTETRICS & GYNECOLOGY

## 2020-02-24 PROCEDURE — 72200005 HC VAGINAL DELIVERY LEVEL II

## 2020-02-24 PROCEDURE — 59409 PRA ETRICAL CARE,VAG DELIV ONLY: ICD-10-PCS | Mod: QY,,, | Performed by: ANESTHESIOLOGY

## 2020-02-24 PROCEDURE — 59400 OBSTETRICAL CARE: CPT | Mod: GB,,, | Performed by: OBSTETRICS & GYNECOLOGY

## 2020-02-24 PROCEDURE — 62326 NJX INTERLAMINAR LMBR/SAC: CPT | Performed by: STUDENT IN AN ORGANIZED HEALTH CARE EDUCATION/TRAINING PROGRAM

## 2020-02-24 PROCEDURE — 59025 FETAL NON-STRESS TEST: CPT | Mod: 26,,, | Performed by: OBSTETRICS & GYNECOLOGY

## 2020-02-24 PROCEDURE — 27200710 HC EPIDURAL INFUSION PUMP SET: Performed by: ANESTHESIOLOGY

## 2020-02-24 PROCEDURE — 25000003 PHARM REV CODE 250: Performed by: ANESTHESIOLOGY

## 2020-02-24 PROCEDURE — 51702 INSERT TEMP BLADDER CATH: CPT

## 2020-02-24 PROCEDURE — 59025 PR FETAL 2N-STRESS TEST: ICD-10-PCS | Mod: 26,,, | Performed by: OBSTETRICS & GYNECOLOGY

## 2020-02-24 PROCEDURE — 59400 PR FULL ROUT OBSTE CARE,VAGINAL DELIV: ICD-10-PCS | Mod: GB,,, | Performed by: OBSTETRICS & GYNECOLOGY

## 2020-02-24 PROCEDURE — 72100002 HC LABOR CARE, 1ST 8 HOURS

## 2020-02-24 PROCEDURE — 0502F SUBSEQUENT PRENATAL CARE: CPT | Mod: CPTII,S$GLB,, | Performed by: OBSTETRICS & GYNECOLOGY

## 2020-02-24 PROCEDURE — 63600175 PHARM REV CODE 636 W HCPCS: Performed by: STUDENT IN AN ORGANIZED HEALTH CARE EDUCATION/TRAINING PROGRAM

## 2020-02-24 PROCEDURE — 85025 COMPLETE CBC W/AUTO DIFF WBC: CPT

## 2020-02-24 PROCEDURE — 59409 OBSTETRICAL CARE: CPT | Mod: QY,,, | Performed by: ANESTHESIOLOGY

## 2020-02-24 RX ORDER — CALCIUM CARBONATE 200(500)MG
500 TABLET,CHEWABLE ORAL 3 TIMES DAILY PRN
Status: DISCONTINUED | OUTPATIENT
Start: 2020-02-24 | End: 2020-02-25

## 2020-02-24 RX ORDER — SIMETHICONE 80 MG
1 TABLET,CHEWABLE ORAL 4 TIMES DAILY PRN
Status: DISCONTINUED | OUTPATIENT
Start: 2020-02-24 | End: 2020-02-25

## 2020-02-24 RX ORDER — SODIUM CHLORIDE 9 MG/ML
INJECTION, SOLUTION INTRAVENOUS CONTINUOUS
Status: DISCONTINUED | OUTPATIENT
Start: 2020-02-24 | End: 2020-02-24

## 2020-02-24 RX ORDER — FENTANYL/BUPIVACAINE/NS/PF 2MCG/ML-.1
PLASTIC BAG, INJECTION (ML) INJECTION CONTINUOUS
Status: CANCELLED | OUTPATIENT
Start: 2020-02-24

## 2020-02-24 RX ORDER — SODIUM CHLORIDE 9 MG/ML
INJECTION, SOLUTION INTRAVENOUS
Status: DISCONTINUED | OUTPATIENT
Start: 2020-02-24 | End: 2020-02-25

## 2020-02-24 RX ORDER — OXYTOCIN/RINGER'S LACTATE 30/500 ML
95 PLASTIC BAG, INJECTION (ML) INTRAVENOUS ONCE
Status: COMPLETED | OUTPATIENT
Start: 2020-02-24 | End: 2020-02-24

## 2020-02-24 RX ORDER — SODIUM CITRATE AND CITRIC ACID MONOHYDRATE 334; 500 MG/5ML; MG/5ML
30 SOLUTION ORAL ONCE
Status: CANCELLED | OUTPATIENT
Start: 2020-02-24 | End: 2020-02-24

## 2020-02-24 RX ORDER — ONDANSETRON 8 MG/1
8 TABLET, ORALLY DISINTEGRATING ORAL EVERY 8 HOURS PRN
Status: DISCONTINUED | OUTPATIENT
Start: 2020-02-24 | End: 2020-02-25

## 2020-02-24 RX ORDER — OXYTOCIN/RINGER'S LACTATE 30/500 ML
2 PLASTIC BAG, INJECTION (ML) INTRAVENOUS CONTINUOUS
Status: DISCONTINUED | OUTPATIENT
Start: 2020-02-24 | End: 2020-02-25

## 2020-02-24 RX ORDER — FENTANYL/BUPIVACAINE/NS/PF 2MCG/ML-.1
PLASTIC BAG, INJECTION (ML) INJECTION
Status: DISCONTINUED | OUTPATIENT
Start: 2020-02-24 | End: 2020-02-24

## 2020-02-24 RX ORDER — FAMOTIDINE 10 MG/ML
20 INJECTION INTRAVENOUS ONCE
Status: CANCELLED | OUTPATIENT
Start: 2020-02-24 | End: 2020-02-24

## 2020-02-24 RX ORDER — LIDOCAINE HYDROCHLORIDE AND EPINEPHRINE 15; 5 MG/ML; UG/ML
INJECTION, SOLUTION EPIDURAL
Status: DISCONTINUED | OUTPATIENT
Start: 2020-02-24 | End: 2020-02-24

## 2020-02-24 RX ORDER — OXYTOCIN/RINGER'S LACTATE 30/500 ML
334 PLASTIC BAG, INJECTION (ML) INTRAVENOUS ONCE
Status: COMPLETED | OUTPATIENT
Start: 2020-02-24 | End: 2020-02-24

## 2020-02-24 RX ORDER — SODIUM CHLORIDE, SODIUM LACTATE, POTASSIUM CHLORIDE, CALCIUM CHLORIDE 600; 310; 30; 20 MG/100ML; MG/100ML; MG/100ML; MG/100ML
INJECTION, SOLUTION INTRAVENOUS CONTINUOUS
Status: DISCONTINUED | OUTPATIENT
Start: 2020-02-24 | End: 2020-02-25

## 2020-02-24 RX ORDER — FENTANYL/BUPIVACAINE/NS/PF 2MCG/ML-.1
PLASTIC BAG, INJECTION (ML) INJECTION CONTINUOUS PRN
Status: DISCONTINUED | OUTPATIENT
Start: 2020-02-24 | End: 2020-02-24

## 2020-02-24 RX ORDER — FENTANYL/BUPIVACAINE/NS/PF 2MCG/ML-.1
PLASTIC BAG, INJECTION (ML) INJECTION
Status: COMPLETED
Start: 2020-02-24 | End: 2020-02-24

## 2020-02-24 RX ADMIN — SODIUM CHLORIDE, SODIUM LACTATE, POTASSIUM CHLORIDE, AND CALCIUM CHLORIDE 1000 ML: .6; .31; .03; .02 INJECTION, SOLUTION INTRAVENOUS at 01:02

## 2020-02-24 RX ADMIN — Medication 334 MILLI-UNITS/MIN: at 08:02

## 2020-02-24 RX ADMIN — Medication 10 ML/HR: at 02:02

## 2020-02-24 RX ADMIN — ONDANSETRON 8 MG: 8 TABLET, ORALLY DISINTEGRATING ORAL at 02:02

## 2020-02-24 RX ADMIN — LIDOCAINE HYDROCHLORIDE,EPINEPHRINE BITARTRATE 3 ML: 15; .005 INJECTION, SOLUTION EPIDURAL; INFILTRATION; INTRACAUDAL; PERINEURAL at 01:02

## 2020-02-24 RX ADMIN — Medication 3 ML: at 01:02

## 2020-02-24 RX ADMIN — Medication 2 MILLI-UNITS/MIN: at 01:02

## 2020-02-24 RX ADMIN — SODIUM CHLORIDE, SODIUM LACTATE, POTASSIUM CHLORIDE, AND CALCIUM CHLORIDE: .6; .31; .03; .02 INJECTION, SOLUTION INTRAVENOUS at 02:02

## 2020-02-24 RX ADMIN — Medication 3 ML: at 02:02

## 2020-02-24 RX ADMIN — Medication 95 MILLI-UNITS/MIN: at 09:02

## 2020-02-24 NOTE — H&P
[facility]  Obstetrics  History & Physical    Patient Name: Dora Hernandez  MRN: [id number]  Admission Date: 2020  Primary Care Provider: Janna Centeno MD    Subjective:     Principal Problem:Non reassuring fetal heart tracing in PNT, polyhydramnios    History of Present Illness:  Dora Hernandez is a 30 y.o. [id number] at 39w0d presents for IOL secondary to non reactive NST at term along with polyhydramnios (MVP 10.3 cm. JUNAID 28.6 cm).   This IUP is complicated by neurocardiogenic syncope. Patient denies normal labor complaints.         OB History    Para Term  AB Living   4 1 1 0 2 1   SAB TAB Ectopic Multiple Live Births   1 1 0 0 1      # Outcome Date GA Lbr Malik/2nd Weight Sex Delivery Anes PTL Lv   4 Current            3 Term 16 37w4d  3.487 kg (7 lb 11 oz) M Vag-Spont EPI N MARCO A      Complications: Polyhydramnios      Name: Arnold Hernandez III   2 TAB 16 6w0d    TAB      1 SAB 13 6w0d    SAB         Complications: Blighted ovum     Past Medical History:   Diagnosis Date    Abnormal Pap smear     Anxiety     Depression     Migraine headache     Polycystic disease, ovaries      Past Surgical History:   Procedure Laterality Date    TYMPANOSTOMY         PTA Medications   Medication Sig    cetirizine (ZYRTEC) 10 MG tablet Take 1 tablet (10 mg total) by mouth once daily.    fluticasone propionate (FLONASE) 50 mcg/actuation nasal spray 1 spray (50 mcg total) by Each Nostril route once daily.    iron polysaccharides (NIFEREX) 150 mg iron Cap Take 1 capsule (150 mg total) by mouth once daily.    PNV no.95/ferrous fum/folic ac (PRENATAL ORAL) Take by mouth.       Review of patient's allergies indicates:   Allergen Reactions    Metformin Nausea And Vomiting        Family History     Problem Relation (Age of Onset)    Hypertension Father    Lung cancer Maternal Grandmother    Stroke Father    Thyroid disease Mother        Tobacco Use     Smoking status: Never Smoker    Smokeless tobacco: Never Used   Substance and Sexual Activity    Alcohol use: Not Currently     Frequency: Monthly or less     Drinks per session: 1 or 2     Binge frequency: Never    Drug use: No    Sexual activity: Yes     Partners: Male     Birth control/protection: None     Review of Systems   Constitutional: Negative for chills and fever.   Eyes: Negative for visual disturbance.   Respiratory: Negative for shortness of breath.    Cardiovascular: Negative for chest pain and palpitations.   Gastrointestinal: Negative for abdominal pain, constipation, diarrhea, nausea and vomiting.   Genitourinary: Negative for vaginal bleeding and vaginal discharge.   Musculoskeletal: Negative for back pain.   Integumentary:  Negative for rash.   Neurological: Negative for seizures, syncope and headaches.   Hematological: Does not bruise/bleed easily.   Psychiatric/Behavioral: Negative for depression. The patient is not nervous/anxious.       Objective:     Vital Signs (Most Recent):    Vital Signs (24h Range):  BP: (128)/(82) 128/82        There is no height or weight on file to calculate BMI.    FHT: Cat 1 (reassuring)  TOCO:  Q 5 minutes    Physical Exam:    HENT:   Nose: No epistaxis.                                Cervix:  Dilation:  4  Effacement:  75%  Station: -2  Presentation: Vertex     Significant Labs:  Lab Results   Component Value Date    GROUPTRH O POS 2019    HEPBSAG Negative 2019    STREPBCULT No Group B Streptococcus isolated 2020       I have personallly reviewed all pertinent lab results from the last 24 hours.    Assessment/Plan:     30 y.o. female  at 39w0d for:    * Encounter for elective induction of labor  - Consents signed and to chart  - Admit to Labor and Delivery unit  - Plan for induction is PITOCIN  - Epidural per Anesthesia  - Draw CBC, T&S  - Notify Staff  - Ultrasound performed, fetus in CEPHALIC position.   - Recheck in 2 hrs or PRN  -  Post-Partum Hemorrhage risk - low          Syncope and collapse  Avoid lying supine. Discussed left lateral tilt to avoid aorto-caval compression        Myles Price MD  Obstetrics  [facility]    Patient has been seen and examined. I agree with the above resident assessment and plan.

## 2020-02-24 NOTE — ASSESSMENT & PLAN NOTE
- Consents signed and to chart  - Admit to Labor and Delivery unit  - Plan for induction is PITOCIN  - Epidural per Anesthesia  - Draw CBC, T&S  - Notify Staff  - Ultrasound performed, fetus in CEPHALIC position.   - Recheck in 2 hrs or PRN  - Post-Partum Hemorrhage risk - low

## 2020-02-24 NOTE — PROGRESS NOTES
Patient reports good fm, no vaginal bleeding, occasional contractions, no rupture of membranes. Overall doing well. Labs reviewed. Questions answered today. Completed prenatal classes. Labor, ROM and VB precautions given. Patient denies headaches, blurry vision, chest pain, shortness of breath or right upper quadrant pain. NST today for poly. Discussed induction - plan 2/26 at 4:30am.

## 2020-02-24 NOTE — HOSPITAL COURSE
02/24/2020 - Admit for IOL. Progressed with pitocin. Had difficulty in second stage with fetal decelerations requiring labor rest. Eventually delivered with second degree laceration.

## 2020-02-24 NOTE — ANESTHESIA PROCEDURE NOTES
Epidural    Patient location during procedure: OB   Reason for block: primary anesthetic   Diagnosis: iup   Start time: 2/24/2020 1:48 PM  Timeout: 2/24/2020 1:48 PM  End time: 2/24/2020 2:00 PM  Surgery related to: Vaginal Delivery    Staffing  Performing Provider: Mac Benedict MD  Authorizing Provider: Lucie Hernández MD        Preanesthetic Checklist  Completed: patient identified, surgical consent, pre-op evaluation, timeout performed, IV checked, risks and benefits discussed, monitors and equipment checked, anesthesia consent given, hand hygiene performed and patient being monitored  Preparation  Patient position: sitting  Prep: ChloraPrep  Patient monitoring: Pulse Ox  Epidural  Skin Anesthetic: lidocaine 1%  Skin Wheal: 3 mL  Administration type: continuous  Approach: midline  Interspace: L3-4    Injection technique: CHEMA saline  Needle and Epidural Catheter  Needle type: Tuohy   Needle gauge: 17  Needle length: 3.5 inches  Needle insertion depth: 9 cm  Catheter type: springwound and multi-orifice  Catheter size: 19 G  Catheter at skin depth: 14 cm  Test dose: 3 mL of lidocaine 1.5% with Epi 1-to-200,000  Additional Documentation: incremental injection, negative aspiration for heme and CSF, no paresthesia on injection, no signs/symptoms of IV or SA injection, no significant pain on injection and no significant complaints from patient  Needle localization: anatomical landmarks  Medications:  Volume per aspiration: 5 mL  Time between aspirations: 5 minutes  Assessment  Upper dermatomal levels - Left: T9  Right: T10  Ease of block: easy  Patient's tolerance of the procedure: comfortable throughout block and no complaints  Additional Notes  Pt very jumpy and moved significantly when dpe done. After removal of spinal needle, small amount of fluid returned. No overt wet tap, and negative aspiration and test dose. No inadvertent dural puncture with Tuohy.  Dural puncture performed with spinal  needle.

## 2020-02-24 NOTE — ANESTHESIA PREPROCEDURE EVALUATION
Dora Hernandez is a 30 y.o. [id number] at 39w0d presents for IOL secondary to non reactive NST at term along with polyhydramnios (MVP 10.3 cm. JUNAID 28.6 cm).     This IUP is complicated by neurocardiogenic syncope. Patient denies normal labor complaints.    Patient was seen by cardiology 19 and cleared to proceed with SVE, recommended caution with oxytocin and spinal anesthetics.     OB History    Para Term  AB Living   4 1 1   2 1   SAB TAB Ectopic Multiple Live Births   1 1     1      # Outcome Date GA Lbr Malik/2nd Weight Sex Delivery Anes PTL Lv   4 Current            3 Term 16 37w4d  3.487 kg (7 lb 11 oz) M Vag-Spont EPI N MARCO A      Complications: Polyhydramnios   2 TAB 16 6w0d    TAB      1 SAB 13 6w0d    SAB         Complications: Blighted ovum       Wt Readings from Last 1 Encounters:   20 1236 114.8 kg (253 lb)       BP Readings from Last 3 Encounters:   20 120/76   20 128/82   20 138/74       Patient Active Problem List   Diagnosis    BMI 33.0-33.9,adult    Chronic pain of right ankle    Premenstrual dysphoric disorder    Depression    DOUGLAS (generalized anxiety disorder)    Prediabetes    Syncope and collapse    Encounter for elective induction of labor       Past Surgical History:   Procedure Laterality Date    TYMPANOSTOMY         Social History     Socioeconomic History    Marital status:      Spouse name: Not on file    Number of children: Not on file    Years of education: Not on file    Highest education level: Not on file   Occupational History    Not on file   Social Needs    Financial resource strain: Somewhat hard    Food insecurity:     Worry: Sometimes true     Inability: Sometimes true    Transportation needs:     Medical: No     Non-medical: No   Tobacco Use    Smoking status: Never Smoker     Smokeless tobacco: Never Used   Substance and Sexual Activity    Alcohol use: Not Currently     Frequency: Monthly or less     Drinks per session: 1 or 2     Binge frequency: Never    Drug use: No    Sexual activity: Yes     Partners: Male     Birth control/protection: None   Lifestyle    Physical activity:     Days per week: 0 days     Minutes per session: 0 min    Stress: To some extent   Relationships    Social connections:     Talks on phone: More than three times a week     Gets together: More than three times a week     Attends Zoroastrian service: Not on file     Active member of club or organization: Yes     Attends meetings of clubs or organizations: More than 4 times per year     Relationship status:    Other Topics Concern    Not on file   Social History Narrative    Not on file       Review of patient's allergies indicates:   Allergen Reactions    Metformin Nausea And Vomiting         Chemistry        Component Value Date/Time     (L) 12/09/2019 2155    K 5.4 (H) 12/09/2019 2155     12/09/2019 2155    CO2 15 (L) 12/09/2019 2155    BUN 5 (L) 12/09/2019 2155    CREATININE 0.6 12/09/2019 2155    GLU 93 12/09/2019 2155        Component Value Date/Time    CALCIUM 8.9 12/09/2019 2155    ALKPHOS 108 12/09/2019 2155    AST 23 12/09/2019 2155    ALT 11 12/09/2019 2155    BILITOT 0.3 12/09/2019 2155    ESTGFRAFRICA >60 12/09/2019 2155    EGFRNONAA >60 12/09/2019 2155            Lab Results   Component Value Date    WBC 5.36 02/24/2020    HGB 12.1 02/24/2020    HCT 39.2 02/24/2020    MCV 81 (L) 02/24/2020     02/24/2020       No results for input(s): PT, INR, PROTIME, APTT in the last 72 hours.                  Anesthesia Evaluation    I have reviewed the Patient Summary Reports.     I have reviewed the Medications.     Review of Systems  Anesthesia Hx:  History of prior surgery of interest to airway management or planning: Denies Family Hx of Anesthesia complications.   Denies  Personal Hx of Anesthesia complications.   Hematology/Oncology:     Oncology Normal    -- Anemia:   EENT/Dental:EENT/Dental Normal   Pulmonary:  Pulmonary Normal    Renal/:  Renal/ Normal     Hepatic/GI:  Hepatic/GI Normal    Neurological:   Syncope- neurocardiogenic    Endocrine:  Endocrine Normal    Psych:  Psychiatric Normal           Physical Exam  General:  Obesity    Airway/Jaw/Neck:  Airway Findings: Mouth Opening: Normal Tongue: Normal  Mallampati: II  Improves to I with phonation.  TM Distance: Normal, at least 6 cm      Dental:  Dental Findings: In tact   Chest/Lungs:  Chest/Lungs Findings: Clear to auscultation, Normal Respiratory Rate     Heart/Vascular:  Heart Findings: Rate: Normal  Rhythm: Regular Rhythm     Abdomen:  Abdomen Findings:  Normal, Soft, Nontender     Musculoskeletal:  Musculoskeletal Findings: Normal    Mental Status:  Mental Status Findings:  Cooperative, Alert and Oriented         Anesthesia Plan  Type of Anesthesia, risks & benefits discussed:  Anesthesia Type:  general, epidural, CSE, spinal  Patient's Preference:   Intra-op Monitoring Plan: standard ASA monitors  Intra-op Monitoring Plan Comments:   Post Op Pain Control Plan: per primary service following discharge from PACU, IV/PO Opioids PRN and multimodal analgesia  Post Op Pain Control Plan Comments:   Induction:   IV  Beta Blocker:  Patient is not currently on a Beta-Blocker (No further documentation required).       Informed Consent: Patient understands risks and agrees with Anesthesia plan.  Questions answered. Anesthesia consent signed with patient.  ASA Score: 2     Day of Surgery Review of History & Physical:    H&P update referred to the surgeon.         Ready For Surgery From Anesthesia Perspective.

## 2020-02-24 NOTE — SUBJECTIVE & OBJECTIVE
OB History    Para Term  AB Living   4 1 1 0 2 1   SAB TAB Ectopic Multiple Live Births   1 1 0 0 1      # Outcome Date GA Lbr Malik/2nd Weight Sex Delivery Anes PTL Lv   4 Current            3 Term 16 37w4d  3.487 kg (7 lb 11 oz) M Vag-Spont EPI N MARCO A      Complications: Polyhydramnios      Name: Arnold Hernandez III   2 TAB 16 6w0d    TAB      1 SAB 13 6w0d    SAB         Complications: Blighted ovum     Past Medical History:   Diagnosis Date    Abnormal Pap smear     Anxiety     Depression     Migraine headache     Polycystic disease, ovaries      Past Surgical History:   Procedure Laterality Date    TYMPANOSTOMY         PTA Medications   Medication Sig    cetirizine (ZYRTEC) 10 MG tablet Take 1 tablet (10 mg total) by mouth once daily.    fluticasone propionate (FLONASE) 50 mcg/actuation nasal spray 1 spray (50 mcg total) by Each Nostril route once daily.    iron polysaccharides (NIFEREX) 150 mg iron Cap Take 1 capsule (150 mg total) by mouth once daily.    PNV no.95/ferrous fum/folic ac (PRENATAL ORAL) Take by mouth.       Review of patient's allergies indicates:   Allergen Reactions    Metformin Nausea And Vomiting        Family History     Problem Relation (Age of Onset)    Hypertension Father    Lung cancer Maternal Grandmother    Stroke Father    Thyroid disease Mother        Tobacco Use    Smoking status: Never Smoker    Smokeless tobacco: Never Used   Substance and Sexual Activity    Alcohol use: Not Currently     Frequency: Monthly or less     Drinks per session: 1 or 2     Binge frequency: Never    Drug use: No    Sexual activity: Yes     Partners: Male     Birth control/protection: None     Review of Systems   Constitutional: Negative for chills and fever.   Eyes: Negative for visual disturbance.   Respiratory: Negative for shortness of breath.    Cardiovascular: Negative for chest pain and palpitations.   Gastrointestinal: Negative for abdominal pain,  constipation, diarrhea, nausea and vomiting.   Genitourinary: Negative for vaginal bleeding and vaginal discharge.   Musculoskeletal: Negative for back pain.   Integumentary:  Negative for rash.   Neurological: Negative for seizures, syncope and headaches.   Hematological: Does not bruise/bleed easily.   Psychiatric/Behavioral: Negative for depression. The patient is not nervous/anxious.       Objective:     Vital Signs (Most Recent):    Vital Signs (24h Range):  BP: (128)/(82) 128/82        There is no height or weight on file to calculate BMI.    FHT: Cat 1 (reassuring)  TOCO:  Q 5 minutes    Physical Exam:    HENT:   Nose: No epistaxis.                                Cervix:  Dilation:  4  Effacement:  75%  Station: -2  Presentation: Vertex     Significant Labs:  Lab Results   Component Value Date    GROUPTRH O POS 07/22/2019    HEPBSAG Negative 07/22/2019    STREPBCULT No Group B Streptococcus isolated 02/04/2020       I have personallly reviewed all pertinent lab results from the last 24 hours.

## 2020-02-24 NOTE — PROGRESS NOTES
"LABOR NOTE    S:  Complaints: No.  Epidural working:  yes  MD to bedside for routine cervical check    O: /65   Pulse 78   Temp 97.7 °F (36.5 °C) (Tympanic)   Resp 18   Ht 5' 7" (1.702 m)   Wt 114.8 kg (253 lb)   LMP 2019 (Exact Date)   SpO2 100%   Breastfeeding? No   BMI 39.63 kg/m²       FHT: 140, mod aparna, + accels, - decels Cat 1 (reassuring)  CTX: q 2-4 minutes  SVE: /-2 @1600, AROM clr      ASSESSMENT:   30 y.o.  at 39w0d, here for IOL 2/2 NRFHT in PNT    FHT reassuring    Active Hospital Problems    Diagnosis  POA    *Encounter for elective induction of labor [Z34.90]  Not Applicable    Syncope and collapse [R55]  Yes    DOUGLAS (generalized anxiety disorder) [F41.1]  Yes      Resolved Hospital Problems   No resolved problems to display.     Labor Course:  1230: /-2  1600: /, AROM clr    PLAN:    Continue Close Maternal/Fetal Monitoring  Pitocin Augmentation per protocol  Recheck 2 hours or PRN    Lakshmi Marcus M.D.   OB/GYN  PGY-1    "

## 2020-02-24 NOTE — ADDENDUM NOTE
Encounter addended by: Shayy Stahl RN on: 2/24/2020 5:03 PM   Actions taken: Care Plan modified, Charge Capture section accepted

## 2020-02-25 LAB
BASOPHILS # BLD AUTO: 0.03 K/UL (ref 0–0.2)
BASOPHILS NFR BLD: 0.2 % (ref 0–1.9)
DIFFERENTIAL METHOD: ABNORMAL
EOSINOPHIL # BLD AUTO: 0.1 K/UL (ref 0–0.5)
EOSINOPHIL NFR BLD: 1.1 % (ref 0–8)
ERYTHROCYTE [DISTWIDTH] IN BLOOD BY AUTOMATED COUNT: 17.5 % (ref 11.5–14.5)
HCT VFR BLD AUTO: 35 % (ref 37–48.5)
HGB BLD-MCNC: 10.7 G/DL (ref 12–16)
IMM GRANULOCYTES # BLD AUTO: 0.03 K/UL (ref 0–0.04)
IMM GRANULOCYTES NFR BLD AUTO: 0.2 % (ref 0–0.5)
LYMPHOCYTES # BLD AUTO: 1.5 K/UL (ref 1–4.8)
LYMPHOCYTES NFR BLD: 12.3 % (ref 18–48)
MCH RBC QN AUTO: 25.1 PG (ref 27–31)
MCHC RBC AUTO-ENTMCNC: 30.6 G/DL (ref 32–36)
MCV RBC AUTO: 82 FL (ref 82–98)
MONOCYTES # BLD AUTO: 1 K/UL (ref 0.3–1)
MONOCYTES NFR BLD: 7.8 % (ref 4–15)
NEUTROPHILS # BLD AUTO: 9.7 K/UL (ref 1.8–7.7)
NEUTROPHILS NFR BLD: 78.4 % (ref 38–73)
NRBC BLD-RTO: 0 /100 WBC
PLATELET # BLD AUTO: 268 K/UL (ref 150–350)
PMV BLD AUTO: 9.7 FL (ref 9.2–12.9)
RBC # BLD AUTO: 4.27 M/UL (ref 4–5.4)
WBC # BLD AUTO: 12.35 K/UL (ref 3.9–12.7)

## 2020-02-25 PROCEDURE — 25000003 PHARM REV CODE 250: Performed by: STUDENT IN AN ORGANIZED HEALTH CARE EDUCATION/TRAINING PROGRAM

## 2020-02-25 PROCEDURE — 36415 COLL VENOUS BLD VENIPUNCTURE: CPT

## 2020-02-25 PROCEDURE — 11000001 HC ACUTE MED/SURG PRIVATE ROOM

## 2020-02-25 PROCEDURE — 85025 COMPLETE CBC W/AUTO DIFF WBC: CPT

## 2020-02-25 RX ORDER — HYDROCODONE BITARTRATE AND ACETAMINOPHEN 5; 325 MG/1; MG/1
1 TABLET ORAL EVERY 4 HOURS PRN
Status: DISCONTINUED | OUTPATIENT
Start: 2020-02-25 | End: 2020-02-26 | Stop reason: HOSPADM

## 2020-02-25 RX ORDER — HYDROCORTISONE 25 MG/G
CREAM TOPICAL 3 TIMES DAILY PRN
Status: DISCONTINUED | OUTPATIENT
Start: 2020-02-25 | End: 2020-02-26 | Stop reason: HOSPADM

## 2020-02-25 RX ORDER — DOCUSATE SODIUM 100 MG/1
200 CAPSULE, LIQUID FILLED ORAL 2 TIMES DAILY PRN
Status: DISCONTINUED | OUTPATIENT
Start: 2020-02-25 | End: 2020-02-26 | Stop reason: HOSPADM

## 2020-02-25 RX ORDER — SIMETHICONE 80 MG
1 TABLET,CHEWABLE ORAL EVERY 6 HOURS PRN
Status: DISCONTINUED | OUTPATIENT
Start: 2020-02-25 | End: 2020-02-26 | Stop reason: HOSPADM

## 2020-02-25 RX ORDER — IBUPROFEN 600 MG/1
600 TABLET ORAL EVERY 6 HOURS
Status: DISCONTINUED | OUTPATIENT
Start: 2020-02-25 | End: 2020-02-26 | Stop reason: HOSPADM

## 2020-02-25 RX ORDER — DIPHENHYDRAMINE HYDROCHLORIDE 50 MG/ML
25 INJECTION INTRAMUSCULAR; INTRAVENOUS EVERY 4 HOURS PRN
Status: DISCONTINUED | OUTPATIENT
Start: 2020-02-25 | End: 2020-02-26 | Stop reason: HOSPADM

## 2020-02-25 RX ORDER — ACETAMINOPHEN 325 MG/1
650 TABLET ORAL EVERY 6 HOURS PRN
Status: DISCONTINUED | OUTPATIENT
Start: 2020-02-25 | End: 2020-02-26 | Stop reason: HOSPADM

## 2020-02-25 RX ORDER — OXYTOCIN/RINGER'S LACTATE 30/500 ML
95 PLASTIC BAG, INJECTION (ML) INTRAVENOUS ONCE
Status: DISCONTINUED | OUTPATIENT
Start: 2020-02-25 | End: 2020-02-26 | Stop reason: HOSPADM

## 2020-02-25 RX ORDER — HYDROCODONE BITARTRATE AND ACETAMINOPHEN 10; 325 MG/1; MG/1
1 TABLET ORAL EVERY 4 HOURS PRN
Status: DISCONTINUED | OUTPATIENT
Start: 2020-02-25 | End: 2020-02-26 | Stop reason: HOSPADM

## 2020-02-25 RX ORDER — PRENATAL WITH FERROUS FUM AND FOLIC ACID 3080; 920; 120; 400; 22; 1.84; 3; 20; 10; 1; 12; 200; 27; 25; 2 [IU]/1; [IU]/1; MG/1; [IU]/1; MG/1; MG/1; MG/1; MG/1; MG/1; MG/1; UG/1; MG/1; MG/1; MG/1; MG/1
1 TABLET ORAL DAILY
Status: DISCONTINUED | OUTPATIENT
Start: 2020-02-25 | End: 2020-02-26 | Stop reason: HOSPADM

## 2020-02-25 RX ORDER — ONDANSETRON 8 MG/1
8 TABLET, ORALLY DISINTEGRATING ORAL EVERY 8 HOURS PRN
Status: DISCONTINUED | OUTPATIENT
Start: 2020-02-25 | End: 2020-02-26 | Stop reason: HOSPADM

## 2020-02-25 RX ORDER — DIPHENHYDRAMINE HCL 25 MG
25 CAPSULE ORAL EVERY 4 HOURS PRN
Status: DISCONTINUED | OUTPATIENT
Start: 2020-02-25 | End: 2020-02-26 | Stop reason: HOSPADM

## 2020-02-25 RX ADMIN — PRENATAL VIT W/ FE FUMARATE-FA TAB 27-0.8 MG 1 TABLET: 27-0.8 TAB at 10:02

## 2020-02-25 RX ADMIN — ACETAMINOPHEN 650 MG: 325 TABLET ORAL at 12:02

## 2020-02-25 RX ADMIN — IBUPROFEN 600 MG: 600 TABLET, FILM COATED ORAL at 07:02

## 2020-02-25 RX ADMIN — IBUPROFEN 600 MG: 600 TABLET, FILM COATED ORAL at 04:02

## 2020-02-25 RX ADMIN — DOCUSATE SODIUM 200 MG: 100 CAPSULE, LIQUID FILLED ORAL at 01:02

## 2020-02-25 RX ADMIN — IBUPROFEN 600 MG: 600 TABLET, FILM COATED ORAL at 10:02

## 2020-02-25 NOTE — ASSESSMENT & PLAN NOTE
- Continue routine post partum advances  - Diet: regular  - Lactation: breast  - Contraception: discuss outpatient  - Encourage ambulation  - Pain well controlled on PO analgesics. Continue to monitor pain scale  - VSS. Wnl. Vitals q4

## 2020-02-25 NOTE — LACTATION NOTE
02/25/20 1220   Maternal Assessment   Breast Shape Bilateral:;round   Breast Density Bilateral:;soft   Areola Bilateral:;elastic   Nipples Bilateral:;everted   Maternal Infant Feeding   Maternal Emotional State independent;relaxed   Equipment Type   Breast Pump Type double electric, hospital grade   Breast Pump Flange Type hard   Breast Pump Flange Size 24 mm   Breast Pumping   Breast Pumping Interventions frequent pumping encouraged   Breast Pumping double electric breast pump utilized   1030: Pt not in room  1220: Basic lactation education provided for pumping for NICU infant using breastfeeding guide, all questions answered. Therapeutic [employer] pump, tubing, collections containers and labels brought to bedside.  Discussed proper pump setting of initiation phase.  Instructed on proper usage of pump and to adjust suction according to maximum comfort level.  Verified appropriate flange fit.  Educated on the frequency and duration of pumping in order to promote and maintain a full milk supply.  Hands on pumping technique reviewed.  Encouraged hand expression after pumping.  Instructed on cleaning of breast pump parts.  Written instructions also given. Educated mom on hand expression, mom declines assistance at this time.  Mom has Leikr PIS at home.  Mom to call  as needed for assistance.

## 2020-02-25 NOTE — SUBJECTIVE & OBJECTIVE
Hospital course: 02/24/2020 - Admit for IOL. Progressed with pitocin. Had difficulty in second stage with fetal decelerations requiring labor rest. Eventually delivered with second degree laceration.     Interval History:     PPD 1 . No complaints. Ambulating, voiding, tolerating PO, pain controlled, breast feeding.       Objective:     Vital Signs (Most Recent):  Temp: 97.7 °F (36.5 °C) (02/24/20 1929)  Pulse: (!) 112 (02/24/20 2106)  Resp: 18 (02/24/20 1929)  BP: (!) 148/66 (02/24/20 2106)  SpO2: 100 % (02/24/20 2024) Vital Signs (24h Range):  Temp:  [97.7 °F (36.5 °C)] 97.7 °F (36.5 °C)  Pulse:  [] 112  Resp:  [18] 18  SpO2:  [88 %-100 %] 100 %  BP: (103-148)/(58-82) 148/66     Weight: 114.8 kg (253 lb)  Body mass index is 39.63 kg/m².      Intake/Output Summary (Last 24 hours) at 2/24/2020 2156  Last data filed at 2/24/2020 2040  Gross per 24 hour   Intake --   Output 250 ml   Net -250 ml       Significant Labs:  Lab Results   Component Value Date    GROUPTRH O POS 02/24/2020    HEPBSAG Negative 07/22/2019    STREPBCULT No Group B Streptococcus isolated 02/04/2020     Recent Labs   Lab 02/24/20  1227   HGB 12.1   HCT 39.2       I have personallly reviewed all pertinent lab results from the last 24 hours.    Physical Exam:   Constitutional: She is oriented to person, place, and time. She appears well-developed and well-nourished. No distress.    HENT:   Head: Normocephalic and atraumatic.    Eyes: Conjunctivae are normal. Right eye exhibits no discharge. Left eye exhibits no discharge.    Neck: Neck supple. No tracheal deviation present.    Cardiovascular: Normal rate.     Pulmonary/Chest: Effort normal and breath sounds normal.        Abdominal: Soft.                 Neurological: She is alert and oriented to person, place, and time.    Skin: She is not diaphoretic.

## 2020-02-25 NOTE — ANESTHESIA POSTPROCEDURE EVALUATION
Anesthesia Post Evaluation    Patient: Dora Hernandez    Procedure(s) Performed: * No procedures listed *    Final Anesthesia Type: epidural    Patient location during evaluation: labor & delivery  Patient participation: Yes- Able to Participate  Level of consciousness: awake and alert, awake and oriented  Post-procedure vital signs: reviewed and stable  Pain management: adequate  Airway patency: patent    PONV status at discharge: No PONV      Cardiovascular status: blood pressure returned to baseline  Respiratory status: unassisted, spontaneous ventilation and room air  Hydration status: euvolemic            Vitals Value Taken Time   /70 2/25/2020  8:32 AM   Temp 36.6 °C (97.8 °F) 2/25/2020  8:32 AM   Pulse 89 2/25/2020  8:32 AM   Resp 18 2/25/2020  8:32 AM   SpO2 99 % 2/25/2020  8:32 AM         No case tracking events are documented in the log.      Pain/Rodney Score: Pain Rating Prior to Med Admin: 7 (2/25/2020  4:19 AM)  Pain Rating Post Med Admin: 5 (2/25/2020  5:00 AM)

## 2020-02-25 NOTE — NURSING
RN at bs. Patient to  using Ganesh Stedy. Patient voided without diff 800ml. rn gave pt pericare instructions with periwash bottle and tucks pads. Patient change to clean gown and returned to ganesh stekati due to slight numbness in right leg. Will transfer to  room 620 with her belongings, significant other present and supportive.

## 2020-02-25 NOTE — L&D DELIVERY NOTE
[facility]  Vaginal Delivery   Operative Note    SUMMARY     Normal spontaneous vaginal delivery of live infant, was placed on mothers abdomen for skin to skin and bulb suctioning performed, however due to inhalation of fluid was taken to warmer for deep suctioning.  Infant delivered position OA over intact perineum.  Nuchal cord: No.    Spontaneous delivery of placenta and IV pitocin given noting good uterine tone.  2nd degree laceration noted and repaired in normal fashion with 2-0 vicryl.  Patient tolerated delivery well. Sponge needle and lap counted correctly x2.    Indications: IOL 2/2 NRFHT  Pregnancy complicated by:   Patient Active Problem List   Diagnosis    BMI 33.0-33.9,adult    Chronic pain of right ankle    Premenstrual dysphoric disorder    Depression    Prediabetes    Encounter for elective induction of labor     (spontaneous vaginal delivery)     Admitting GA: 39w0d   Delivery Information for Ish Hernandez    Birth information:  YOB: 2021   Time of birth: 8:36 PM   Sex: male   Head Delivery Date/Time: 2020  8:35 PM   Delivery type: Vaginal, Spontaneous   Gestational Age: 39w0d    Delivery Providers    Delivering clinician:  Radha Short MD   Provider Role    Melody Celestin MD Resident    Alyssa Durham, RN Delivery Nurse    Adrienne Sadler, [facility]    Jovanni Riggs RN Registered Nurse    Maris Scherer, RN Registered Nurse            Measurements    Weight:  4082 g  Length:           Apgars    Living status:  Living  Apgars:   1 min.:   5 min.:   10 min.:   15 min.:   20 min.:     Skin color:   0  1       Heart rate:   2  2       Reflex irritability:   1  1       Muscle tone:   1  2       Respiratory effort:   1  2       Total:   5  8       Apgars assigned by:  DEN PRITCHARD RN/CARLA SCHERER RN         Operative Delivery    Forceps attempted?:  No  Vacuum extractor attempted?:  No         Shoulder Dystocia     Shoulder dystocia present?:  No           Presentation    Presentation:  Vertex  Position:  Right Occiput Anterior           Interventions/Resuscitation    Method:  Bulb Suctioning, Tactile Stimulation, Deep Suctioning       Cord    Vessels:  3 vessels  Complications:  None  Delayed Cord Clamping?:  No  Cord Clamped Date/Time:  2020  8:36 PM  Cord Blood Disposition:  Sent with Baby  Gases Sent?:  Yes  Stem Cell Collection (by MD):  No       Placenta    Placenta delivery date/time:  2020  Placenta removal:  Spontaneous  Placenta appearance:  Intact  Placenta disposition:  discarded           Labor Events:       labor: No     Labor Onset Date/Time: 2020 15:56     Dilation Complete Date/Time: 2020 19:07     Start Pushing Date/Time: 2020 19:09     Rupture Date/Time:              Rupture type:           Fluid Amount:        Fluid Color:        Fluid Odor:        Membrane Status (PeriCalm):        Rupture Date/Time (PeriCalm):        Fluid Amount (PeriCalm):        Fluid Color (PeriCalm):         steroids: None     Antibiotics given for GBS: No     Induction: oxytocin     Indications for induction:        Augmentation: amniotomy     Indications for augmentation: Ineffective Contraction Pattern     Labor complications: Fetal Intolerance     Additional complications: Oligohydramnios        Cervical ripening:                     Delivery:      Episiotomy: None     Indication for Episiotomy:       Perineal Lacerations: 2nd Repaired:      Periurethral Laceration:   Repaired:     Labial Laceration:   Repaired:     Sulcus Laceration:   Repaired:     Vaginal Laceration:   Repaired:     Cervical Laceration:   Repaired:     Repair suture:       Repair # of packets:       Last Value - EBL - Nursing (mL):       Sum - EBL - Nursing (mL): 0     Last Value - EBL - Anesthesia (mL):      Calculated QBL (mL): 250      Vaginal Sweep Performed:       Surgicount Correct: Yes       Other  providers:       Anesthesia    Method:  Epidural          Details (if applicable):  Trial of Labor      Categorization:      Priority:     Indications for :     Incision Type:       Additional  information:  Forceps:    Vacuum:    Breech:    Observed anomalies    Other (Comments):         Lakshmi Marcus M.D.   OB/GYN  PGY-1

## 2020-02-26 VITALS
WEIGHT: 253 LBS | HEART RATE: 89 BPM | TEMPERATURE: 98 F | OXYGEN SATURATION: 100 % | DIASTOLIC BLOOD PRESSURE: 68 MMHG | HEIGHT: 67 IN | SYSTOLIC BLOOD PRESSURE: 106 MMHG | RESPIRATION RATE: 18 BRPM | BODY MASS INDEX: 39.71 KG/M2

## 2020-02-26 PROCEDURE — 99024 POSTOP FOLLOW-UP VISIT: CPT | Mod: ,,, | Performed by: OBSTETRICS & GYNECOLOGY

## 2020-02-26 PROCEDURE — 90710 MMRV VACCINE SC: CPT | Performed by: STUDENT IN AN ORGANIZED HEALTH CARE EDUCATION/TRAINING PROGRAM

## 2020-02-26 PROCEDURE — 90471 IMMUNIZATION ADMIN: CPT | Performed by: STUDENT IN AN ORGANIZED HEALTH CARE EDUCATION/TRAINING PROGRAM

## 2020-02-26 PROCEDURE — 99024 PR POST-OP FOLLOW-UP VISIT: ICD-10-PCS | Mod: ,,, | Performed by: OBSTETRICS & GYNECOLOGY

## 2020-02-26 PROCEDURE — 63600175 PHARM REV CODE 636 W HCPCS: Performed by: STUDENT IN AN ORGANIZED HEALTH CARE EDUCATION/TRAINING PROGRAM

## 2020-02-26 PROCEDURE — 25000003 PHARM REV CODE 250: Performed by: STUDENT IN AN ORGANIZED HEALTH CARE EDUCATION/TRAINING PROGRAM

## 2020-02-26 RX ORDER — IBUPROFEN 600 MG/1
600 TABLET ORAL EVERY 6 HOURS
Qty: 30 TABLET | Refills: 1 | Status: SHIPPED | OUTPATIENT
Start: 2020-02-26 | End: 2021-07-20

## 2020-02-26 RX ADMIN — MEASLES, MUMPS, AND RUBELLA VIRUS VACCINE LIVE 0.5 ML: 1000; 12500; 1000 INJECTION, POWDER, LYOPHILIZED, FOR SUSPENSION SUBCUTANEOUS at 12:02

## 2020-02-26 RX ADMIN — DOCUSATE SODIUM 200 MG: 100 CAPSULE, LIQUID FILLED ORAL at 02:02

## 2020-02-26 RX ADMIN — IBUPROFEN 600 MG: 600 TABLET, FILM COATED ORAL at 01:02

## 2020-02-26 RX ADMIN — IBUPROFEN 600 MG: 600 TABLET, FILM COATED ORAL at 08:02

## 2020-02-26 RX ADMIN — PRENATAL VIT W/ FE FUMARATE-FA TAB 27-0.8 MG 1 TABLET: 27-0.8 TAB at 08:02

## 2020-02-26 RX ADMIN — DOCUSATE SODIUM 200 MG: 100 CAPSULE, LIQUID FILLED ORAL at 08:02

## 2020-02-26 NOTE — PROGRESS NOTES
Pt ambulating and voiding without difficulty. Patient safety maintained, side rails up, bed low and locked position.  Pain well controlled with PRN pain medication. Fundus midline, firm, with moderate lochia. VSS. Significant other at bedside; Baby in NICU;  Pt going down to see baby often. She is pumping frequently and bringing colostrum to NICU. No distress or discomfort at this time; will continue to monitor patient.

## 2020-02-26 NOTE — DISCHARGE SUMMARY
Delivery Discharge Summary  Obstetrics      Primary OB Clinician: Radha Short MD      Admission date: 2020  Discharge date: 2020    Disposition: To home, self care    Discharge Diagnosis List:      Patient Active Problem List   Diagnosis    BMI 33.0-33.9,adult    Chronic pain of right ankle    Premenstrual dysphoric disorder    Depression    Prediabetes     (spontaneous vaginal delivery)       Procedure:     Hospital Course:  Dora Hernandez is a 30 y.o. now , PPD #2 who was admitted on 2020 at 39w0d for non reactive NST and polyhydramnios on setting of term pregnancy. Patient was subsequently admitted to labor and delivery unit with signed consents.     Labor course was uncomplicated and resulted in  without complications    Please see delivery note for further details. Her postpartum course was uncomplicated. On discharge day, patient's pain is controlled with oral pain medications. Pt is tolerating ambulation without SOB or CP, and regular diet without N/V. Reports lochia is mild. Denies any HA, vision changes, F/C, LE swelling. Denies any breast pain/soreness.    Pt in stable condition and ready for discharge. She has been instructed to start and/or continue medications and follow up with her obstetrics provider as listed below.    Pertinent studies:  CBC  Recent Labs   Lab 20  1227 20  0743   WBC 5.36 12.35   HGB 12.1 10.7*   HCT 39.2 35.0*   MCV 81* 82    268        Immunization History   Administered Date(s) Administered    MMR 10/08/1994    Meningococcal Conjugate (MCV4P) 2008    Tdap 2020        Delivery:    Episiotomy: None   Lacerations: 2nd   Repair suture:     Repair # of packets:     Blood loss (ml):       Birth information:  YOB: 2021   Time of birth: 8:36 PM   Sex: male   Delivery type: Vaginal, Spontaneous   Gestational Age: 39w0d    Delivery Clinician:      Other providers:       Additional   information:  Forceps:    Vacuum:    Breech:    Observed anomalies      Living?:           APGARS  One minute Five minutes Ten minutes   Skin color:         Heart rate:         Grimace:         Muscle tone:         Breathing:         Totals: 5  8        Placenta: Delivered:       appearance      Patient Instructions:   Current Discharge Medication List      START taking these medications    Details   ibuprofen (ADVIL,MOTRIN) 600 MG tablet Take 1 tablet (600 mg total) by mouth every 6 (six) hours.  Qty: 30 tablet, Refills: 1         CONTINUE these medications which have NOT CHANGED    Details   iron polysaccharides (NIFEREX) 150 mg iron Cap Take 1 capsule (150 mg total) by mouth once daily.  Qty: 30 capsule, Refills: 6    Associated Diagnoses: Antepartum anemia      PNV no.95/ferrous fum/folic ac (PRENATAL ORAL) Take by mouth.         STOP taking these medications       cetirizine (ZYRTEC) 10 MG tablet Comments:   Reason for Stopping:         fluticasone propionate (FLONASE) 50 mcg/actuation nasal spray Comments:   Reason for Stopping:               Discharge Procedure Orders   Diet Adult Regular     Pelvic Rest   Order Comments: Pelvic rest until cleared by MD. Nothing in vagina till cleared by MD including tampons, douching, intercourse     Notify your health care provider if you experience any of the following:  temperature >100.4     Notify your health care provider if you experience any of the following:  severe uncontrolled pain     Notify your health care provider if you experience any of the following:  persistent nausea and vomiting or diarrhea     Notify your health care provider if you experience any of the following:  difficulty breathing or increased cough     Notify your health care provider if you experience any of the following:  severe persistent headache     Notify your health care provider if you experience any of the following:  worsening rash     Notify your health care provider if you experience  any of the following:  increased confusion or weakness     Notify your health care provider if you experience any of the following:  persistent dizziness, light-headedness, or visual disturbances     Notify your health care provider if you experience any of the following:   Order Comments: Notify MD if bleeding 1 pad/hour for 2 consecutive hours.     No dressing needed       Follow-up Information     Radha Short MD In 6 weeks.    Specialties:  Obstetrics, Obstetrics and Gynecology  Why:  Postpartum visit  Contact information:  [address] [place], [place] [id number] [phone]             Radha Short MD In 1 week.    Specialties:  Obstetrics, Obstetrics and Gynecology  Why:  Follow up visit  Contact information:  [address] [id number] [phone]                    Kamala Bonds MD  OB/GYN  PGY-2

## 2020-02-26 NOTE — LACTATION NOTE
02/26/20 0900   Maternal Infant Feeding   Maternal Emotional State relaxed;independent   Equipment Type   Breast Pump Type double electric, hospital grade;double electric, personal;other (see comments)  (has Medela Pump In Style for home )   Breast Pump Flange Type hard   Breast Pump Flange Size 27 mm   Breast Pumping   Breast Pumping Interventions frequent pumping encouraged   Breast Pumping double electric breast pump utilized   Patient states she has pumped 5 x and breast fed the baby x2 in the past 24 hours. Each pumping session has produced drops. Discussed daily target amounts and reinforced pumping 8 or more in 24 hours. Has a Medela Pump In Style for home use after discharge today. Has been using 24 mm flanges which patient states feels a little tight. Discussed increasing flange size to 27 mm and how to make sure the flange fit is correct. Pumping discharge instructions given. Plan is to meet with LC in NICU today for nursing session. Spoke with SHERYL De Santiago RN, IBCLC. LC number on the board to call as needed prior to discharge.

## 2020-02-26 NOTE — PLAN OF CARE
Pt ambulating and voiding without difficulty. Patient safety maintained, side rails up, bed low and locked position.  Pain well controlled with PRN pain medication. Fundus midline, firm, with moderate lochia. VSS. Significant other at bedside; Baby in NICU;  Pt going down to see baby often. She is pumping frequently. No distress or discomfort at this time; will continue to monitor patient.

## 2020-02-26 NOTE — PROGRESS NOTES
POSTPARTUM PROGRESS NOTE     Dora Hernandez is a 30 y.o. female PPD #2 status post Spontaneous vaginal delivery at 39w0d in a pregnancy complicated by migraines, h/o neurocardiogenic syncope, depression/anxiety. Patient is doing well this morning. She denies nausea, vomiting, fever or chills.   Patient reports mild abdominal pain that is adequately relieved by oral pain medications. Lochia is mild to moderate and stable. Patient is voiding without difficulty and ambulating with no difficulty. She has passed flatus.  Patient does plan to breast feed. Per primary OB for contraception. She desires circumcision however baby in NICU.    Objective:       Temp:  [97.8 °F (36.6 °C)-98.3 °F (36.8 °C)] 98.3 °F (36.8 °C)  Pulse:  [] 105  Resp:  [18] 18  SpO2:  [99 %] 99 %  BP: (107-120)/(57-70) 107/57    General:   alert, appears stated age and cooperative   Lungs:   Non-labored respirations    Heart:   regular rate and rhythm   Abdomen:  Soft, nondistended    Uterus:  firm located at the umblicus.    Extremities: no pedal edema noted     Lab Review  Recent Labs   Lab 20  1227 20  0743   WBC 5.36 12.35   HGB 12.1 10.7*   HCT 39.2 35.0*   MCV 81* 82    268        No results for input(s): NA, K, CL, CO2, BUN, CREATININE, GLU, PROT, BILITOT, ALKPHOS, ALT, AST, MG, PHOS in the last 168 hours.       I/O  No intake or output data in the 24 hours ending 20 0603     Assessment:     Patient Active Problem List   Diagnosis    BMI 33.0-33.9,adult    Chronic pain of right ankle    Premenstrual dysphoric disorder    Depression    Prediabetes     (spontaneous vaginal delivery)        Plan:   1. Postpartum care:  - Patient doing well. Continue routine management and advances.  - Continue PO pain meds. Pain well controlled.  - Heme: H/h  >>> 10.7/35  - Encourage ambulation  - Circumcision desired however baby in NICU  - Contraception to be discussed at 6 week pp visit  - Lactation consult  PRN    2. H/o neurocardiogenic syncope  - Has had extensive workup in the past  - Currently asymptomatic    3. Depression/anxiety  - Mood stable  - not currently on medications  - Will need 1 week post partum follow up    4. MO  - BMI: 39  - Continue use of TEDs/SCDs and ambulation     Dispo: As patient meets milestones, will plan to discharge today    Kamala Bonds MD  OB/GYN  PGY-2

## 2020-02-27 NOTE — NURSING
Patient DC to home, baby remains in NICU currently. No distress noted at this time. Patient walking, significant other at side for discharge.

## 2020-02-27 NOTE — PLAN OF CARE
Patient doing well. VS stable. Pain controlled well with oral medication. Patient to follow up in 1 week for BP check, and in 6 weeks for PP check.

## 2020-03-05 ENCOUNTER — TELEPHONE (OUTPATIENT)
Dept: OBSTETRICS AND GYNECOLOGY | Facility: OTHER | Age: 30
End: 2020-03-05

## 2020-03-05 NOTE — TELEPHONE ENCOUNTER
"Patient states she is doing well since being discharged home. She states her pain is well controlled without medication. She states her  is home from the NICU and is doing well; she states he has a "healthy appetite". She states she is still breastfeeding and pumping and denies any questions/concerns at this time. Patient states she had a great experience at [facility].  "

## 2020-03-16 ENCOUNTER — PATIENT MESSAGE (OUTPATIENT)
Dept: ADMINISTRATIVE | Facility: OTHER | Age: 30
End: 2020-03-16

## 2020-03-18 ENCOUNTER — TELEPHONE (OUTPATIENT)
Dept: ADMINISTRATIVE | Facility: CLINIC | Age: 30
End: 2020-03-18

## 2020-03-19 NOTE — TELEPHONE ENCOUNTER
Spoke to patient. Asked for symptoms, patient denied. Patient given OB Hotline number ([phone]) and directed to call if concerns regarding COVID 19 arise.

## 2020-03-25 ENCOUNTER — PATIENT OUTREACH (OUTPATIENT)
Dept: ADMINISTRATIVE | Facility: OTHER | Age: 30
End: 2020-03-25

## 2020-03-26 ENCOUNTER — OFFICE VISIT (OUTPATIENT)
Dept: OBSTETRICS AND GYNECOLOGY | Facility: CLINIC | Age: 30
End: 2020-03-26
Payer: COMMERCIAL

## 2020-03-26 PROCEDURE — 0503F POSTPARTUM CARE VISIT: CPT | Mod: 95,S$GLB,, | Performed by: OBSTETRICS & GYNECOLOGY

## 2020-03-26 PROCEDURE — 0503F PR POSTPARTUM CARE VISIT: ICD-10-PCS | Mod: 95,S$GLB,, | Performed by: OBSTETRICS & GYNECOLOGY

## 2020-03-26 NOTE — PROGRESS NOTES
HISTORY OF PRESENT ILLNESS:    Dora Hernandez is a 30 y.o. female  No LMP recorded. presents today for postpartum visit.     Patient reports today with complaints of Constipation   The patient location is: [place], [place]   The chief complaint leading to consultation is: Follow up visit for PP visit & constipation   Visit type: Virtual visit with synchronous audio and video  Total time spent with patient: 20 minutes (Over 50% of time is spent in counseling and coordination of care for constipation, PP care, leave form work, breastfeeding)     COVID-19 precautions discussed in detail, precautions given. Patient urged to visit [email or web address] for latest health updates on COVID-19.     Each patient to whom he or she provides medical services by telemedicine is:  (1) informed of the relationship between the physician and patient and the respective role of any other health care provider with respect to management of the patient; and (2) notified that he or she may decline to receive medical services by telemedicine and may withdraw from such care at any time    Breastfeeding     Past Medical History:   Diagnosis Date    Abnormal Pap smear     Anxiety     Depression     Migraine headache     Polycystic disease, ovaries        Past Surgical History:   Procedure Laterality Date    TYMPANOSTOMY         MEDICATIONS AND ALLERGIES:      Current Outpatient Medications:     ibuprofen (ADVIL,MOTRIN) 600 MG tablet, Take 1 tablet (600 mg total) by mouth every 6 (six) hours., Disp: 30 tablet, Rfl: 1    PNV no.95/ferrous fum/folic ac (PRENATAL ORAL), Take by mouth., Disp: , Rfl:     Review of patient's allergies indicates:   Allergen Reactions    Metformin Nausea And Vomiting       Family History   Problem Relation Age of Onset    Hypertension Father     Stroke Father     Thyroid disease Mother     Lung cancer Maternal Grandmother     Breast cancer Neg Hx     Colon cancer Neg Hx     Ovarian cancer Neg Hx         Social History     Socioeconomic History    Marital status:      Spouse name: Not on file    Number of children: Not on file    Years of education: Not on file    Highest education level: Not on file   Occupational History    Not on file   Social Needs    Financial resource strain: Somewhat hard    Food insecurity:     Worry: Sometimes true     Inability: Sometimes true    Transportation needs:     Medical: No     Non-medical: No   Tobacco Use    Smoking status: Never Smoker    Smokeless tobacco: Never Used   Substance and Sexual Activity    Alcohol use: Not Currently     Frequency: Monthly or less     Drinks per session: 1 or 2     Binge frequency: Never    Drug use: No    Sexual activity: Yes     Partners: Male     Birth control/protection: None   Lifestyle    Physical activity:     Days per week: 0 days     Minutes per session: 0 min    Stress: To some extent   Relationships    Social connections:     Talks on phone: More than three times a week     Gets together: More than three times a week     Attends Spiritism service: Not on file     Active member of club or organization: Yes     Attends meetings of clubs or organizations: More than 4 times per year     Relationship status:    Other Topics Concern    Not on file   Social History Narrative    Not on file       COMPREHENSIVE GYN HISTORY:  PAP History: Denies abnormal Paps.  Infection History: Denies STDs. Denies PID.  Benign History: Denies uterine fibroids. Denies ovarian cysts. Denies endometriosis. Denies other conditions.  Cancer History: Denies cervical cancer. Denies uterine cancer or hyperplasia. Denies ovarian cancer. Denies vulvar cancer or pre-cancer. Denies vaginal cancer or pre-cancer. Denies breast cancer. Denies colon cancer.  Sexual Activity History: Reports currently being sexually active  Menstrual History: Every 28 days, flows for 4 days. Light flow.  Dysmenorrhea History: Denies  dysmenorrhea.      ROS:  GENERAL: No weight changes. No swelling. No fatigue. No fever.  CARDIOVASCULAR: No chest pain. No shortness of breath. No leg cramps.   NEUROLOGICAL: No headaches. No vision changes.  BREASTS: No pain. No lumps. No discharge.  ABDOMEN: No pain. No nausea. No vomiting. No diarrhea. No constipation.  REPRODUCTIVE: No abnormal bleeding.   VULVA: No pain. No lesions. No itching.  VAGINA: No relaxation. No itching. No odor. No discharge. No lesions.  URINARY: No incontinence. No nocturia. No frequency. No dysuria.    PE:  APPEARANCE: Well nourished, well developed, in no acute distress.  AFFECT: WNL, alert and oriented x 3.  Deferred     DIAGNOSIS:  PP visit  Constipation   Breastfeeding       FOLLOW-UP with me in 4 weeks

## 2020-04-06 ENCOUNTER — PATIENT MESSAGE (OUTPATIENT)
Dept: OBSTETRICS AND GYNECOLOGY | Facility: CLINIC | Age: 30
End: 2020-04-06

## 2020-04-07 ENCOUNTER — TELEPHONE (OUTPATIENT)
Dept: OBSTETRICS AND GYNECOLOGY | Facility: CLINIC | Age: 30
End: 2020-04-07

## 2020-04-07 ENCOUNTER — PATIENT MESSAGE (OUTPATIENT)
Dept: OBSTETRICS AND GYNECOLOGY | Facility: CLINIC | Age: 30
End: 2020-04-07

## 2020-07-09 ENCOUNTER — LAB VISIT (OUTPATIENT)
Dept: PRIMARY CARE CLINIC | Facility: OTHER | Age: 30
End: 2020-07-09
Attending: INTERNAL MEDICINE
Payer: COMMERCIAL

## 2020-07-09 DIAGNOSIS — R19.7 DIARRHEA: ICD-10-CM

## 2020-07-09 DIAGNOSIS — R51.9 HEAD ACHE: ICD-10-CM

## 2020-07-09 PROCEDURE — U0003 INFECTIOUS AGENT DETECTION BY NUCLEIC ACID (DNA OR RNA); SEVERE ACUTE RESPIRATORY SYNDROME CORONAVIRUS 2 (SARS-COV-2) (CORONAVIRUS DISEASE [COVID-19]), AMPLIFIED PROBE TECHNIQUE, MAKING USE OF HIGH THROUGHPUT TECHNOLOGIES AS DESCRIBED BY CMS-2020-01-R: HCPCS

## 2020-07-12 LAB — SARS-COV-2 RNA RESP QL NAA+PROBE: NEGATIVE

## 2020-08-02 NOTE — PROGRESS NOTES
POSTPARTUM PROGRESS NOTE     Dora Hernandez is a 30 y.o. female PPD #1 status post Spontaneous vaginal delivery at 39w0d in a pregnancy complicated by polyhydramnios and NRFHT in PNT prior to admission. Patient is doing well this morning. She denies nausea, vomiting, fever or chills.  Patient reports mild abdominal pain that is adequately relieved by oral pain medications. Lochia is mild to moderate and stable. Patient is voiding without difficulty and ambulating with no difficulty. She has passed flatus.  Patient does plan to breast feed. Per primary OB for contraception. She desires circumcision.     Objective:       Temp:  [97.7 °F (36.5 °C)-98.1 °F (36.7 °C)] 98.1 °F (36.7 °C)  Pulse:  [] 97  Resp:  [18] 18  SpO2:  [88 %-100 %] 96 %  BP: (103-148)/(58-82) 117/66    General:   alert, appears stated age and cooperative   Lungs:   Non-labored respirations    Heart:   regular rate and rhythm   Abdomen:  Soft, nondistended    Uterus:  firm located at the umblicus.    Extremities: no pedal edema noted     Lab Review  Recent Labs   Lab 20  1227   WBC 5.36   HGB 12.1   HCT 39.2   MCV 81*           No results for input(s): NA, K, CL, CO2, BUN, CREATININE, GLU, PROT, BILITOT, ALKPHOS, ALT, AST, MG, PHOS in the last 168 hours.       I/O    Intake/Output Summary (Last 24 hours) at 2020 0424  Last data filed at 2020 2330  Gross per 24 hour   Intake --   Output 1225 ml   Net -1225 ml        Assessment:     Patient Active Problem List   Diagnosis    BMI 33.0-33.9,adult    Chronic pain of right ankle    Premenstrual dysphoric disorder    Depression    Prediabetes     (spontaneous vaginal delivery)        Plan:   1. Postpartum care:  - Patient doing well. Continue routine management and advances.  - Continue PO pain meds. Pain well controlled.  - Heme: H/h  >>> pending  - Encourage ambulation  - Circumcision desired- consents signed and to chart  - Contraception to be  discussed at 6 week pp visit  - Lactation consult PRN    2. H/o neurocardiogenic syncope  - Has had extensive workup in the past  - Currently asymptomatic    Dispo: As patient meets milestones, will plan to discharge PPD2.    Lakshmi Marcus M.D.   OB/GYN  PGY-1     Yes

## 2020-10-05 ENCOUNTER — PATIENT MESSAGE (OUTPATIENT)
Dept: ADMINISTRATIVE | Facility: HOSPITAL | Age: 30
End: 2020-10-05

## 2020-10-21 ENCOUNTER — HOSPITAL ENCOUNTER (EMERGENCY)
Facility: OTHER | Age: 30
Discharge: HOME | End: 2020-10-21
Attending: EMERGENCY MEDICINE
Payer: COMMERCIAL

## 2020-10-21 VITALS
DIASTOLIC BLOOD PRESSURE: 62 MMHG | BODY MASS INDEX: 33.74 KG/M2 | RESPIRATION RATE: 18 BRPM | WEIGHT: 215 LBS | TEMPERATURE: 99 F | SYSTOLIC BLOOD PRESSURE: 116 MMHG | OXYGEN SATURATION: 96 % | HEIGHT: 67 IN | HEART RATE: 92 BPM

## 2020-10-21 DIAGNOSIS — Z20.822 SUSPECTED COVID-19 VIRUS INFECTION: Primary | ICD-10-CM

## 2020-10-21 LAB
B-HCG UR QL: NEGATIVE
CTP QC/QA: YES
CTP QC/QA: YES
SARS-COV-2 RDRP RESP QL NAA+PROBE: NEGATIVE

## 2020-10-21 PROCEDURE — 25000003 PHARM REV CODE 250: Performed by: PHYSICIAN ASSISTANT

## 2020-10-21 PROCEDURE — 81025 URINE PREGNANCY TEST: CPT | Performed by: PHYSICIAN ASSISTANT

## 2020-10-21 PROCEDURE — 99283 EMERGENCY DEPT VISIT LOW MDM: CPT | Mod: 25

## 2020-10-21 PROCEDURE — U0002 COVID-19 LAB TEST NON-CDC: HCPCS | Performed by: PHYSICIAN ASSISTANT

## 2020-10-21 RX ORDER — ACETAMINOPHEN 500 MG
1000 TABLET ORAL
Status: COMPLETED | OUTPATIENT
Start: 2020-10-21 | End: 2020-10-21

## 2020-10-21 RX ADMIN — ACETAMINOPHEN 1000 MG: 500 TABLET, FILM COATED ORAL at 10:10

## 2020-10-21 NOTE — Clinical Note
"Dora "Dominic Hernandez was seen and treated in our emergency department on 10/21/2020.     COVID-19 is present in our communities across the state. There is limited testing for COVID at this time, so not all patients can be tested. In this situation, your employee meets the following criteria:    Dora Hernandez has met the criteria for COVID-19 testing and has a NEGATIVE result. The employee can return to work once they are asymptomatic for 72 hours without the use of fever reducing medications (Tylenol, Motrin, etc).     If you have any questions or concerns, or if I can be of further assistance, please do not hesitate to contact me.    Sincerely,             Shyam Caceres MD"

## 2020-10-21 NOTE — ED PROVIDER NOTES
Encounter Date: 10/21/2020       History     Chief Complaint   Patient presents with    COVID-19 Concerns     pt states  tested positive for covid and now she has sob and headache.      Afebrile 30-year-old female with PMH of abnormal Paps or, anxiety, depression, migraine headaches and PCOS presents to the ED for evaluation of viral illness symptoms.  Patient states that her  tested positive for COVID on Sunday.  She states that she began last night with some general of the symptoms.  She states that she has some fatigue and mild headache.  Patient did inform triggers that she has history of shortness of breath however denies this to myself.  She denies any additional complaints.  She has not tried any medications for the symptoms.    The history is provided by the patient.     Review of patient's allergies indicates:   Allergen Reactions    Metformin Nausea And Vomiting     Past Medical History:   Diagnosis Date    Abnormal Pap smear     Anxiety     Depression     Migraine headache     Polycystic disease, ovaries      Past Surgical History:   Procedure Laterality Date    TYMPANOSTOMY       Family History   Problem Relation Age of Onset    Hypertension Father     Stroke Father     Thyroid disease Mother     Lung cancer Maternal Grandmother     Breast cancer Neg Hx     Colon cancer Neg Hx     Ovarian cancer Neg Hx      Social History     Tobacco Use    Smoking status: Never Smoker    Smokeless tobacco: Never Used   Substance Use Topics    Alcohol use: Not Currently     Frequency: Monthly or less     Drinks per session: 1 or 2     Binge frequency: Never    Drug use: No     Review of Systems   Constitutional: Positive for fatigue. Negative for activity change, appetite change, chills and fever.   HENT: Negative for sore throat.    Respiratory: Negative for cough, chest tightness and shortness of breath.    Cardiovascular: Negative for chest pain, palpitations and leg swelling.    Gastrointestinal: Negative for nausea and vomiting.   Genitourinary: Negative for dysuria.   Musculoskeletal: Negative for arthralgias, back pain and myalgias.   Skin: Negative for rash.   Neurological: Positive for headaches. Negative for weakness.   Hematological: Does not bruise/bleed easily.   Psychiatric/Behavioral: Negative for confusion.       Physical Exam     Initial Vitals [10/21/20 1005]   BP Pulse Resp Temp SpO2   113/73 95 18 98.4 °F (36.9 °C) 97 %      MAP       --         Physical Exam    Nursing note and vitals reviewed.  Constitutional: Vital signs are normal. She appears well-developed and well-nourished. She is cooperative.  Non-toxic appearance. She does not appear ill. No distress.   HENT:   Head: Normocephalic and atraumatic.   Eyes: Conjunctivae and lids are normal.   Neck: Neck supple. No neck rigidity.   Cardiovascular: Normal rate and regular rhythm.   Pulmonary/Chest: Breath sounds normal. No respiratory distress. She has no wheezes. She has no rhonchi.   Abdominal: Soft. Normal appearance and bowel sounds are normal. There is no abdominal tenderness. There is no rigidity and no guarding.   Musculoskeletal: Normal range of motion.   Neurological: She is alert and oriented to person, place, and time. She has normal strength. No sensory deficit. GCS score is 15. GCS eye subscore is 4. GCS verbal subscore is 5. GCS motor subscore is 6.   Skin: Skin is warm, dry and intact. No rash noted.   Psychiatric: She has a normal mood and affect. Her speech is normal and behavior is normal. Thought content normal.         ED Course   Procedures  Labs Reviewed   SARS-COV-2 RDRP GENE    Narrative:     This test utilizes isothermal nucleic acid amplification   technology to detect the SARS-CoV-2 RdRp nucleic acid segment.   The analytical sensitivity (limit of detection) is 125 genome   equivalents/mL.   A POSITIVE result implies infection with the SARS-CoV-2 virus;   the patient is presumed to be  contagious.     A NEGATIVE result means that SARS-CoV-2 nucleic acids are not   present above the limit of detection. A NEGATIVE result should be   treated as presumptive. It does not rule out the possibility of   COVID-19 and should not be the sole basis for treatment decisions.   If COVID-19 is strongly suspected based on clinical and exposure   history, re-testing using an alternate molecular assay should be   considered.   This test is only for use under the Food and Drug   Administration s Emergency Use Authorization (EUA).   Commercial kits are provided by [employer].   Performance characteristics of the EUA have been independently   verified by [facility] Department of   Pathology and Laboratory Medicine.   _________________________________________________________________   The authorized Fact Sheet for Healthcare Providers and the authorized Fact   Sheet for Patients of the ID NOW COVID-19 are available on the FDA   website:     https://www.fda.gov/media/981965/download  https://www.fda.gov/media/738159/download             POCT URINE PREGNANCY          Imaging Results          X-Ray Chest AP Portable (Final result)  Result time 10/21/20 10:46:39    Final result by Jong Bass III, MD (10/21/20 10:46:39)                 Impression:      No acute process seen.      Electronically signed by: Jong Bass MD  Date:    10/21/2020  Time:    10:46             Narrative:    EXAMINATION:  XR CHEST AP PORTABLE    CLINICAL HISTORY:  Concern for COVID;    FINDINGS:  Heart size is normal.  Lungs are clear.  The bones show nothing unusual.                                 Medical Decision Making:   Initial Assessment:   Well-appearing female presents the ED for evaluation of general illness symptoms.  Physical exam grossly unremarkable.  Differential Diagnosis:   Differential Diagnosis includes, but is not limited to:  Sepsis, influenza, bacterial/viral pharyngitis, bacterial/viral pneumonia,  migraine headache.    Clinical Tests:   Lab Tests: Ordered and Reviewed  Radiological Study: Ordered and Reviewed  ED Management:  Rapid COVID is noted to be negative.  As she did report shortness of breath to triage chest x-ray was obtained which is also unremarkable.  Patient remained stable with no signs of respiratory distress and nontoxic.  She has no abnormalities on her physical exam.  We did discuss symptomatic care for viral illnesses and these are typically self-limiting.  Patient felt reassured that she tested negative.  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 Strict instructions to follow up with primary care physician or reference provided for further assessment and evaluation. Given instructions to return for any acute symptoms and verbalized understanding of this medical plan.                               Clinical Impression:     ICD-10-CM ICD-9-CM   1. Suspected COVID-19 virus infection  Z20.828 V01.79                          ED Disposition Condition    Discharge Stable        ED Prescriptions     None        Follow-up Information     Follow up With Specialties Details Why Contact Info    Janna Centeno MD Internal Medicine Go to   [address] [facility] [address]  [phone]                                         RAMO Rivero  10/21/20 9595

## 2020-10-21 NOTE — ED TRIAGE NOTES
"Pt reports  tested positive for covid on Sunday. Pt reports past couple of days feeling "ill" pt denies sob, fever, chills, n/v/d, cp   "

## 2020-10-21 NOTE — FIRST PROVIDER EVALUATION
Emergency Department TeleTriage Encounter Note      CHIEF COMPLAINT    Chief Complaint   Patient presents with    COVID-19 Concerns     pt states  tested positive for covid and now she has sob and headache.        VITAL SIGNS   Initial Vitals [10/21/20 1005]   BP Pulse Resp Temp SpO2   113/73 95 18 98.4 °F (36.9 °C) 97 %      MAP       --            ALLERGIES    Review of patient's allergies indicates:   Allergen Reactions    Metformin Nausea And Vomiting       PROVIDER TRIAGE NOTE  HPI: Dora Hernandez, a 30 y.o. female presents to the ED evaluation of headache that has been intermittent since last weekend with some SOB with exertion.   recently tested positive for COVID.        ORDERS  Labs Reviewed   SARS-COV-2 RDRP GENE       ED Orders (720h ago, onward)    Start Ordered     Status Ordering Provider    10/21/20 1013 10/21/20 1013  POCT COVID-19 Rapid Screening  Once      Ordered KELSI VEGA            Virtual Visit Note: The provider triage portion of this emergency department evaluation and documentation was performed via [employer], a HIPAA-compliant telemedicine application, in concert with a tele-presenter in the room. A face to face patient evaluation with one of my colleagues will occur once the patient is placed in an emergency department room.      DISCLAIMER: This note was prepared with [employer]) voice recognition transcription software. Garbled syntax, mangled pronouns, and other bizarre constructions may be attributed to that software system.

## 2020-10-21 NOTE — Clinical Note
"Dora Hernandez (Dalila) was seen and treated in our emergency department on 10/21/2020.     COVID-19 is present in our communities across the state. There is limited testing for COVID at this time, so not all patients can be tested. In this situation, your employee meets the following criteria:    Dora Hernandez has met the criteria for COVID-19 testing based upon symptoms, travel, and/or potential exposure. The test has been completed and is pending results at this time. During this time the employee is not able to work and should be quarantined per the Centers for Disease Control timelines.     If you have any questions or concerns, or if I can be of further assistance, please do not hesitate to contact me.    Sincerely,             Shyam Caceres MD"

## 2020-10-27 ENCOUNTER — TELEPHONE (OUTPATIENT)
Dept: INTERNAL MEDICINE | Facility: CLINIC | Age: 30
End: 2020-10-27

## 2021-01-04 ENCOUNTER — PATIENT MESSAGE (OUTPATIENT)
Dept: ADMINISTRATIVE | Facility: HOSPITAL | Age: 31
End: 2021-01-04

## 2021-04-05 ENCOUNTER — PATIENT MESSAGE (OUTPATIENT)
Dept: ADMINISTRATIVE | Facility: HOSPITAL | Age: 31
End: 2021-04-05

## 2021-04-16 ENCOUNTER — PATIENT MESSAGE (OUTPATIENT)
Dept: RESEARCH | Facility: HOSPITAL | Age: 31
End: 2021-04-16

## 2021-05-24 ENCOUNTER — TELEPHONE (OUTPATIENT)
Dept: OBSTETRICS AND GYNECOLOGY | Facility: CLINIC | Age: 31
End: 2021-05-24

## 2021-05-27 NOTE — PATIENT INSTRUCTIONS
JA Gipson, FNP   [address] [facility] [address]   Phone: [phone]   Fax: [phone]       
[facility]

## 2021-07-06 ENCOUNTER — PATIENT MESSAGE (OUTPATIENT)
Dept: ADMINISTRATIVE | Facility: HOSPITAL | Age: 31
End: 2021-07-06

## 2021-07-20 ENCOUNTER — PATIENT MESSAGE (OUTPATIENT)
Dept: INTERNAL MEDICINE | Facility: CLINIC | Age: 31
End: 2021-07-20

## 2021-07-20 ENCOUNTER — HOSPITAL ENCOUNTER (OUTPATIENT)
Dept: RADIOLOGY | Facility: HOSPITAL | Age: 31
Discharge: HOME | End: 2021-07-20
Attending: INTERNAL MEDICINE
Payer: COMMERCIAL

## 2021-07-20 ENCOUNTER — OFFICE VISIT (OUTPATIENT)
Dept: INTERNAL MEDICINE | Facility: CLINIC | Age: 31
End: 2021-07-20
Payer: COMMERCIAL

## 2021-07-20 VITALS
HEART RATE: 76 BPM | RESPIRATION RATE: 18 BRPM | HEIGHT: 67 IN | OXYGEN SATURATION: 99 % | SYSTOLIC BLOOD PRESSURE: 92 MMHG | BODY MASS INDEX: 38.3 KG/M2 | TEMPERATURE: 98 F | WEIGHT: 244.06 LBS | DIASTOLIC BLOOD PRESSURE: 62 MMHG

## 2021-07-20 DIAGNOSIS — R06.02 SOB (SHORTNESS OF BREATH): ICD-10-CM

## 2021-07-20 DIAGNOSIS — F32.A ANXIETY AND DEPRESSION: ICD-10-CM

## 2021-07-20 DIAGNOSIS — R53.83 FATIGUE, UNSPECIFIED TYPE: ICD-10-CM

## 2021-07-20 DIAGNOSIS — R05.3 CHRONIC COUGHING: ICD-10-CM

## 2021-07-20 DIAGNOSIS — M25.571 CHRONIC ANKLE PAIN, BILATERAL: ICD-10-CM

## 2021-07-20 DIAGNOSIS — F41.9 ANXIETY AND DEPRESSION: ICD-10-CM

## 2021-07-20 DIAGNOSIS — G89.29 CHRONIC ANKLE PAIN, BILATERAL: ICD-10-CM

## 2021-07-20 DIAGNOSIS — L03.012 PARONYCHIA OF LEFT MIDDLE FINGER: ICD-10-CM

## 2021-07-20 DIAGNOSIS — Z00.00 ANNUAL PHYSICAL EXAM: Primary | ICD-10-CM

## 2021-07-20 DIAGNOSIS — R79.89 ABNORMAL CBC: ICD-10-CM

## 2021-07-20 DIAGNOSIS — M25.572 CHRONIC ANKLE PAIN, BILATERAL: ICD-10-CM

## 2021-07-20 PROCEDURE — 1125F PR PAIN SEVERITY QUANTIFIED, PAIN PRESENT: ICD-10-PCS | Mod: CPTII,S$GLB,, | Performed by: INTERNAL MEDICINE

## 2021-07-20 PROCEDURE — 1125F AMNT PAIN NOTED PAIN PRSNT: CPT | Mod: CPTII,S$GLB,, | Performed by: INTERNAL MEDICINE

## 2021-07-20 PROCEDURE — 99395 PREV VISIT EST AGE 18-39: CPT | Mod: S$GLB,,, | Performed by: INTERNAL MEDICINE

## 2021-07-20 PROCEDURE — 71046 X-RAY EXAM CHEST 2 VIEWS: CPT | Mod: 26,,, | Performed by: RADIOLOGY

## 2021-07-20 PROCEDURE — 99395 PR PREVENTIVE VISIT,EST,18-39: ICD-10-PCS | Mod: S$GLB,,, | Performed by: INTERNAL MEDICINE

## 2021-07-20 PROCEDURE — 71046 X-RAY EXAM CHEST 2 VIEWS: CPT | Mod: TC,PO

## 2021-07-20 PROCEDURE — 71046 XR CHEST PA AND LATERAL: ICD-10-PCS | Mod: 26,,, | Performed by: RADIOLOGY

## 2021-07-20 PROCEDURE — 3008F PR BODY MASS INDEX (BMI) DOCUMENTED: ICD-10-PCS | Mod: CPTII,S$GLB,, | Performed by: INTERNAL MEDICINE

## 2021-07-20 PROCEDURE — 3008F BODY MASS INDEX DOCD: CPT | Mod: CPTII,S$GLB,, | Performed by: INTERNAL MEDICINE

## 2021-07-20 PROCEDURE — 99999 PR PBB SHADOW E&M-EST. PATIENT-LVL IV: CPT | Mod: PBBFAC,,, | Performed by: INTERNAL MEDICINE

## 2021-07-20 PROCEDURE — 99999 PR PBB SHADOW E&M-EST. PATIENT-LVL IV: ICD-10-PCS | Mod: PBBFAC,,, | Performed by: INTERNAL MEDICINE

## 2021-07-20 RX ORDER — FLUOXETINE 10 MG/1
10 CAPSULE ORAL DAILY
Qty: 30 CAPSULE | Refills: 0 | Status: SHIPPED | OUTPATIENT
Start: 2021-07-20 | End: 2021-09-10

## 2021-07-21 PROBLEM — E55.9 VITAMIN D INSUFFICIENCY: Status: ACTIVE | Noted: 2021-07-21

## 2021-07-21 RX ORDER — ERGOCALCIFEROL 1.25 MG/1
50000 CAPSULE ORAL
Qty: 12 CAPSULE | Refills: 0 | Status: SHIPPED | OUTPATIENT
Start: 2021-07-21 | End: 2021-09-10 | Stop reason: SDUPTHER

## 2021-08-03 ENCOUNTER — PATIENT MESSAGE (OUTPATIENT)
Dept: INTERNAL MEDICINE | Facility: CLINIC | Age: 31
End: 2021-08-03

## 2021-08-05 ENCOUNTER — CLINICAL SUPPORT (OUTPATIENT)
Dept: URGENT CARE | Facility: CLINIC | Age: 31
End: 2021-08-05
Payer: COMMERCIAL

## 2021-08-05 ENCOUNTER — TELEPHONE (OUTPATIENT)
Dept: INTERNAL MEDICINE | Facility: CLINIC | Age: 31
End: 2021-08-05

## 2021-08-05 DIAGNOSIS — Z11.52 ENCOUNTER FOR SCREENING FOR COVID-19: Primary | ICD-10-CM

## 2021-08-05 DIAGNOSIS — R05.3 CHRONIC COUGH: Primary | ICD-10-CM

## 2021-08-05 LAB
CTP QC/QA: YES
SARS-COV-2 RDRP RESP QL NAA+PROBE: NEGATIVE

## 2021-08-05 PROCEDURE — U0002: ICD-10-PCS | Mod: QW,S$GLB,, | Performed by: EMERGENCY MEDICINE

## 2021-08-05 PROCEDURE — U0002 COVID-19 LAB TEST NON-CDC: HCPCS | Mod: QW,S$GLB,, | Performed by: EMERGENCY MEDICINE

## 2021-08-26 ENCOUNTER — HOSPITAL ENCOUNTER (EMERGENCY)
Facility: OTHER | Age: 31
Discharge: HOME | End: 2021-08-26
Attending: EMERGENCY MEDICINE
Payer: COMMERCIAL

## 2021-08-26 VITALS
HEIGHT: 67 IN | RESPIRATION RATE: 16 BRPM | TEMPERATURE: 98 F | OXYGEN SATURATION: 100 % | HEART RATE: 68 BPM | BODY MASS INDEX: 36.88 KG/M2 | SYSTOLIC BLOOD PRESSURE: 127 MMHG | WEIGHT: 235 LBS | DIASTOLIC BLOOD PRESSURE: 80 MMHG

## 2021-08-26 DIAGNOSIS — T14.90XA INJURY: ICD-10-CM

## 2021-08-26 DIAGNOSIS — M25.519 SHOULDER PAIN: ICD-10-CM

## 2021-08-26 DIAGNOSIS — S43.004A DISLOCATION OF RIGHT SHOULDER JOINT, INITIAL ENCOUNTER: Primary | ICD-10-CM

## 2021-08-26 PROCEDURE — 99152 MOD SED SAME PHYS/QHP 5/>YRS: CPT

## 2021-08-26 PROCEDURE — 63600175 PHARM REV CODE 636 W HCPCS: Performed by: EMERGENCY MEDICINE

## 2021-08-26 PROCEDURE — 99153 MOD SED SAME PHYS/QHP EA: CPT

## 2021-08-26 PROCEDURE — 99284 EMERGENCY DEPT VISIT MOD MDM: CPT | Mod: 25

## 2021-08-26 PROCEDURE — 96375 TX/PRO/DX INJ NEW DRUG ADDON: CPT

## 2021-08-26 PROCEDURE — 25000003 PHARM REV CODE 250: Performed by: EMERGENCY MEDICINE

## 2021-08-26 PROCEDURE — 23650 CLTX SHO DSLC W/MNPJ WO ANES: CPT | Mod: RT

## 2021-08-26 PROCEDURE — 96374 THER/PROPH/DIAG INJ IV PUSH: CPT | Mod: 59

## 2021-08-26 RX ORDER — KETAMINE HYDROCHLORIDE 50 MG/ML
100 INJECTION, SOLUTION INTRAMUSCULAR; INTRAVENOUS
Status: COMPLETED | OUTPATIENT
Start: 2021-08-26 | End: 2021-08-26

## 2021-08-26 RX ORDER — KETAMINE HYDROCHLORIDE 50 MG/ML
200 INJECTION, SOLUTION INTRAMUSCULAR; INTRAVENOUS
Status: DISCONTINUED | OUTPATIENT
Start: 2021-08-26 | End: 2021-08-26

## 2021-08-26 RX ORDER — HYDROMORPHONE HYDROCHLORIDE 1 MG/ML
0.5 INJECTION, SOLUTION INTRAMUSCULAR; INTRAVENOUS; SUBCUTANEOUS
Status: COMPLETED | OUTPATIENT
Start: 2021-08-26 | End: 2021-08-26

## 2021-08-26 RX ORDER — KETOROLAC TROMETHAMINE 30 MG/ML
15 INJECTION, SOLUTION INTRAMUSCULAR; INTRAVENOUS
Status: COMPLETED | OUTPATIENT
Start: 2021-08-26 | End: 2021-08-26

## 2021-08-26 RX ORDER — HYDROCODONE BITARTRATE AND ACETAMINOPHEN 5; 325 MG/1; MG/1
1 TABLET ORAL EVERY 6 HOURS PRN
Qty: 10 TABLET | Refills: 0 | Status: SHIPPED | OUTPATIENT
Start: 2021-08-26 | End: 2021-09-10

## 2021-08-26 RX ADMIN — HYDROMORPHONE HYDROCHLORIDE 0.5 MG: 1 INJECTION, SOLUTION INTRAMUSCULAR; INTRAVENOUS; SUBCUTANEOUS at 06:08

## 2021-08-26 RX ADMIN — KETOROLAC TROMETHAMINE 15 MG: 30 INJECTION, SOLUTION INTRAMUSCULAR; INTRAVENOUS at 06:08

## 2021-08-26 RX ADMIN — KETAMINE HYDROCHLORIDE 100 MG: 50 INJECTION, SOLUTION INTRAMUSCULAR; INTRAVENOUS at 07:08

## 2021-09-10 ENCOUNTER — PATIENT MESSAGE (OUTPATIENT)
Dept: INTERNAL MEDICINE | Facility: CLINIC | Age: 31
End: 2021-09-10

## 2021-09-10 ENCOUNTER — OFFICE VISIT (OUTPATIENT)
Dept: INTERNAL MEDICINE | Facility: CLINIC | Age: 31
End: 2021-09-10
Payer: COMMERCIAL

## 2021-09-10 ENCOUNTER — HOSPITAL ENCOUNTER (OUTPATIENT)
Dept: RADIOLOGY | Facility: HOSPITAL | Age: 31
Discharge: HOME | End: 2021-09-10
Attending: INTERNAL MEDICINE
Payer: COMMERCIAL

## 2021-09-10 VITALS
HEART RATE: 66 BPM | HEIGHT: 67 IN | SYSTOLIC BLOOD PRESSURE: 100 MMHG | BODY MASS INDEX: 35.88 KG/M2 | WEIGHT: 228.63 LBS | DIASTOLIC BLOOD PRESSURE: 72 MMHG | RESPIRATION RATE: 18 BRPM | OXYGEN SATURATION: 98 % | TEMPERATURE: 98 F

## 2021-09-10 DIAGNOSIS — M25.511 ACUTE PAIN OF RIGHT SHOULDER: Primary | ICD-10-CM

## 2021-09-10 DIAGNOSIS — M25.571 CHRONIC PAIN OF RIGHT ANKLE: ICD-10-CM

## 2021-09-10 DIAGNOSIS — E55.9 VITAMIN D INSUFFICIENCY: ICD-10-CM

## 2021-09-10 DIAGNOSIS — G89.29 CHRONIC PAIN OF RIGHT ANKLE: ICD-10-CM

## 2021-09-10 DIAGNOSIS — F41.9 ANXIETY AND DEPRESSION: ICD-10-CM

## 2021-09-10 DIAGNOSIS — F32.A ANXIETY AND DEPRESSION: ICD-10-CM

## 2021-09-10 DIAGNOSIS — Z91.81 HISTORY OF RECENT FALL: ICD-10-CM

## 2021-09-10 PROCEDURE — 99999 PR PBB SHADOW E&M-EST. PATIENT-LVL IV: CPT | Mod: PBBFAC,,, | Performed by: INTERNAL MEDICINE

## 2021-09-10 PROCEDURE — 73610 X-RAY EXAM OF ANKLE: CPT | Mod: TC,RT

## 2021-09-10 PROCEDURE — 3008F BODY MASS INDEX DOCD: CPT | Mod: CPTII,S$GLB,, | Performed by: INTERNAL MEDICINE

## 2021-09-10 PROCEDURE — 1159F MED LIST DOCD IN RCRD: CPT | Mod: CPTII,S$GLB,, | Performed by: INTERNAL MEDICINE

## 2021-09-10 PROCEDURE — 3074F SYST BP LT 130 MM HG: CPT | Mod: CPTII,S$GLB,, | Performed by: INTERNAL MEDICINE

## 2021-09-10 PROCEDURE — 1160F PR REVIEW ALL MEDS BY PRESCRIBER/CLIN PHARMACIST DOCUMENTED: ICD-10-PCS | Mod: CPTII,S$GLB,, | Performed by: INTERNAL MEDICINE

## 2021-09-10 PROCEDURE — 3044F PR MOST RECENT HEMOGLOBIN A1C LEVEL <7.0%: ICD-10-PCS | Mod: CPTII,S$GLB,, | Performed by: INTERNAL MEDICINE

## 2021-09-10 PROCEDURE — 1160F RVW MEDS BY RX/DR IN RCRD: CPT | Mod: CPTII,S$GLB,, | Performed by: INTERNAL MEDICINE

## 2021-09-10 PROCEDURE — 3078F DIAST BP <80 MM HG: CPT | Mod: CPTII,S$GLB,, | Performed by: INTERNAL MEDICINE

## 2021-09-10 PROCEDURE — 99214 PR OFFICE/OUTPT VISIT, EST, LEVL IV, 30-39 MIN: ICD-10-PCS | Mod: S$GLB,,, | Performed by: INTERNAL MEDICINE

## 2021-09-10 PROCEDURE — 99999 PR PBB SHADOW E&M-EST. PATIENT-LVL IV: ICD-10-PCS | Mod: PBBFAC,,, | Performed by: INTERNAL MEDICINE

## 2021-09-10 PROCEDURE — 3008F PR BODY MASS INDEX (BMI) DOCUMENTED: ICD-10-PCS | Mod: CPTII,S$GLB,, | Performed by: INTERNAL MEDICINE

## 2021-09-10 PROCEDURE — 3078F PR MOST RECENT DIASTOLIC BLOOD PRESSURE < 80 MM HG: ICD-10-PCS | Mod: CPTII,S$GLB,, | Performed by: INTERNAL MEDICINE

## 2021-09-10 PROCEDURE — 73610 XR ANKLE COMPLETE 3 VIEW RIGHT: ICD-10-PCS | Mod: 26,RT,, | Performed by: RADIOLOGY

## 2021-09-10 PROCEDURE — 73610 X-RAY EXAM OF ANKLE: CPT | Mod: 26,RT,, | Performed by: RADIOLOGY

## 2021-09-10 PROCEDURE — 1159F PR MEDICATION LIST DOCUMENTED IN MEDICAL RECORD: ICD-10-PCS | Mod: CPTII,S$GLB,, | Performed by: INTERNAL MEDICINE

## 2021-09-10 PROCEDURE — 3074F PR MOST RECENT SYSTOLIC BLOOD PRESSURE < 130 MM HG: ICD-10-PCS | Mod: CPTII,S$GLB,, | Performed by: INTERNAL MEDICINE

## 2021-09-10 PROCEDURE — 99214 OFFICE O/P EST MOD 30 MIN: CPT | Mod: S$GLB,,, | Performed by: INTERNAL MEDICINE

## 2021-09-10 PROCEDURE — 3044F HG A1C LEVEL LT 7.0%: CPT | Mod: CPTII,S$GLB,, | Performed by: INTERNAL MEDICINE

## 2021-09-10 RX ORDER — FLUOXETINE 10 MG/1
CAPSULE ORAL
Qty: 53 CAPSULE | Refills: 0 | Status: SHIPPED | OUTPATIENT
Start: 2021-09-10 | End: 2022-09-26

## 2021-09-10 RX ORDER — DICLOFENAC SODIUM 50 MG/1
50 TABLET, DELAYED RELEASE ORAL 2 TIMES DAILY PRN
Qty: 30 TABLET | Refills: 0 | Status: SHIPPED | OUTPATIENT
Start: 2021-09-10 | End: 2022-09-26

## 2021-09-10 RX ORDER — ERGOCALCIFEROL 1.25 MG/1
50000 CAPSULE ORAL
Qty: 12 CAPSULE | Refills: 0 | Status: SHIPPED | OUTPATIENT
Start: 2021-09-10 | End: 2022-09-26 | Stop reason: SDUPTHER

## 2021-09-13 ENCOUNTER — OFFICE VISIT (OUTPATIENT)
Dept: ORTHOPEDICS | Facility: CLINIC | Age: 31
End: 2021-09-13
Payer: COMMERCIAL

## 2021-09-13 ENCOUNTER — HOSPITAL ENCOUNTER (OUTPATIENT)
Dept: RADIOLOGY | Facility: HOSPITAL | Age: 31
Discharge: HOME | End: 2021-09-13
Attending: PHYSICIAN ASSISTANT
Payer: COMMERCIAL

## 2021-09-13 VITALS — HEIGHT: 67 IN | BODY MASS INDEX: 35.82 KG/M2 | WEIGHT: 228.19 LBS

## 2021-09-13 DIAGNOSIS — S43.014A ANTERIOR SHOULDER DISLOCATION, RIGHT, INITIAL ENCOUNTER: Primary | ICD-10-CM

## 2021-09-13 DIAGNOSIS — S43.014A ANTERIOR SHOULDER DISLOCATION, RIGHT, INITIAL ENCOUNTER: ICD-10-CM

## 2021-09-13 PROCEDURE — 3044F PR MOST RECENT HEMOGLOBIN A1C LEVEL <7.0%: ICD-10-PCS | Mod: CPTII,S$GLB,, | Performed by: PHYSICIAN ASSISTANT

## 2021-09-13 PROCEDURE — 1160F RVW MEDS BY RX/DR IN RCRD: CPT | Mod: CPTII,S$GLB,, | Performed by: PHYSICIAN ASSISTANT

## 2021-09-13 PROCEDURE — 3008F BODY MASS INDEX DOCD: CPT | Mod: CPTII,S$GLB,, | Performed by: PHYSICIAN ASSISTANT

## 2021-09-13 PROCEDURE — 99203 OFFICE O/P NEW LOW 30 MIN: CPT | Mod: S$GLB,,, | Performed by: PHYSICIAN ASSISTANT

## 2021-09-13 PROCEDURE — 73030 X-RAY EXAM OF SHOULDER: CPT | Mod: TC,RT

## 2021-09-13 PROCEDURE — 99999 PR PBB SHADOW E&M-EST. PATIENT-LVL III: ICD-10-PCS | Mod: PBBFAC,,, | Performed by: PHYSICIAN ASSISTANT

## 2021-09-13 PROCEDURE — 1160F PR REVIEW ALL MEDS BY PRESCRIBER/CLIN PHARMACIST DOCUMENTED: ICD-10-PCS | Mod: CPTII,S$GLB,, | Performed by: PHYSICIAN ASSISTANT

## 2021-09-13 PROCEDURE — 3044F HG A1C LEVEL LT 7.0%: CPT | Mod: CPTII,S$GLB,, | Performed by: PHYSICIAN ASSISTANT

## 2021-09-13 PROCEDURE — 1159F PR MEDICATION LIST DOCUMENTED IN MEDICAL RECORD: ICD-10-PCS | Mod: CPTII,S$GLB,, | Performed by: PHYSICIAN ASSISTANT

## 2021-09-13 PROCEDURE — 99203 PR OFFICE/OUTPT VISIT, NEW, LEVL III, 30-44 MIN: ICD-10-PCS | Mod: S$GLB,,, | Performed by: PHYSICIAN ASSISTANT

## 2021-09-13 PROCEDURE — 73030 X-RAY EXAM OF SHOULDER: CPT | Mod: 26,RT,, | Performed by: RADIOLOGY

## 2021-09-13 PROCEDURE — 3008F PR BODY MASS INDEX (BMI) DOCUMENTED: ICD-10-PCS | Mod: CPTII,S$GLB,, | Performed by: PHYSICIAN ASSISTANT

## 2021-09-13 PROCEDURE — 1159F MED LIST DOCD IN RCRD: CPT | Mod: CPTII,S$GLB,, | Performed by: PHYSICIAN ASSISTANT

## 2021-09-13 PROCEDURE — 73030 XR SHOULDER COMPLETE 2 OR MORE VIEWS RIGHT: ICD-10-PCS | Mod: 26,RT,, | Performed by: RADIOLOGY

## 2021-09-13 PROCEDURE — 99999 PR PBB SHADOW E&M-EST. PATIENT-LVL III: CPT | Mod: PBBFAC,,, | Performed by: PHYSICIAN ASSISTANT

## 2021-09-16 ENCOUNTER — CLINICAL SUPPORT (OUTPATIENT)
Dept: REHABILITATION | Facility: HOSPITAL | Age: 31
End: 2021-09-16
Payer: COMMERCIAL

## 2021-09-16 DIAGNOSIS — S43.014A ANTERIOR SHOULDER DISLOCATION, RIGHT, INITIAL ENCOUNTER: ICD-10-CM

## 2021-09-16 DIAGNOSIS — M25.511 ACUTE PAIN OF RIGHT SHOULDER: ICD-10-CM

## 2021-09-16 DIAGNOSIS — M25.611 DECREASED RANGE OF MOTION OF RIGHT SHOULDER: ICD-10-CM

## 2021-09-16 PROCEDURE — 97161 PT EVAL LOW COMPLEX 20 MIN: CPT | Mod: PO

## 2021-09-16 PROCEDURE — 97110 THERAPEUTIC EXERCISES: CPT | Mod: PO

## 2021-09-21 ENCOUNTER — PATIENT MESSAGE (OUTPATIENT)
Dept: INTERNAL MEDICINE | Facility: CLINIC | Age: 31
End: 2021-09-21

## 2021-09-27 ENCOUNTER — CLINICAL SUPPORT (OUTPATIENT)
Dept: REHABILITATION | Facility: HOSPITAL | Age: 31
End: 2021-09-27
Payer: COMMERCIAL

## 2021-09-27 DIAGNOSIS — M25.611 DECREASED RANGE OF MOTION OF RIGHT SHOULDER: ICD-10-CM

## 2021-09-27 DIAGNOSIS — M25.511 ACUTE PAIN OF RIGHT SHOULDER: ICD-10-CM

## 2021-09-27 PROCEDURE — 97110 THERAPEUTIC EXERCISES: CPT | Mod: PO

## 2021-09-27 PROCEDURE — 97140 MANUAL THERAPY 1/> REGIONS: CPT | Mod: PO

## 2021-09-27 PROCEDURE — 97112 NEUROMUSCULAR REEDUCATION: CPT | Mod: PO

## 2021-10-01 ENCOUNTER — CLINICAL SUPPORT (OUTPATIENT)
Dept: REHABILITATION | Facility: HOSPITAL | Age: 31
End: 2021-10-01
Payer: COMMERCIAL

## 2021-10-01 DIAGNOSIS — M25.511 ACUTE PAIN OF RIGHT SHOULDER: ICD-10-CM

## 2021-10-01 DIAGNOSIS — M25.611 DECREASED RANGE OF MOTION OF RIGHT SHOULDER: ICD-10-CM

## 2021-10-01 PROCEDURE — 97110 THERAPEUTIC EXERCISES: CPT | Mod: PO

## 2021-10-13 ENCOUNTER — CLINICAL SUPPORT (OUTPATIENT)
Dept: REHABILITATION | Facility: HOSPITAL | Age: 31
End: 2021-10-13
Payer: COMMERCIAL

## 2021-10-13 DIAGNOSIS — M25.511 ACUTE PAIN OF RIGHT SHOULDER: ICD-10-CM

## 2021-10-13 DIAGNOSIS — M25.611 DECREASED RANGE OF MOTION OF RIGHT SHOULDER: ICD-10-CM

## 2021-10-13 PROCEDURE — 97110 THERAPEUTIC EXERCISES: CPT | Mod: PO

## 2021-10-13 PROCEDURE — 97112 NEUROMUSCULAR REEDUCATION: CPT | Mod: PO

## 2021-10-13 PROCEDURE — 97140 MANUAL THERAPY 1/> REGIONS: CPT | Mod: PO

## 2021-10-20 ENCOUNTER — CLINICAL SUPPORT (OUTPATIENT)
Dept: REHABILITATION | Facility: HOSPITAL | Age: 31
End: 2021-10-20
Payer: COMMERCIAL

## 2021-10-20 DIAGNOSIS — M25.511 ACUTE PAIN OF RIGHT SHOULDER: ICD-10-CM

## 2021-10-20 DIAGNOSIS — M25.611 DECREASED RANGE OF MOTION OF RIGHT SHOULDER: ICD-10-CM

## 2021-10-20 PROCEDURE — 97110 THERAPEUTIC EXERCISES: CPT | Mod: PO

## 2021-10-20 PROCEDURE — 97140 MANUAL THERAPY 1/> REGIONS: CPT | Mod: PO

## 2021-10-21 ENCOUNTER — PATIENT MESSAGE (OUTPATIENT)
Dept: INTERNAL MEDICINE | Facility: CLINIC | Age: 31
End: 2021-10-21

## 2021-10-22 ENCOUNTER — CLINICAL SUPPORT (OUTPATIENT)
Dept: REHABILITATION | Facility: HOSPITAL | Age: 31
End: 2021-10-22
Payer: COMMERCIAL

## 2021-10-22 DIAGNOSIS — M25.611 DECREASED RANGE OF MOTION OF RIGHT SHOULDER: ICD-10-CM

## 2021-10-22 DIAGNOSIS — M25.511 ACUTE PAIN OF RIGHT SHOULDER: ICD-10-CM

## 2021-10-22 PROCEDURE — 97112 NEUROMUSCULAR REEDUCATION: CPT | Mod: PO

## 2021-10-22 PROCEDURE — 97110 THERAPEUTIC EXERCISES: CPT | Mod: PO

## 2021-11-01 ENCOUNTER — DOCUMENTATION ONLY (OUTPATIENT)
Dept: REHABILITATION | Facility: HOSPITAL | Age: 31
End: 2021-11-01
Payer: COMMERCIAL

## 2021-11-01 ENCOUNTER — OFFICE VISIT (OUTPATIENT)
Dept: ORTHOPEDICS | Facility: CLINIC | Age: 31
End: 2021-11-01
Payer: COMMERCIAL

## 2021-11-01 VITALS — BODY MASS INDEX: 36.13 KG/M2 | WEIGHT: 230.19 LBS | HEIGHT: 67 IN

## 2021-11-01 DIAGNOSIS — S43.014D CLOSED ANTERIOR DISLOCATION OF RIGHT SHOULDER, SUBSEQUENT ENCOUNTER: Primary | ICD-10-CM

## 2021-11-01 PROCEDURE — 1160F RVW MEDS BY RX/DR IN RCRD: CPT | Mod: CPTII,S$GLB,, | Performed by: PHYSICIAN ASSISTANT

## 2021-11-01 PROCEDURE — 99213 PR OFFICE/OUTPT VISIT, EST, LEVL III, 20-29 MIN: ICD-10-PCS | Mod: S$GLB,,, | Performed by: PHYSICIAN ASSISTANT

## 2021-11-01 PROCEDURE — 1159F MED LIST DOCD IN RCRD: CPT | Mod: CPTII,S$GLB,, | Performed by: PHYSICIAN ASSISTANT

## 2021-11-01 PROCEDURE — 3044F HG A1C LEVEL LT 7.0%: CPT | Mod: CPTII,S$GLB,, | Performed by: PHYSICIAN ASSISTANT

## 2021-11-01 PROCEDURE — 99213 OFFICE O/P EST LOW 20 MIN: CPT | Mod: S$GLB,,, | Performed by: PHYSICIAN ASSISTANT

## 2021-11-01 PROCEDURE — 3044F PR MOST RECENT HEMOGLOBIN A1C LEVEL <7.0%: ICD-10-PCS | Mod: CPTII,S$GLB,, | Performed by: PHYSICIAN ASSISTANT

## 2021-11-01 PROCEDURE — 1159F PR MEDICATION LIST DOCUMENTED IN MEDICAL RECORD: ICD-10-PCS | Mod: CPTII,S$GLB,, | Performed by: PHYSICIAN ASSISTANT

## 2021-11-01 PROCEDURE — 99999 PR PBB SHADOW E&M-EST. PATIENT-LVL III: ICD-10-PCS | Mod: PBBFAC,,, | Performed by: PHYSICIAN ASSISTANT

## 2021-11-01 PROCEDURE — 99999 PR PBB SHADOW E&M-EST. PATIENT-LVL III: CPT | Mod: PBBFAC,,, | Performed by: PHYSICIAN ASSISTANT

## 2021-11-01 PROCEDURE — 3008F PR BODY MASS INDEX (BMI) DOCUMENTED: ICD-10-PCS | Mod: CPTII,S$GLB,, | Performed by: PHYSICIAN ASSISTANT

## 2021-11-01 PROCEDURE — 1160F PR REVIEW ALL MEDS BY PRESCRIBER/CLIN PHARMACIST DOCUMENTED: ICD-10-PCS | Mod: CPTII,S$GLB,, | Performed by: PHYSICIAN ASSISTANT

## 2021-11-01 PROCEDURE — 3008F BODY MASS INDEX DOCD: CPT | Mod: CPTII,S$GLB,, | Performed by: PHYSICIAN ASSISTANT

## 2021-11-01 RX ORDER — MELOXICAM 15 MG/1
15 TABLET ORAL DAILY
Qty: 30 TABLET | Refills: 1 | Status: SHIPPED | OUTPATIENT
Start: 2021-11-01 | End: 2022-05-30 | Stop reason: SDUPTHER

## 2021-11-03 ENCOUNTER — CLINICAL SUPPORT (OUTPATIENT)
Dept: REHABILITATION | Facility: HOSPITAL | Age: 31
End: 2021-11-03
Payer: COMMERCIAL

## 2021-11-03 DIAGNOSIS — M25.611 DECREASED RANGE OF MOTION OF RIGHT SHOULDER: ICD-10-CM

## 2021-11-03 DIAGNOSIS — M25.511 ACUTE PAIN OF RIGHT SHOULDER: ICD-10-CM

## 2021-11-29 ENCOUNTER — PATIENT OUTREACH (OUTPATIENT)
Dept: ADMINISTRATIVE | Facility: OTHER | Age: 31
End: 2021-11-29
Payer: COMMERCIAL

## 2022-01-02 ENCOUNTER — IMMUNIZATION (OUTPATIENT)
Dept: PRIMARY CARE CLINIC | Facility: CLINIC | Age: 32
End: 2022-01-02
Payer: COMMERCIAL

## 2022-01-02 DIAGNOSIS — Z23 NEED FOR VACCINATION: Primary | ICD-10-CM

## 2022-01-02 PROCEDURE — 0064A COVID-19, MRNA, LNP-S, PF, 100 MCG/0.25 ML DOSE VACCINE (MODERNA BOOSTER): CPT | Mod: CV19,PBBFAC | Performed by: INTERNAL MEDICINE

## 2022-01-13 ENCOUNTER — TELEPHONE (OUTPATIENT)
Dept: OBSTETRICS AND GYNECOLOGY | Facility: CLINIC | Age: 32
End: 2022-01-13
Payer: COMMERCIAL

## 2022-01-17 ENCOUNTER — PATIENT MESSAGE (OUTPATIENT)
Dept: OBSTETRICS AND GYNECOLOGY | Facility: CLINIC | Age: 32
End: 2022-01-17
Payer: COMMERCIAL

## 2022-02-22 ENCOUNTER — TELEPHONE (OUTPATIENT)
Dept: OBSTETRICS AND GYNECOLOGY | Facility: CLINIC | Age: 32
End: 2022-02-22
Payer: MEDICAID

## 2022-02-22 NOTE — TELEPHONE ENCOUNTER
I left a message for the pt to call the office back to reschedule her appt that she had on feb 24 th. The pt was informed that the soonest we have right now is in May.

## 2022-02-23 ENCOUNTER — TELEPHONE (OUTPATIENT)
Dept: OBSTETRICS AND GYNECOLOGY | Facility: CLINIC | Age: 32
End: 2022-02-23
Payer: MEDICAID

## 2022-02-23 NOTE — TELEPHONE ENCOUNTER
----- Message from Laura Gómez sent at 2/22/2022  3:35 PM CST -----  Regarding: Reschedule  Name of Who is Calling: BILL VICTORIA [[id number]]           What is the request in detail: Patient is requesting a call back to reschedule her annual.  Please assist.           Can the clinic reply by [facility]: No           What Number to Call Back if not in [facility]: [phone]

## 2022-04-28 ENCOUNTER — PATIENT OUTREACH (OUTPATIENT)
Dept: ADMINISTRATIVE | Facility: OTHER | Age: 32
End: 2022-04-28
Payer: COMMERCIAL

## 2022-05-10 ENCOUNTER — HOSPITAL ENCOUNTER (OUTPATIENT)
Dept: RADIOLOGY | Facility: HOSPITAL | Age: 32
Discharge: HOME | End: 2022-05-10
Attending: PHYSICIAN ASSISTANT
Payer: COMMERCIAL

## 2022-05-10 ENCOUNTER — OFFICE VISIT (OUTPATIENT)
Dept: PODIATRY | Facility: CLINIC | Age: 32
End: 2022-05-10
Payer: COMMERCIAL

## 2022-05-10 ENCOUNTER — OFFICE VISIT (OUTPATIENT)
Dept: ORTHOPEDICS | Facility: CLINIC | Age: 32
End: 2022-05-10
Payer: COMMERCIAL

## 2022-05-10 VITALS
BODY MASS INDEX: 36.05 KG/M2 | SYSTOLIC BLOOD PRESSURE: 121 MMHG | HEART RATE: 80 BPM | WEIGHT: 230.19 LBS | DIASTOLIC BLOOD PRESSURE: 80 MMHG

## 2022-05-10 VITALS — WEIGHT: 230.19 LBS | HEIGHT: 67 IN | BODY MASS INDEX: 36.13 KG/M2

## 2022-05-10 DIAGNOSIS — S86.301A PERONEAL TENDON INJURY, RIGHT, INITIAL ENCOUNTER: Primary | ICD-10-CM

## 2022-05-10 DIAGNOSIS — G89.29 CHRONIC PAIN OF RIGHT ANKLE: ICD-10-CM

## 2022-05-10 DIAGNOSIS — M25.571 CHRONIC PAIN OF RIGHT ANKLE: ICD-10-CM

## 2022-05-10 PROCEDURE — 73610 X-RAY EXAM OF ANKLE: CPT | Mod: TC,RT

## 2022-05-10 PROCEDURE — 99213 PR OFFICE/OUTPT VISIT, EST, LEVL III, 20-29 MIN: ICD-10-PCS | Mod: S$GLB,,, | Performed by: PHYSICIAN ASSISTANT

## 2022-05-10 PROCEDURE — 3008F PR BODY MASS INDEX (BMI) DOCUMENTED: ICD-10-PCS | Mod: CPTII,S$GLB,, | Performed by: PHYSICIAN ASSISTANT

## 2022-05-10 PROCEDURE — 99999 PR PBB SHADOW E&M-EST. PATIENT-LVL III: CPT | Mod: PBBFAC,,, | Performed by: PODIATRIST

## 2022-05-10 PROCEDURE — 1160F RVW MEDS BY RX/DR IN RCRD: CPT | Mod: CPTII,S$GLB,, | Performed by: PHYSICIAN ASSISTANT

## 2022-05-10 PROCEDURE — 99999 PR PBB SHADOW E&M-EST. PATIENT-LVL III: CPT | Mod: PBBFAC,,, | Performed by: PHYSICIAN ASSISTANT

## 2022-05-10 PROCEDURE — 73610 XR ANKLE COMPLETE 3 VIEW RIGHT: ICD-10-PCS | Mod: 26,RT,, | Performed by: RADIOLOGY

## 2022-05-10 PROCEDURE — 73610 X-RAY EXAM OF ANKLE: CPT | Mod: 26,RT,, | Performed by: RADIOLOGY

## 2022-05-10 PROCEDURE — 3008F BODY MASS INDEX DOCD: CPT | Mod: CPTII,S$GLB,, | Performed by: PODIATRIST

## 2022-05-10 PROCEDURE — 99999 PR PBB SHADOW E&M-EST. PATIENT-LVL III: ICD-10-PCS | Mod: PBBFAC,,, | Performed by: PODIATRIST

## 2022-05-10 PROCEDURE — 3008F BODY MASS INDEX DOCD: CPT | Mod: CPTII,S$GLB,, | Performed by: PHYSICIAN ASSISTANT

## 2022-05-10 PROCEDURE — 1160F PR REVIEW ALL MEDS BY PRESCRIBER/CLIN PHARMACIST DOCUMENTED: ICD-10-PCS | Mod: CPTII,S$GLB,, | Performed by: PHYSICIAN ASSISTANT

## 2022-05-10 PROCEDURE — 3074F PR MOST RECENT SYSTOLIC BLOOD PRESSURE < 130 MM HG: ICD-10-PCS | Mod: CPTII,S$GLB,, | Performed by: PODIATRIST

## 2022-05-10 PROCEDURE — 99203 OFFICE O/P NEW LOW 30 MIN: CPT | Mod: S$GLB,,, | Performed by: PODIATRIST

## 2022-05-10 PROCEDURE — 99999 PR PBB SHADOW E&M-EST. PATIENT-LVL III: ICD-10-PCS | Mod: PBBFAC,,, | Performed by: PHYSICIAN ASSISTANT

## 2022-05-10 PROCEDURE — 99213 OFFICE O/P EST LOW 20 MIN: CPT | Mod: S$GLB,,, | Performed by: PHYSICIAN ASSISTANT

## 2022-05-10 PROCEDURE — 3079F PR MOST RECENT DIASTOLIC BLOOD PRESSURE 80-89 MM HG: ICD-10-PCS | Mod: CPTII,S$GLB,, | Performed by: PODIATRIST

## 2022-05-10 PROCEDURE — 99203 PR OFFICE/OUTPT VISIT, NEW, LEVL III, 30-44 MIN: ICD-10-PCS | Mod: S$GLB,,, | Performed by: PODIATRIST

## 2022-05-10 PROCEDURE — 1159F PR MEDICATION LIST DOCUMENTED IN MEDICAL RECORD: ICD-10-PCS | Mod: CPTII,S$GLB,, | Performed by: PHYSICIAN ASSISTANT

## 2022-05-10 PROCEDURE — 3079F DIAST BP 80-89 MM HG: CPT | Mod: CPTII,S$GLB,, | Performed by: PODIATRIST

## 2022-05-10 PROCEDURE — 1159F MED LIST DOCD IN RCRD: CPT | Mod: CPTII,S$GLB,, | Performed by: PHYSICIAN ASSISTANT

## 2022-05-10 PROCEDURE — 3074F SYST BP LT 130 MM HG: CPT | Mod: CPTII,S$GLB,, | Performed by: PODIATRIST

## 2022-05-10 PROCEDURE — 3008F PR BODY MASS INDEX (BMI) DOCUMENTED: ICD-10-PCS | Mod: CPTII,S$GLB,, | Performed by: PODIATRIST

## 2022-05-10 RX ORDER — METHYLPREDNISOLONE 4 MG/1
TABLET ORAL
Qty: 1 TABLET | Refills: 0 | Status: SHIPPED | OUTPATIENT
Start: 2022-05-10 | End: 2022-09-26

## 2022-05-10 NOTE — PROGRESS NOTES
Subjective:      Patient ID: Dora Hernandez is a 32 y.o. female.    Chief Complaint: Pain of the Right Ankle    HPI  32 year old female presents with chief complaint of right ankle pain x years. It has been getting worse the past 7-8 months. Pain is lateral. She denies trauma but she use to run track in high school. Pain is worse in the mornings and with standing. She limps in the mornings. She has tried diclofenac with no relief. She reports swelling, cracking, and giving way. She reports intermittent tingling. She wore a boot in the past with no relief. She had one CSI years ago that gave her relief for a couple of months. The injection was done laterally. She has seen several providers in the past for her ankle. No previous ankle surgery.   Review of Systems   Constitutional: Negative for chills, fever and night sweats.   Cardiovascular: Negative for chest pain.   Respiratory: Negative for cough and shortness of breath.    Hematologic/Lymphatic: Does not bruise/bleed easily.   Skin: Negative for color change.   Gastrointestinal: Negative for heartburn.   Genitourinary: Negative for dysuria.   Psychiatric/Behavioral: Negative for altered mental status.   Allergic/Immunologic: Negative for persistent infections.         Objective:            General    Vitals reviewed.  Constitutional: She is oriented to person, place, and time. She appears well-developed and well-nourished.   Cardiovascular: Normal rate.    Neurological: She is alert and oriented to person, place, and time.         Right Ankle/Foot Exam     Inspection   Erythema: absent    Tenderness   The patient is tender to palpation of the peroneals.    Range of Motion   Ankle Joint   Dorsiflexion: abnormal   Plantar flexion: abnormal   Subtalar Joint   Inversion: abnormal   Eversion: abnormal   Morales Test:  negative    Other   Sensation: normal    Comments:  Limited ROM due to pain. She has more pain with subtalar ROM. Mild swelling laterally.  Also TTP in the subtalar area laterally.           X-ray was ordered and independently reviewed by me. Can see some OA change at the subtalar joint.        Assessment:       Encounter Diagnosis   Name Primary?    Chronic pain of right ankle           Plan:       Due to chronic pain for several years that is getting worse, MRI ankle was ordered. Medrol dose pack sent to pharmacy. She will f/u with Dr. Akhtar after MRI is done to discuss tx options.

## 2022-05-20 NOTE — PROGRESS NOTES
Subjective:      Patient ID: Dora Hernandez is a 32 y.o. female.    Chief Complaint:   Ankle Pain (Right ankle on right side  patient had xray ) and Nail Problem (Nail discoloration )    Dora is a 32 y.o. female who presents to the podiatry clinic  with complaint of  right foot pain. Onset of the symptoms was several weeks ago. Precipitating event: increased activity. Current symptoms include: ability to bear weight, but with some pain. Aggravating factors: any weight bearing. Symptoms have gradually worsened. Patient has had no prior foot problems. Evaluation to date: none. Treatment to date: avoidance of offending activity. Patients rates pain 7/10 on pain scale.        Review of Systems   Constitutional: Negative for chills, decreased appetite, fever and malaise/fatigue.   HENT: Negative for congestion, hearing loss, nosebleeds and tinnitus.    Eyes: Negative for double vision, pain, photophobia and visual disturbance.   Cardiovascular: Negative for chest pain, claudication, cyanosis and leg swelling.   Respiratory: Negative for cough, hemoptysis, shortness of breath and wheezing.    Endocrine: Negative for cold intolerance and heat intolerance.   Hematologic/Lymphatic: Negative for adenopathy and bleeding problem.   Skin: Negative for color change, dry skin, itching, nail changes and suspicious lesions.   Musculoskeletal: Positive for myalgias and stiffness. Negative for arthritis and joint pain.   Gastrointestinal: Negative for abdominal pain, jaundice, nausea and vomiting.   Genitourinary: Negative for dysuria, frequency and hematuria.   Neurological: Negative for difficulty with concentration, loss of balance, numbness, paresthesias and sensory change.   Psychiatric/Behavioral: Negative for altered mental status, hallucinations and suicidal ideas. The patient is not nervous/anxious.    Allergic/Immunologic: Negative for environmental allergies and persistent infections.           Objective:       Physical Exam  Vitals reviewed.   Constitutional:       Appearance: She is well-developed.   HENT:      Head: Normocephalic and atraumatic.   Cardiovascular:      Pulses:           Dorsalis pedis pulses are 2+ on the right side and 2+ on the left side.        Posterior tibial pulses are 2+ on the right side and 2+ on the left side.   Pulmonary:      Effort: Pulmonary effort is normal.   Musculoskeletal:         General: Normal range of motion.      Comments: Inspection and palpation of the muscles joints and bones of both lower extremities reveal that muscle strength for the anterior lateral and posterior muscle groups and intrinsic muscle groups of the foot are all 5 over 5 symmetrical.    Tenderness noted to the right peroneal tendon complex increased upon resistance with eversion.     Skin:     General: Skin is warm and dry.      Capillary Refill: Capillary refill takes 2 to 3 seconds.      Comments: Skin turgor is normal bilaterally.  Skin texture is well hydrated to both lower extremities.  No lesions or rashes or wounds appreciated bilaterally.  Nail plates 1 through 5 bilaterally are within normal limits for length and thickness.  No nail clubbing or incurvation noted.   Neurological:      Mental Status: She is alert and oriented to person, place, and time.      Comments: Sharp dull light touch vibratory proprioceptive sensation are intact bilaterally.  Deep tendon reflexes to patellar and Achilles tendon are symmetrical 2 over 4 bilaterally.  No ankle clonus or Babinski reflexes noted bilaterally.  Coordination is normal to both feet and lower extremities.   Psychiatric:         Behavior: Behavior normal.               Assessment:       Encounter Diagnosis   Name Primary?    Peroneal tendon injury, right, initial encounter Yes     Independent visualization of imaging was performed.  Results were reviewed in detail with patient.       Plan:       Dora was seen today for ankle pain and nail  problem.    Diagnoses and all orders for this visit:    Peroneal tendon injury, right, initial encounter      I counseled the patient on her conditions, their implications and medical management.    The nature of the condition, options for management, as well as potential risks and complications were discussed in detail with patient. Patient was amenable to my recommendations and left my office fully informed and will follow up as instructed or sooner if necessary.      Begin ankle brace and/or walking boot,, icing, topical anti-inflammatory medication and a night splint.  Follow-up in 4-6 weeks or sooner if new problems arise or condition worsens.

## 2022-05-21 ENCOUNTER — HOSPITAL ENCOUNTER (OUTPATIENT)
Dept: RADIOLOGY | Facility: OTHER | Age: 32
Discharge: HOME | End: 2022-05-21
Attending: PHYSICIAN ASSISTANT
Payer: COMMERCIAL

## 2022-05-21 DIAGNOSIS — G89.29 CHRONIC PAIN OF RIGHT ANKLE: ICD-10-CM

## 2022-05-21 DIAGNOSIS — M25.571 CHRONIC PAIN OF RIGHT ANKLE: ICD-10-CM

## 2022-05-21 PROCEDURE — 73721 MRI JNT OF LWR EXTRE W/O DYE: CPT | Mod: 26,RT,, | Performed by: RADIOLOGY

## 2022-05-21 PROCEDURE — 73721 MRI ANKLE WITHOUT CONTRAST RIGHT: ICD-10-PCS | Mod: 26,RT,, | Performed by: RADIOLOGY

## 2022-05-21 PROCEDURE — 73721 MRI JNT OF LWR EXTRE W/O DYE: CPT | Mod: TC,RT

## 2022-05-30 ENCOUNTER — OFFICE VISIT (OUTPATIENT)
Dept: PODIATRY | Facility: CLINIC | Age: 32
End: 2022-05-30
Payer: COMMERCIAL

## 2022-05-30 ENCOUNTER — OFFICE VISIT (OUTPATIENT)
Dept: ORTHOPEDICS | Facility: CLINIC | Age: 32
End: 2022-05-30
Payer: COMMERCIAL

## 2022-05-30 VITALS
WEIGHT: 230.19 LBS | HEIGHT: 67 IN | DIASTOLIC BLOOD PRESSURE: 76 MMHG | BODY MASS INDEX: 36.13 KG/M2 | SYSTOLIC BLOOD PRESSURE: 115 MMHG | HEIGHT: 67 IN | WEIGHT: 230.19 LBS | HEART RATE: 70 BPM | BODY MASS INDEX: 36.13 KG/M2

## 2022-05-30 DIAGNOSIS — M25.571 PAIN IN JOINT INVOLVING RIGHT ANKLE AND FOOT: ICD-10-CM

## 2022-05-30 DIAGNOSIS — Q66.89 TARSAL COALITION OF RIGHT FOOT: Primary | ICD-10-CM

## 2022-05-30 DIAGNOSIS — M19.071 ARTHRITIS OF RIGHT SUBTALAR JOINT: ICD-10-CM

## 2022-05-30 DIAGNOSIS — M79.671 FOOT PAIN, RIGHT: ICD-10-CM

## 2022-05-30 DIAGNOSIS — Q66.89 TARSAL COALITION: Primary | ICD-10-CM

## 2022-05-30 PROCEDURE — 99999 PR PBB SHADOW E&M-EST. PATIENT-LVL III: ICD-10-PCS | Mod: PBBFAC,,, | Performed by: ORTHOPAEDIC SURGERY

## 2022-05-30 PROCEDURE — 3008F BODY MASS INDEX DOCD: CPT | Mod: CPTII,S$GLB,, | Performed by: PODIATRIST

## 2022-05-30 PROCEDURE — 1159F MED LIST DOCD IN RCRD: CPT | Mod: CPTII,S$GLB,, | Performed by: ORTHOPAEDIC SURGERY

## 2022-05-30 PROCEDURE — 3008F PR BODY MASS INDEX (BMI) DOCUMENTED: ICD-10-PCS | Mod: CPTII,S$GLB,, | Performed by: ORTHOPAEDIC SURGERY

## 2022-05-30 PROCEDURE — 1159F PR MEDICATION LIST DOCUMENTED IN MEDICAL RECORD: ICD-10-PCS | Mod: CPTII,S$GLB,, | Performed by: ORTHOPAEDIC SURGERY

## 2022-05-30 PROCEDURE — 99214 OFFICE O/P EST MOD 30 MIN: CPT | Mod: S$GLB,,, | Performed by: PODIATRIST

## 2022-05-30 PROCEDURE — 99999 PR PBB SHADOW E&M-EST. PATIENT-LVL III: CPT | Mod: PBBFAC,,, | Performed by: ORTHOPAEDIC SURGERY

## 2022-05-30 PROCEDURE — 99214 OFFICE O/P EST MOD 30 MIN: CPT | Mod: S$GLB,,, | Performed by: ORTHOPAEDIC SURGERY

## 2022-05-30 PROCEDURE — 3078F DIAST BP <80 MM HG: CPT | Mod: CPTII,S$GLB,, | Performed by: PODIATRIST

## 2022-05-30 PROCEDURE — 99214 PR OFFICE/OUTPT VISIT, EST, LEVL IV, 30-39 MIN: ICD-10-PCS | Mod: S$GLB,,, | Performed by: ORTHOPAEDIC SURGERY

## 2022-05-30 PROCEDURE — 3074F PR MOST RECENT SYSTOLIC BLOOD PRESSURE < 130 MM HG: ICD-10-PCS | Mod: CPTII,S$GLB,, | Performed by: PODIATRIST

## 2022-05-30 PROCEDURE — 3074F SYST BP LT 130 MM HG: CPT | Mod: CPTII,S$GLB,, | Performed by: PODIATRIST

## 2022-05-30 PROCEDURE — 3008F BODY MASS INDEX DOCD: CPT | Mod: CPTII,S$GLB,, | Performed by: ORTHOPAEDIC SURGERY

## 2022-05-30 PROCEDURE — 3078F PR MOST RECENT DIASTOLIC BLOOD PRESSURE < 80 MM HG: ICD-10-PCS | Mod: CPTII,S$GLB,, | Performed by: PODIATRIST

## 2022-05-30 PROCEDURE — 99999 PR PBB SHADOW E&M-EST. PATIENT-LVL III: CPT | Mod: PBBFAC,,, | Performed by: PODIATRIST

## 2022-05-30 PROCEDURE — 3008F PR BODY MASS INDEX (BMI) DOCUMENTED: ICD-10-PCS | Mod: CPTII,S$GLB,, | Performed by: PODIATRIST

## 2022-05-30 PROCEDURE — 99999 PR PBB SHADOW E&M-EST. PATIENT-LVL III: ICD-10-PCS | Mod: PBBFAC,,, | Performed by: PODIATRIST

## 2022-05-30 PROCEDURE — 99214 PR OFFICE/OUTPT VISIT, EST, LEVL IV, 30-39 MIN: ICD-10-PCS | Mod: S$GLB,,, | Performed by: PODIATRIST

## 2022-05-30 RX ORDER — MELOXICAM 15 MG/1
15 TABLET ORAL DAILY
Qty: 30 TABLET | Refills: 1 | Status: SHIPPED | OUTPATIENT
Start: 2022-05-30 | End: 2022-09-26

## 2022-05-30 RX ORDER — KETOCONAZOLE 20 MG/G
CREAM TOPICAL DAILY
Qty: 60 G | Refills: 2 | Status: SHIPPED | OUTPATIENT
Start: 2022-05-30 | End: 2022-10-18 | Stop reason: SDUPTHER

## 2022-05-30 NOTE — PROGRESS NOTES
Subjective:      Patient ID: Dora Hernandez is a 32 y.o. female.    Chief Complaint:  Right ankle and hindfoot pain    HPI:  This is a 32-year-old female with a chronic history of right ankle pain that has been getting worse over the last 7 or 8 months.  She reports lateral-sided pain.  She denies any recent trauma but states she used to run track in high school.  She states the pain is worse in the morning when she gets up from a resting position.  She has tried nonsteroidal anti-inflammatories with no relief.  She reports she had a corticosteroid injection several years ago that gave her some relief.  She was seen by a PA in the orthopedic clinic on 05/10/2022.  X-rays were performed which suggested some arthritis of the subtalar joint.  An MRI was performed on 05/21/2022 which reveals changes consistent with a talocalcaneal coalition as well as degenerative changes of the subtalar joint.    Past Medical History:   Diagnosis Date    Abnormal Pap smear     Anxiety     Depression     Migraine headache     Polycystic disease, ovaries        Current Outpatient Medications:     diclofenac (VOLTAREN) 50 MG EC tablet, Take 1 tablet (50 mg total) by mouth 2 (two) times daily as needed (shoulder pain and ankle pain)., Disp: 30 tablet, Rfl: 0    ergocalciferol (ERGOCALCIFEROL) 50,000 unit Cap, Take 1 capsule (50,000 Units total) by mouth every 7 days., Disp: 12 capsule, Rfl: 0    FLUoxetine 10 MG capsule, Take 1 capsule (10 mg total) by mouth once daily for 7 days, THEN 2 capsules (20 mg total) once daily for 23 days., Disp: 53 capsule, Rfl: 0    meloxicam (MOBIC) 15 MG tablet, Take 1 tablet (15 mg total) by mouth once daily., Disp: 30 tablet, Rfl: 1    methylPREDNISolone (MEDROL DOSEPACK) 4 mg tablet, use as directed (Patient not taking: Reported on 5/10/2022), Disp: 1 tablet, Rfl: 0  Review of patient's allergies indicates:   Allergen Reactions    Metformin Nausea And Vomiting       There were no  vitals taken for this visit.    ROS:  Negative for chest pain, shortness of breath, fevers, or unexplained weight loss.      Objective:    Ortho Exam      This is a pleasant well-developed well-nourished female who walks in with a slight antalgic gait.  On standing inspection she has plantigrade alignment of her feet.  There is no obvious asymmetry between her  feet and ankles.  On sitting exam she has functional motion of the right ankle as well as the subtalar joint but her subtalar joint motion is decreased on the right compared to the left and she does report some mild pain with subtalar motion.  She does have some tenderness in the sinus tarsi area of her right hindfoot.  She has normal function and strength of all the tendons about her ankle and hindfoot.  She is neurovascularly intact      Assessment:     Imaging:  I reviewed the plain x-rays of the right ankle as well as the MRI which confirms arthritis of the subtalar joint and fiber talocalcaneal coalition        1. Tarsal coalition of right foot, talocalcaneal joint    2. 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  Treatment options were discussed and conservative and operative measures.  Surgically she would require a subtalar fusion.  I discussed the possibility of doing another corticosteroid injection since she has had some relief in the past.  I also discussed the possibility of obtaining custom UCBL type orthotics to help limit subtalar motion.  I am going to order a CT scan to better evaluate the subtalar joint.  She is going to think about her treatment options.

## 2022-06-04 NOTE — PROGRESS NOTES
Subjective:      Patient ID: Dora Hernandez is a 32 y.o. female.    Chief Complaint:   Peroneal Tendon (Right. F/u) and Follow-up    Dora is a 32 y.o. female who presents to the podiatry clinic  with complaint of  right foot pain. Onset of the symptoms was several weeks ago. Precipitating event: increased activity. Current symptoms include: ability to bear weight, but with some pain. Aggravating factors: any weight bearing. Symptoms have gradually worsened. Patient has had no prior foot problems. Evaluation to date: none. Treatment to date: avoidance of offending activity. Patients rates pain 7/10 on pain scale.  Unchanged.  Here for MRI review.        Review of Systems   Constitutional: Negative for chills, decreased appetite, fever and malaise/fatigue.   HENT: Negative for congestion, hearing loss, nosebleeds and tinnitus.    Eyes: Negative for double vision, pain, photophobia and visual disturbance.   Cardiovascular: Negative for chest pain, claudication, cyanosis and leg swelling.   Respiratory: Negative for cough, hemoptysis, shortness of breath and wheezing.    Endocrine: Negative for cold intolerance and heat intolerance.   Hematologic/Lymphatic: Negative for adenopathy and bleeding problem.   Skin: Negative for color change, dry skin, itching, nail changes and suspicious lesions.   Musculoskeletal: Positive for myalgias and stiffness. Negative for arthritis and joint pain.   Gastrointestinal: Negative for abdominal pain, jaundice, nausea and vomiting.   Genitourinary: Negative for dysuria, frequency and hematuria.   Neurological: Negative for difficulty with concentration, loss of balance, numbness, paresthesias and sensory change.   Psychiatric/Behavioral: Negative for altered mental status, hallucinations and suicidal ideas. The patient is not nervous/anxious.    Allergic/Immunologic: Negative for environmental allergies and persistent infections.           Objective:      Physical  Exam  Vitals reviewed.   Constitutional:       Appearance: She is well-developed.   HENT:      Head: Normocephalic and atraumatic.   Cardiovascular:      Pulses:           Dorsalis pedis pulses are 2+ on the right side and 2+ on the left side.        Posterior tibial pulses are 2+ on the right side and 2+ on the left side.   Pulmonary:      Effort: Pulmonary effort is normal.   Musculoskeletal:         General: Normal range of motion.      Comments: Inspection and palpation of the muscles joints and bones of both lower extremities reveal that muscle strength for the anterior lateral and posterior muscle groups and intrinsic muscle groups of the foot are all 5 over 5 symmetrical.    Tenderness noted to the right peroneal tendon complex increased upon resistance with eversion.     Skin:     General: Skin is warm and dry.      Capillary Refill: Capillary refill takes 2 to 3 seconds.      Comments: Skin turgor is normal bilaterally.  Skin texture is well hydrated to both lower extremities.  No lesions or rashes or wounds appreciated bilaterally.  Nail plates 1 through 5 bilaterally are within normal limits for length and thickness.  No nail clubbing or incurvation noted.   Neurological:      Mental Status: She is alert and oriented to person, place, and time.      Comments: Sharp dull light touch vibratory proprioceptive sensation are intact bilaterally.  Deep tendon reflexes to patellar and Achilles tendon are symmetrical 2 over 4 bilaterally.  No ankle clonus or Babinski reflexes noted bilaterally.  Coordination is normal to both feet and lower extremities.   Psychiatric:         Behavior: Behavior normal.               Assessment:       Encounter Diagnoses   Name Primary?    Tarsal coalition - Right Foot Yes    Foot pain, right      Independent visualization of imaging was performed.  Results were reviewed in detail with patient.       Plan:       Dora was seen today for peroneal tendon and  follow-up.    Diagnoses and all orders for this visit:    Tarsal coalition - Right Foot  -     Ambulatory referral/consult to Orthopedics; Future    Foot pain, right  -     Ambulatory referral/consult to Orthopedics; Future    Other orders  -     meloxicam (MOBIC) 15 MG tablet; Take 1 tablet (15 mg total) by mouth once daily.  -     ketoconazole (NIZORAL) 2 % cream; Apply topically once daily.      I counseled the patient on her conditions, their implications and medical management.    The nature of the condition, options for management, as well as potential risks and complications were discussed in detail with patient. Patient was amenable to my recommendations and left my office fully informed and will follow up as instructed or sooner if necessary.      Independent visualization of imaging was performed.  Results were reviewed in detail with patient.    Follow-up with [facility].

## 2022-07-06 ENCOUNTER — PATIENT MESSAGE (OUTPATIENT)
Dept: OBSTETRICS AND GYNECOLOGY | Facility: CLINIC | Age: 32
End: 2022-07-06
Payer: COMMERCIAL

## 2022-07-14 ENCOUNTER — PATIENT MESSAGE (OUTPATIENT)
Dept: ADMINISTRATIVE | Facility: HOSPITAL | Age: 32
End: 2022-07-14
Payer: COMMERCIAL

## 2022-08-24 ENCOUNTER — PATIENT MESSAGE (OUTPATIENT)
Dept: ADMINISTRATIVE | Facility: HOSPITAL | Age: 32
End: 2022-08-24
Payer: COMMERCIAL

## 2022-09-22 ENCOUNTER — OFFICE VISIT (OUTPATIENT)
Dept: URGENT CARE | Facility: CLINIC | Age: 32
End: 2022-09-22
Payer: COMMERCIAL

## 2022-09-22 VITALS
WEIGHT: 247.94 LBS | RESPIRATION RATE: 16 BRPM | TEMPERATURE: 99 F | OXYGEN SATURATION: 98 % | HEART RATE: 91 BPM | HEIGHT: 67 IN | SYSTOLIC BLOOD PRESSURE: 118 MMHG | DIASTOLIC BLOOD PRESSURE: 83 MMHG | BODY MASS INDEX: 38.91 KG/M2

## 2022-09-22 DIAGNOSIS — J02.9 SORE THROAT: ICD-10-CM

## 2022-09-22 DIAGNOSIS — J10.1 INFLUENZA A: Primary | ICD-10-CM

## 2022-09-22 LAB
CTP QC/QA: YES
CTP QC/QA: YES
MOLECULAR STREP A: NEGATIVE
POC MOLECULAR INFLUENZA A AGN: POSITIVE
POC MOLECULAR INFLUENZA B AGN: NEGATIVE

## 2022-09-22 PROCEDURE — 3074F PR MOST RECENT SYSTOLIC BLOOD PRESSURE < 130 MM HG: ICD-10-PCS | Mod: CPTII,S$GLB,, | Performed by: NURSE PRACTITIONER

## 2022-09-22 PROCEDURE — 87651 POCT STREP A MOLECULAR: ICD-10-PCS | Mod: QW,S$GLB,, | Performed by: NURSE PRACTITIONER

## 2022-09-22 PROCEDURE — 1159F PR MEDICATION LIST DOCUMENTED IN MEDICAL RECORD: ICD-10-PCS | Mod: CPTII,S$GLB,, | Performed by: NURSE PRACTITIONER

## 2022-09-22 PROCEDURE — 3079F DIAST BP 80-89 MM HG: CPT | Mod: CPTII,S$GLB,, | Performed by: NURSE PRACTITIONER

## 2022-09-22 PROCEDURE — 3008F PR BODY MASS INDEX (BMI) DOCUMENTED: ICD-10-PCS | Mod: CPTII,S$GLB,, | Performed by: NURSE PRACTITIONER

## 2022-09-22 PROCEDURE — 1160F PR REVIEW ALL MEDS BY PRESCRIBER/CLIN PHARMACIST DOCUMENTED: ICD-10-PCS | Mod: CPTII,S$GLB,, | Performed by: NURSE PRACTITIONER

## 2022-09-22 PROCEDURE — 3008F BODY MASS INDEX DOCD: CPT | Mod: CPTII,S$GLB,, | Performed by: NURSE PRACTITIONER

## 2022-09-22 PROCEDURE — 87502 INFLUENZA DNA AMP PROBE: CPT | Mod: QW,S$GLB,, | Performed by: NURSE PRACTITIONER

## 2022-09-22 PROCEDURE — 99203 OFFICE O/P NEW LOW 30 MIN: CPT | Mod: S$GLB,,, | Performed by: NURSE PRACTITIONER

## 2022-09-22 PROCEDURE — 87651 STREP A DNA AMP PROBE: CPT | Mod: QW,S$GLB,, | Performed by: NURSE PRACTITIONER

## 2022-09-22 PROCEDURE — 3074F SYST BP LT 130 MM HG: CPT | Mod: CPTII,S$GLB,, | Performed by: NURSE PRACTITIONER

## 2022-09-22 PROCEDURE — 1160F RVW MEDS BY RX/DR IN RCRD: CPT | Mod: CPTII,S$GLB,, | Performed by: NURSE PRACTITIONER

## 2022-09-22 PROCEDURE — 1159F MED LIST DOCD IN RCRD: CPT | Mod: CPTII,S$GLB,, | Performed by: NURSE PRACTITIONER

## 2022-09-22 PROCEDURE — 99203 PR OFFICE/OUTPT VISIT, NEW, LEVL III, 30-44 MIN: ICD-10-PCS | Mod: S$GLB,,, | Performed by: NURSE PRACTITIONER

## 2022-09-22 PROCEDURE — 3079F PR MOST RECENT DIASTOLIC BLOOD PRESSURE 80-89 MM HG: ICD-10-PCS | Mod: CPTII,S$GLB,, | Performed by: NURSE PRACTITIONER

## 2022-09-22 PROCEDURE — 87502 POCT INFLUENZA A/B MOLECULAR: ICD-10-PCS | Mod: QW,S$GLB,, | Performed by: NURSE PRACTITIONER

## 2022-09-22 RX ORDER — OSELTAMIVIR PHOSPHATE 75 MG/1
75 CAPSULE ORAL 2 TIMES DAILY
Qty: 10 CAPSULE | Refills: 0 | Status: SHIPPED | OUTPATIENT
Start: 2022-09-22 | End: 2022-09-26

## 2022-09-22 NOTE — LETTER
September 22, 2022      [facility] [place]  [address]  [place] [address]  Phone: [phone]  Fax: [phone]       Patient: Dora Hernandez   YOB: 1990  Date of Visit: 09/22/2022    To Whom It May Concern:    Charmaine Hernandez  was at [facility] on 09/22/2022. The patient may return to work/school on 9/26/2022 with no restrictions, if afebrile without the use of medications. If you have any questions or concerns, or if I can be of further assistance, please do not hesitate to contact me.    Sincerely,        Bryan Rosario NP

## 2022-09-22 NOTE — PATIENT INSTRUCTIONS
"If your condition worsens or fails to improve, we recommend that you receive another evaluation at the ER immediately contact your PCP to discuss your concerns, or return here.  You must understand that you've received an urgent care treatment only, and that you may be released before all your medical problems are known or treated.  You, the patient, will arrange for follow-up care as instructed.     If we discussed that I think your illness is viral, it will not respond to antibiotics and will last 10-14 days.  If we discussed "wait and see" antibiotics, and if over the next few days the symptoms worsen, start the antibiotics I have given you.     If you are female and on birth control pills and do take the antibiotics, use additional methods to prevent pregnancy while on the antibiotics and for one cycle after.     Flonase (fluticasone) is a nasal spray which is available over the counter and may help with your symptoms.  Zyrtec D, Claritin D, or Allegra D can also help with symptoms of congestion and drainage.  If you have hypertension, avoid using the "D" which is the decongestant formula.    If you just have drainage, you can take plain Zyrtec, Claritin, or Allegra.  If you just have a congested feeling, you can take pseudoephedrine (unless you have high blood pressure), which you have to sign for behind the counter.  Do not buy phenylephrine OTC, as it is not effective.    Rest and fluids are also important.  Tylenol or ibuprofen can also be used as directed for pain, unless you have an allergy to them or medical condition (such as stomach ulcers, kidney or liver disease, or use blood thinners, etc.) for which you should not be taking these type of medications.     If you are flying in the next few days, Afrin nose drops for the airplane flight upon take off and landing may help.  Other than at those times, refrain from using Afrin.     If you were prescribed a narcotic or sedating cough medicine, do not drive " or operate heavy machinery while taking these medications.

## 2022-09-22 NOTE — PROGRESS NOTES
"Subjective:       Patient ID: Dora Hernandez is a 32 y.o. female.    Vitals:  height is 5' 7" (1.702 m) and weight is 112.5 kg (247 lb 14.5 oz). Her temperature is 99.3 °F (37.4 °C). Her blood pressure is 118/83 and her pulse is 91. Her respiration is 16 and oxygen saturation is 98%.     Chief Complaint: Sinus Problem    This is a 32 y.o. female who presents today with a chief complaint of fatigue, body aches, nasal congestion, post nasal drip, sore throat, cough, right ear ache, chills, and headaches x 2 days ago, reports that she has been having vague sinus pain and pressure with sore throat for approx 3 weeks, but symptoms have worsened dramatically in the past 2-3 days.  Reports that son tested positive for both flu and strep throat earlier this week.  Reports 1 incident of vomiting today while at work, denies current nausea, denies diarrhea.  Denies fever.  Denies chest pain or SOB.  Reports COVID vaccination.    Sinus Problem  This is a new problem. Episode onset: 2 days ago. The problem has been gradually worsening since onset. There has been no fever. Her pain is at a severity of 0/10. Associated symptoms include chills, congestion, coughing, headaches and a sore throat. Pertinent negatives include no neck pain, shortness of breath or swollen glands. Past treatments include nothing.     Constitution: Positive for chills. Negative for fever.   HENT:  Positive for congestion, postnasal drip and sore throat.    Neck: Negative for neck pain.   Respiratory:  Positive for cough. Negative for shortness of breath and wheezing.    Gastrointestinal:  Positive for vomiting. Negative for nausea and diarrhea.   Neurological:  Positive for headaches.     Objective:      Physical Exam   Constitutional: She is oriented to person, place, and time. She appears well-developed. She is cooperative.  Non-toxic appearance. She appears ill. No distress.   HENT:   Head: Normocephalic and atraumatic.   Ears:   Right Ear: " Hearing, external ear and ear canal normal. Tympanic membrane is retracted. Tympanic membrane is not erythematous and not bulging. A middle ear effusion (clear fluid) is present.   Left Ear: Hearing, external ear and ear canal normal. Tympanic membrane is retracted. Tympanic membrane is not erythematous and not bulging. A middle ear effusion (clear fluid) is present.   Nose: Mucosal edema (erythema and swelling to BL turbinates), purulent discharge (to BL nares) and sinus tenderness present. No rhinorrhea or nasal deformity. No epistaxis. Right sinus exhibits maxillary sinus tenderness. Right sinus exhibits no frontal sinus tenderness. Left sinus exhibits maxillary sinus tenderness. Left sinus exhibits no frontal sinus tenderness.   Mouth/Throat: Uvula is midline and mucous membranes are normal. No trismus in the jaw. Normal dentition. No uvula swelling. Oropharyngeal exudate (cloudy postnasal drip) present. No posterior oropharyngeal edema or posterior oropharyngeal erythema. Tonsils are 0 on the right. Tonsils are 0 on the left. No tonsillar exudate (surgically removed).   Eyes: Conjunctivae and lids are normal. No scleral icterus.   Neck: Trachea normal and phonation normal. Neck supple. No edema present. No erythema present. No neck rigidity present.   Cardiovascular: Normal rate, regular rhythm, normal heart sounds and normal pulses.   Pulmonary/Chest: Effort normal and breath sounds normal. No accessory muscle usage or stridor. No tachypnea. No respiratory distress. She has no decreased breath sounds. She has no wheezes. She has no rhonchi. She has no rales.   Abdominal: Normal appearance.   Musculoskeletal: Normal range of motion.         General: No deformity. Normal range of motion.   Lymphadenopathy:        Head (right side): No submandibular adenopathy present.        Head (left side): No submandibular adenopathy present.     She has no cervical adenopathy.   Neurological: She is alert and oriented to  "person, place, and time. She exhibits normal muscle tone. Coordination normal.   Skin: Skin is warm, dry, intact, not diaphoretic and not pale.   Psychiatric: Her speech is normal and behavior is normal. Judgment and thought content normal.   Nursing note and vitals reviewed.    Results for orders placed or performed in visit on 09/22/22   POCT Influenza A/B MOLECULAR   Result Value Ref Range    POC Molecular Influenza A Ag Positive (A) Negative, Not Reported    POC Molecular Influenza B Ag Negative Negative, Not Reported     Acceptable Yes    POCT Strep A, Molecular   Result Value Ref Range    Molecular Strep A, POC Negative Negative     Acceptable Yes            Assessment:       1. Influenza A    2. Sore throat          Plan:       Provided education on prescribed medications, recommended adding OTC Claritin/Zyrtec and Flonase nasal spray, along with cool compresses to help with sinus pressure and nasal congestion.  Provided education on return/ER precautions.  Pt verbalized understanding and agreed to plan.      Influenza A  -     oseltamivir (TAMIFLU) 75 MG capsule; Take 1 capsule (75 mg total) by mouth 2 (two) times daily. for 5 days  Dispense: 10 capsule; Refill: 0    Sore throat  -     POCT Influenza A/B MOLECULAR  -     POCT Strep A, Molecular       Patient Instructions   If your condition worsens or fails to improve, we recommend that you receive another evaluation at the ER immediately contact your PCP to discuss your concerns, or return here.  You must understand that you've received an urgent care treatment only, and that you may be released before all your medical problems are known or treated.  You, the patient, will arrange for follow-up care as instructed.     If we discussed that I think your illness is viral, it will not respond to antibiotics and will last 10-14 days.  If we discussed "wait and see" antibiotics, and if over the next few days the symptoms worsen, " "start the antibiotics I have given you.     If you are female and on birth control pills and do take the antibiotics, use additional methods to prevent pregnancy while on the antibiotics and for one cycle after.     Flonase (fluticasone) is a nasal spray which is available over the counter and may help with your symptoms.  Zyrtec D, Claritin D, or Allegra D can also help with symptoms of congestion and drainage.  If you have hypertension, avoid using the "D" which is the decongestant formula.    If you just have drainage, you can take plain Zyrtec, Claritin, or Allegra.  If you just have a congested feeling, you can take pseudoephedrine (unless you have high blood pressure), which you have to sign for behind the counter.  Do not buy phenylephrine OTC, as it is not effective.    Rest and fluids are also important.  Tylenol or ibuprofen can also be used as directed for pain, unless you have an allergy to them or medical condition (such as stomach ulcers, kidney or liver disease, or use blood thinners, etc.) for which you should not be taking these type of medications.     If you are flying in the next few days, Afrin nose drops for the airplane flight upon take off and landing may help.  Other than at those times, refrain from using Afrin.     If you were prescribed a narcotic or sedating cough medicine, do not drive or operate heavy machinery while taking these medications.                 "

## 2022-09-26 ENCOUNTER — OFFICE VISIT (OUTPATIENT)
Dept: INTERNAL MEDICINE | Facility: CLINIC | Age: 32
End: 2022-09-26
Payer: COMMERCIAL

## 2022-09-26 VITALS
TEMPERATURE: 98 F | OXYGEN SATURATION: 98 % | SYSTOLIC BLOOD PRESSURE: 109 MMHG | RESPIRATION RATE: 17 BRPM | BODY MASS INDEX: 38.85 KG/M2 | HEART RATE: 85 BPM | HEIGHT: 67 IN | WEIGHT: 247.56 LBS | DIASTOLIC BLOOD PRESSURE: 67 MMHG

## 2022-09-26 DIAGNOSIS — J10.1 INFLUENZA DUE TO INFLUENZA A VIRUS: ICD-10-CM

## 2022-09-26 DIAGNOSIS — Z00.00 ANNUAL PHYSICAL EXAM: Primary | ICD-10-CM

## 2022-09-26 DIAGNOSIS — R79.89 ABNORMAL CBC: ICD-10-CM

## 2022-09-26 DIAGNOSIS — J01.90 ACUTE BACTERIAL SINUSITIS: ICD-10-CM

## 2022-09-26 DIAGNOSIS — R73.03 PREDIABETES: ICD-10-CM

## 2022-09-26 DIAGNOSIS — E55.9 VITAMIN D INSUFFICIENCY: ICD-10-CM

## 2022-09-26 DIAGNOSIS — B96.89 ACUTE BACTERIAL SINUSITIS: ICD-10-CM

## 2022-09-26 DIAGNOSIS — F32.81 PREMENSTRUAL DYSPHORIC DISORDER: ICD-10-CM

## 2022-09-26 PROCEDURE — 1159F PR MEDICATION LIST DOCUMENTED IN MEDICAL RECORD: ICD-10-PCS | Mod: CPTII,S$GLB,, | Performed by: INTERNAL MEDICINE

## 2022-09-26 PROCEDURE — 1160F PR REVIEW ALL MEDS BY PRESCRIBER/CLIN PHARMACIST DOCUMENTED: ICD-10-PCS | Mod: CPTII,S$GLB,, | Performed by: INTERNAL MEDICINE

## 2022-09-26 PROCEDURE — 99999 PR PBB SHADOW E&M-EST. PATIENT-LVL IV: CPT | Mod: PBBFAC,,, | Performed by: INTERNAL MEDICINE

## 2022-09-26 PROCEDURE — 99395 PREV VISIT EST AGE 18-39: CPT | Mod: S$GLB,,, | Performed by: INTERNAL MEDICINE

## 2022-09-26 PROCEDURE — 3074F PR MOST RECENT SYSTOLIC BLOOD PRESSURE < 130 MM HG: ICD-10-PCS | Mod: CPTII,S$GLB,, | Performed by: INTERNAL MEDICINE

## 2022-09-26 PROCEDURE — 3078F PR MOST RECENT DIASTOLIC BLOOD PRESSURE < 80 MM HG: ICD-10-PCS | Mod: CPTII,S$GLB,, | Performed by: INTERNAL MEDICINE

## 2022-09-26 PROCEDURE — 99395 PR PREVENTIVE VISIT,EST,18-39: ICD-10-PCS | Mod: S$GLB,,, | Performed by: INTERNAL MEDICINE

## 2022-09-26 PROCEDURE — 99999 PR PBB SHADOW E&M-EST. PATIENT-LVL IV: ICD-10-PCS | Mod: PBBFAC,,, | Performed by: INTERNAL MEDICINE

## 2022-09-26 PROCEDURE — 3074F SYST BP LT 130 MM HG: CPT | Mod: CPTII,S$GLB,, | Performed by: INTERNAL MEDICINE

## 2022-09-26 PROCEDURE — 1159F MED LIST DOCD IN RCRD: CPT | Mod: CPTII,S$GLB,, | Performed by: INTERNAL MEDICINE

## 2022-09-26 PROCEDURE — 3008F BODY MASS INDEX DOCD: CPT | Mod: CPTII,S$GLB,, | Performed by: INTERNAL MEDICINE

## 2022-09-26 PROCEDURE — 3008F PR BODY MASS INDEX (BMI) DOCUMENTED: ICD-10-PCS | Mod: CPTII,S$GLB,, | Performed by: INTERNAL MEDICINE

## 2022-09-26 PROCEDURE — 1160F RVW MEDS BY RX/DR IN RCRD: CPT | Mod: CPTII,S$GLB,, | Performed by: INTERNAL MEDICINE

## 2022-09-26 PROCEDURE — 3078F DIAST BP <80 MM HG: CPT | Mod: CPTII,S$GLB,, | Performed by: INTERNAL MEDICINE

## 2022-09-26 RX ORDER — METHYLPREDNISOLONE 4 MG/1
TABLET ORAL
Qty: 1 EACH | Refills: 0 | Status: SHIPPED | OUTPATIENT
Start: 2022-09-26 | End: 2022-10-18

## 2022-09-26 RX ORDER — FLUTICASONE PROPIONATE 50 MCG
1 SPRAY, SUSPENSION (ML) NASAL DAILY
Qty: 16 ML | Refills: 0 | Status: SHIPPED | OUTPATIENT
Start: 2022-09-26 | End: 2023-01-03 | Stop reason: SDUPTHER

## 2022-09-26 RX ORDER — AMOXICILLIN AND CLAVULANATE POTASSIUM 875; 125 MG/1; MG/1
1 TABLET, FILM COATED ORAL EVERY 12 HOURS
Qty: 14 TABLET | Refills: 0 | Status: SHIPPED | OUTPATIENT
Start: 2022-09-26 | End: 2022-10-03

## 2022-09-26 RX ORDER — ERGOCALCIFEROL 1.25 MG/1
50000 CAPSULE ORAL
Qty: 12 CAPSULE | Refills: 0 | Status: SHIPPED | OUTPATIENT
Start: 2022-09-26 | End: 2023-01-09

## 2022-09-26 NOTE — PROGRESS NOTES
Subjective:     PCP: Janna Centeno MD    Dora Hernandez is a 32 y.o. female who presents for an annual exam.    Medical History:   Past Medical History:   Diagnosis Date    Abnormal Pap smear     Anxiety     Depression     Migraine headache     Polycystic disease, ovaries        Family History: family history includes Hypertension in her father; Lung cancer in her maternal grandmother; Stroke in her father; Thyroid disease in her mother.    Surgical History:   Past Surgical History:   Procedure Laterality Date    TYMPANOSTOMY          Social History:  reports that she has never smoked. She has never used smokeless tobacco. She reports current alcohol use. She reports that she does not use drugs.     Allergies:   Review of patient's allergies indicates:   Allergen Reactions    Metformin Nausea And Vomiting       Medications:   Current Outpatient Medications   Medication Sig    amoxicillin-clavulanate 875-125mg (AUGMENTIN) 875-125 mg per tablet Take 1 tablet by mouth every 12 (twelve) hours. for 7 days    ergocalciferol (ERGOCALCIFEROL) 50,000 unit Cap Take 1 capsule (50,000 Units total) by mouth every 7 days.    fluticasone propionate (FLONASE) 50 mcg/actuation nasal spray 1 spray (50 mcg total) by Each Nostril route once daily.    ketoconazole (NIZORAL) 2 % cream Apply topically once daily. (Patient not taking: No sig reported)    methylPREDNISolone (MEDROL DOSEPACK) 4 mg tablet use as directed     No current facility-administered medications for this visit.       Health Maintenance:   Health Maintenance Topics with due status: Not Due       Topic Last Completion Date    TETANUS VACCINE 01/28/2020     Lab Results   Component Value Date    HEPCAB Negative 03/01/2019    [id number] Negative 02/04/2020     Eye Exam:   due  Dental Exam: every 6 months  OB/GYN: Dr. Short.   Pap smear: due  HIV: 2/2020, neg  Hepatitic C  Ab: 3/2019, neg    Vaccinations:  Immunization History   Administered Date(s)  Administered    COVID-19 MRNA, LN-S PF (MODERNA HALF 0.25 ML DOSE) 01/02/2022    COVID-19, MRNA, LN-S, PF (MODERNA FULL 0.5 ML DOSE) 03/10/2021, 04/07/2021    MMR 10/08/1994, 02/26/2020    Meningococcal Conjugate (MCV4P) 01/23/2008    Tdap 01/28/2020     Influenza: due  Tetanus: 2020  Covid vaccine: boosted    Body mass index is 38.78 kg/m².  Wt Readings from Last 3 Encounters:   09/26/22 112.3 kg (247 lb 9.2 oz)   09/22/22 112.5 kg (247 lb 14.5 oz)   05/30/22 104.4 kg (230 lb 2.6 oz)       Review of Systems   Constitutional:  Positive for fatigue. Negative for chills, diaphoresis and fever.   HENT:  Positive for congestion (nasal), ear pain (left> right), postnasal drip, rhinorrhea, sinus pressure and sinus pain. Negative for dental problem, ear discharge, sore throat and trouble swallowing.         Sinus symptoms started 3 weeks ago before influenza infection, took tamiflu for fly but only could tolerate it for 2 days before stopping with nausea, flu symptoms have improved but the sinus symptoms continue   Eyes:  Negative for discharge, itching and visual disturbance.   Respiratory:  Positive for cough. Negative for chest tightness, shortness of breath and wheezing.    Cardiovascular:  Negative for chest pain, palpitations and leg swelling.   Gastrointestinal:  Negative for abdominal pain, blood in stool, constipation, diarrhea, nausea and vomiting.   Endocrine: Negative for polydipsia and polyuria.   Genitourinary:  Negative for decreased urine volume, dysuria, frequency and hematuria.   Musculoskeletal:  Negative for arthralgias, back pain and myalgias.   Skin:  Negative for rash and wound.   Neurological:  Positive for light-headedness and headaches (sinus headaches). Negative for weakness and numbness.   Hematological:  Negative for adenopathy.   Psychiatric/Behavioral:  Negative for dysphoric mood and sleep disturbance. The patient is not nervous/anxious.         Objective:     Physical Exam  Vitals reviewed.    Constitutional:       General: She is awake. She is not in acute distress.     Appearance: Normal appearance. She is well-developed and well-groomed. She is not diaphoretic.   HENT:      Head: Normocephalic and atraumatic.      Right Ear: Hearing, tympanic membrane, ear canal and external ear normal. Tympanic membrane is not erythematous or bulging.      Left Ear: Hearing, tympanic membrane, ear canal and external ear normal. Tympanic membrane is not erythematous or bulging.      Nose: Congestion and rhinorrhea present. Rhinorrhea is clear.      Right Sinus: Maxillary sinus tenderness present. No frontal sinus tenderness.      Left Sinus: Maxillary sinus tenderness present. No frontal sinus tenderness.      Mouth/Throat:      Mouth: Mucous membranes are moist.      Tongue: No lesions.      Pharynx: Oropharynx is clear. Uvula midline. No oropharyngeal exudate or posterior oropharyngeal erythema.   Eyes:      General: Lids are normal. Vision grossly intact. Gaze aligned appropriately. No scleral icterus.     Conjunctiva/sclera:      Right eye: Right conjunctiva is not injected.      Left eye: Left conjunctiva is not injected.      Pupils: Pupils are equal, round, and reactive to light.   Neck:      Thyroid: No thyroid mass or thyromegaly.   Cardiovascular:      Rate and Rhythm: Normal rate and regular rhythm.      Pulses: Normal pulses.      Heart sounds: Normal heart sounds. No murmur heard.  Pulmonary:      Effort: Pulmonary effort is normal. No respiratory distress.      Breath sounds: Normal breath sounds. No decreased breath sounds or wheezing.   Abdominal:      General: Bowel sounds are normal. There is no distension.      Palpations: Abdomen is soft. Abdomen is not rigid.      Tenderness: There is no abdominal tenderness. There is no guarding or rebound.   Musculoskeletal:         General: Normal range of motion.      Cervical back: Normal range of motion and neck supple.      Right lower leg: No edema.       Left lower leg: No edema.   Lymphadenopathy:      Cervical: No cervical adenopathy.      Upper Body:      Right upper body: No supraclavicular adenopathy.      Left upper body: No supraclavicular adenopathy.   Skin:     General: Skin is warm and dry.      Coloration: Skin is not cyanotic.      Findings: No lesion or rash.      Nails: There is no clubbing.   Neurological:      General: No focal deficit present.      Mental Status: She is alert and oriented to person, place, and time.      Sensory: Sensation is intact.      Coordination: Coordination is intact.      Gait: Gait is intact.      Deep Tendon Reflexes: Reflexes are normal and symmetric.   Psychiatric:         Attention and Perception: Attention normal.         Mood and Affect: Mood normal.         Behavior: Behavior is cooperative.            Assessment:        1. Annual physical exam    2. Acute bacterial sinusitis    3. Influenza due to influenza A virus    4. Prediabetes    5. Premenstrual dysphoric disorder    6. Vitamin D insufficiency    7. Abnormal CBC           Plan:     1. Annual physical exam  - CBC Auto Differential; Future  - Comprehensive Metabolic Panel; Future  - Lipid Panel; Future  - TSH; Future  - Urinalysis; Future    2. Acute bacterial sinusitis  - amoxicillin-clavulanate 875-125mg (AUGMENTIN) 875-125 mg per tablet; Take 1 tablet by mouth every 12 (twelve) hours. for 7 days  Dispense: 14 tablet; Refill: 0  - fluticasone propionate (FLONASE) 50 mcg/actuation nasal spray; 1 spray (50 mcg total) by Each Nostril route once daily.  Dispense: 16 mL; Refill: 0  - methylPREDNISolone (MEDROL DOSEPACK) 4 mg tablet; use as directed  Dispense: 1 each; Refill: 0    3. Influenza due to influenza A virus  - improving, completed only 2 days of Tamiflu    4. Prediabetes  - Hemoglobin A1C; Future    5. Premenstrual dysphoric disorder  - symptoms stable without medications    6. Vitamin D insufficiency  - Vitamin D; Future  - ergocalciferol  (ERGOCALCIFEROL) 50,000 unit Cap; Take 1 capsule (50,000 Units total) by mouth every 7 days.  Dispense: 12 capsule; Refill: 0    7. Abnormal CBC  - Ferritin; Future  - Iron and TIBC; Future    RTC in 1 year for annual exam or sooner if needed    __________________________    Janna Centeno MD, PharmD  [facility]- [facility] Medicine  American Board of Obesity Medicine diplomate  Office [phone]

## 2022-09-28 ENCOUNTER — TELEPHONE (OUTPATIENT)
Dept: INTERNAL MEDICINE | Facility: CLINIC | Age: 32
End: 2022-09-28
Payer: COMMERCIAL

## 2022-09-28 NOTE — TELEPHONE ENCOUNTER
----- Message from Janna Centeno MD sent at 9/26/2022  2:47 PM CDT -----  >>>>>>>>>> annual recall 9/27/2023

## 2022-10-01 ENCOUNTER — LAB VISIT (OUTPATIENT)
Dept: LAB | Facility: HOSPITAL | Age: 32
End: 2022-10-01
Attending: INTERNAL MEDICINE
Payer: COMMERCIAL

## 2022-10-01 DIAGNOSIS — Z00.00 ANNUAL PHYSICAL EXAM: ICD-10-CM

## 2022-10-01 LAB
BACTERIA #/AREA URNS AUTO: ABNORMAL /HPF
BILIRUB UR QL STRIP: NEGATIVE
CLARITY UR REFRACT.AUTO: ABNORMAL
COLOR UR AUTO: YELLOW
GLUCOSE UR QL STRIP: NEGATIVE
HGB UR QL STRIP: NEGATIVE
KETONES UR QL STRIP: NEGATIVE
LEUKOCYTE ESTERASE UR QL STRIP: ABNORMAL
MICROSCOPIC COMMENT: ABNORMAL
NITRITE UR QL STRIP: NEGATIVE
PH UR STRIP: 7 [PH] (ref 5–8)
PROT UR QL STRIP: ABNORMAL
RBC #/AREA URNS AUTO: 2 /HPF (ref 0–4)
SP GR UR STRIP: 1.02 (ref 1–1.03)
SQUAMOUS #/AREA URNS AUTO: 53 /HPF
URN SPEC COLLECT METH UR: ABNORMAL
WBC #/AREA URNS AUTO: 17 /HPF (ref 0–5)

## 2022-10-01 PROCEDURE — 81001 URINALYSIS AUTO W/SCOPE: CPT | Performed by: INTERNAL MEDICINE

## 2022-10-17 ENCOUNTER — PATIENT MESSAGE (OUTPATIENT)
Dept: INTERNAL MEDICINE | Facility: CLINIC | Age: 32
End: 2022-10-17
Payer: COMMERCIAL

## 2022-10-18 ENCOUNTER — OFFICE VISIT (OUTPATIENT)
Dept: OBSTETRICS AND GYNECOLOGY | Facility: CLINIC | Age: 32
End: 2022-10-18
Payer: COMMERCIAL

## 2022-10-18 VITALS
SYSTOLIC BLOOD PRESSURE: 120 MMHG | HEIGHT: 67 IN | WEIGHT: 246.94 LBS | BODY MASS INDEX: 38.76 KG/M2 | DIASTOLIC BLOOD PRESSURE: 72 MMHG

## 2022-10-18 DIAGNOSIS — Z01.419 ENCOUNTER FOR GYNECOLOGICAL EXAMINATION WITHOUT ABNORMAL FINDING: Primary | ICD-10-CM

## 2022-10-18 DIAGNOSIS — O99.343 DEPRESSION DURING PREGNANCY IN THIRD TRIMESTER: ICD-10-CM

## 2022-10-18 DIAGNOSIS — E55.9 VITAMIN D INSUFFICIENCY: ICD-10-CM

## 2022-10-18 DIAGNOSIS — R73.03 PREDIABETES: ICD-10-CM

## 2022-10-18 DIAGNOSIS — F32.A DEPRESSION DURING PREGNANCY IN THIRD TRIMESTER: ICD-10-CM

## 2022-10-18 DIAGNOSIS — R10.2 PELVIC PAIN IN FEMALE: ICD-10-CM

## 2022-10-18 DIAGNOSIS — F32.81 PREMENSTRUAL DYSPHORIC DISORDER: ICD-10-CM

## 2022-10-18 PROCEDURE — 99395 PR PREVENTIVE VISIT,EST,18-39: ICD-10-PCS | Mod: S$GLB,,, | Performed by: OBSTETRICS & GYNECOLOGY

## 2022-10-18 PROCEDURE — 3044F PR MOST RECENT HEMOGLOBIN A1C LEVEL <7.0%: ICD-10-PCS | Mod: CPTII,S$GLB,, | Performed by: OBSTETRICS & GYNECOLOGY

## 2022-10-18 PROCEDURE — 3078F DIAST BP <80 MM HG: CPT | Mod: CPTII,S$GLB,, | Performed by: OBSTETRICS & GYNECOLOGY

## 2022-10-18 PROCEDURE — 3044F HG A1C LEVEL LT 7.0%: CPT | Mod: CPTII,S$GLB,, | Performed by: OBSTETRICS & GYNECOLOGY

## 2022-10-18 PROCEDURE — 3074F SYST BP LT 130 MM HG: CPT | Mod: CPTII,S$GLB,, | Performed by: OBSTETRICS & GYNECOLOGY

## 2022-10-18 PROCEDURE — 1160F RVW MEDS BY RX/DR IN RCRD: CPT | Mod: CPTII,S$GLB,, | Performed by: OBSTETRICS & GYNECOLOGY

## 2022-10-18 PROCEDURE — 1160F PR REVIEW ALL MEDS BY PRESCRIBER/CLIN PHARMACIST DOCUMENTED: ICD-10-PCS | Mod: CPTII,S$GLB,, | Performed by: OBSTETRICS & GYNECOLOGY

## 2022-10-18 PROCEDURE — 99999 PR PBB SHADOW E&M-EST. PATIENT-LVL III: ICD-10-PCS | Mod: PBBFAC,,, | Performed by: OBSTETRICS & GYNECOLOGY

## 2022-10-18 PROCEDURE — 1159F MED LIST DOCD IN RCRD: CPT | Mod: CPTII,S$GLB,, | Performed by: OBSTETRICS & GYNECOLOGY

## 2022-10-18 PROCEDURE — 99999 PR PBB SHADOW E&M-EST. PATIENT-LVL III: CPT | Mod: PBBFAC,,, | Performed by: OBSTETRICS & GYNECOLOGY

## 2022-10-18 PROCEDURE — 3074F PR MOST RECENT SYSTOLIC BLOOD PRESSURE < 130 MM HG: ICD-10-PCS | Mod: CPTII,S$GLB,, | Performed by: OBSTETRICS & GYNECOLOGY

## 2022-10-18 PROCEDURE — 99395 PREV VISIT EST AGE 18-39: CPT | Mod: S$GLB,,, | Performed by: OBSTETRICS & GYNECOLOGY

## 2022-10-18 PROCEDURE — 87624 HPV HI-RISK TYP POOLED RSLT: CPT | Performed by: OBSTETRICS & GYNECOLOGY

## 2022-10-18 PROCEDURE — 88175 CYTOPATH C/V AUTO FLUID REDO: CPT | Performed by: OBSTETRICS & GYNECOLOGY

## 2022-10-18 PROCEDURE — 3078F PR MOST RECENT DIASTOLIC BLOOD PRESSURE < 80 MM HG: ICD-10-PCS | Mod: CPTII,S$GLB,, | Performed by: OBSTETRICS & GYNECOLOGY

## 2022-10-18 PROCEDURE — 1159F PR MEDICATION LIST DOCUMENTED IN MEDICAL RECORD: ICD-10-PCS | Mod: CPTII,S$GLB,, | Performed by: OBSTETRICS & GYNECOLOGY

## 2022-10-18 RX ORDER — KETOCONAZOLE 20 MG/G
CREAM TOPICAL DAILY
Qty: 60 G | Refills: 2 | Status: SHIPPED | OUTPATIENT
Start: 2022-10-18 | End: 2023-08-17

## 2022-10-18 NOTE — PROGRESS NOTES
HISTORY OF PRESENT ILLNESS:    Dora Hernandez is a 32 y.o. female, , Patient's last menstrual period was 2022.,  presents for a routine exam and has no complaints.  Patient reports cycles occur every 4 weeks lasting 5 days using 3-4 pads per day.   She denies any BTB.     She uses withdrawal method for birth control.   She does not desire STD screening.    The patient participates in regular exercise: no     The patient does not smoke.    The patient wears seatbelts.     Pt denies any domestic violence.    Past Medical History:   Diagnosis Date    Abnormal Pap smear     Anxiety     Depression     Migraine headache     Polycystic disease, ovaries        Past Surgical History:   Procedure Laterality Date    TYMPANOSTOMY         MEDICATIONS AND ALLERGIES:      Current Outpatient Medications:     ergocalciferol (ERGOCALCIFEROL) 50,000 unit Cap, Take 1 capsule (50,000 Units total) by mouth every 7 days., Disp: 12 capsule, Rfl: 0    fluticasone propionate (FLONASE) 50 mcg/actuation nasal spray, 1 spray (50 mcg total) by Each Nostril route once daily., Disp: 16 mL, Rfl: 0    ketoconazole (NIZORAL) 2 % cream, Apply topically once daily., Disp: 60 g, Rfl: 2    Review of patient's allergies indicates:   Allergen Reactions    Metformin Nausea And Vomiting       Family History   Problem Relation Age of Onset    Hypertension Father     Stroke Father     Thyroid disease Mother     Lung cancer Maternal Grandmother     Breast cancer Neg Hx     Colon cancer Neg Hx     Ovarian cancer Neg Hx        Social History     Socioeconomic History    Marital status:    Tobacco Use    Smoking status: Never    Smokeless tobacco: Never   Substance and Sexual Activity    Alcohol use: Yes    Drug use: No    Sexual activity: Yes     Partners: Male     Birth control/protection: None       COMPREHENSIVE GYN HISTORY:  PAP History: Denies abnormal Paps.  Infection History: Denies STDs. Denies PID.  Benign History: Denies  "uterine fibroids. Denies ovarian cysts. Denies endometriosis. Denies other conditions.  Cancer History: Denies cervical cancer. Denies uterine cancer or hyperplasia. Denies ovarian cancer. Denies vulvar cancer or pre-cancer. Denies vaginal cancer or pre-cancer. Denies breast cancer. Denies colon cancer.  Sexual Activity History: Reports currently being sexually active  Menstrual History: Monthly. Mod then light flow.   Dysmenorrhea History: Reports mild dysmenorrhea.       ROS:  GENERAL: No weight changes. No swelling. No fatigue. No fever.  CARDIOVASCULAR: No chest pain. No shortness of breath. No leg cramps.   NEUROLOGICAL: No headaches. No vision changes.  BREASTS: No pain. No lumps. No discharge.  ABDOMEN: No pain. No nausea. No vomiting. No diarrhea. No constipation.  REPRODUCTIVE: No abnormal bleeding.   VULVA: No pain. No lesions. No itching.  VAGINA: No relaxation. No itching. No odor. No discharge. No lesions.  URINARY: No incontinence. No nocturia. No frequency. No dysuria.    /72   Ht 5' 7" (1.702 m)   Wt 112 kg (246 lb 14.6 oz)   LMP 09/17/2022   BMI 38.67 kg/m²     PE:  APPEARANCE: Well nourished, well developed, in no acute distress.  AFFECT: WNL, alert and oriented x 3.  SKIN: No acne or hirsutism.  NECK: Neck symmetric, without masses or thyromegaly.  NODES: No inguinal, cervical, axillary or femoral lymph node enlargement.  CHEST: Good respiratory effort.   ABDOMEN: Soft. No tenderness or masses. No hepatosplenomegaly. No hernias.  BREASTS: Symmetrical, no skin changes, visible lesions, palpable masses or nipple discharge bilaterally.  PELVIC: External female genitalia without lesions.  Female hair distribution. Adequate perineal body, Normal urethral meatus. Vagina moist and well rugated without lesions or discharge.  No significant cystocele or rectocele present. Cervix pink without lesions, discharge or tenderness. Uterus is 4-6 week size, regular, mobile and nontender. Adnexa without " masses or tenderness.  EXTREMITIES: No edema    DIAGNOSIS:  1. Encounter for gynecological examination without abnormal finding    2. Depression during pregnancy in third trimester    3. Premenstrual dysphoric disorder    4. BMI 33.0-33.9,adult    5. Prediabetes    6. Vitamin D insufficiency        PLAN:    Orders Placed This Encounter    HPV High Risk Genotypes, PCR    Liquid-Based Pap Smear, Screening    ketoconazole (NIZORAL) 2 % cream       COUNSELING:  The patient was counseled today on:  -A.C.S. Pap and pelvic exam guidelines (pap every 3 years), recomendations for yearly mammogram;  -to follow up with her PCP for other health maintenance.    FOLLOW-UP with me annually.

## 2022-10-21 ENCOUNTER — PATIENT MESSAGE (OUTPATIENT)
Dept: OBSTETRICS AND GYNECOLOGY | Facility: CLINIC | Age: 32
End: 2022-10-21
Payer: COMMERCIAL

## 2022-10-21 LAB
FINAL PATHOLOGIC DIAGNOSIS: NORMAL
HPV HR 12 DNA SPEC QL NAA+PROBE: NEGATIVE
HPV16 AG SPEC QL: NEGATIVE
HPV18 DNA SPEC QL NAA+PROBE: NEGATIVE
Lab: NORMAL

## 2022-11-01 ENCOUNTER — PATIENT MESSAGE (OUTPATIENT)
Dept: INTERNAL MEDICINE | Facility: CLINIC | Age: 32
End: 2022-11-01
Payer: COMMERCIAL

## 2022-11-04 ENCOUNTER — HOSPITAL ENCOUNTER (OUTPATIENT)
Dept: RADIOLOGY | Facility: HOSPITAL | Age: 32
Discharge: HOME | End: 2022-11-04
Attending: OBSTETRICS & GYNECOLOGY
Payer: COMMERCIAL

## 2022-11-04 DIAGNOSIS — R10.2 PELVIC PAIN IN FEMALE: ICD-10-CM

## 2022-11-04 PROCEDURE — 76830 TRANSVAGINAL US NON-OB: CPT | Mod: 26,,, | Performed by: STUDENT IN AN ORGANIZED HEALTH CARE EDUCATION/TRAINING PROGRAM

## 2022-11-04 PROCEDURE — 76856 US EXAM PELVIC COMPLETE: CPT | Mod: 26,,, | Performed by: STUDENT IN AN ORGANIZED HEALTH CARE EDUCATION/TRAINING PROGRAM

## 2022-11-04 PROCEDURE — 76830 US PELVIS COMP WITH TRANSVAG NON-OB (XPD): ICD-10-PCS | Mod: 26,,, | Performed by: STUDENT IN AN ORGANIZED HEALTH CARE EDUCATION/TRAINING PROGRAM

## 2022-11-04 PROCEDURE — 76830 TRANSVAGINAL US NON-OB: CPT | Mod: TC

## 2022-11-04 PROCEDURE — 76856 US PELVIS COMP WITH TRANSVAG NON-OB (XPD): ICD-10-PCS | Mod: 26,,, | Performed by: STUDENT IN AN ORGANIZED HEALTH CARE EDUCATION/TRAINING PROGRAM

## 2022-12-13 ENCOUNTER — HOSPITAL ENCOUNTER (EMERGENCY)
Facility: HOSPITAL | Age: 32
Discharge: HOME | End: 2022-12-13
Attending: STUDENT IN AN ORGANIZED HEALTH CARE EDUCATION/TRAINING PROGRAM
Payer: COMMERCIAL

## 2022-12-13 VITALS
DIASTOLIC BLOOD PRESSURE: 85 MMHG | WEIGHT: 240 LBS | OXYGEN SATURATION: 98 % | HEIGHT: 67 IN | HEART RATE: 78 BPM | RESPIRATION RATE: 16 BRPM | BODY MASS INDEX: 37.67 KG/M2 | TEMPERATURE: 98 F | SYSTOLIC BLOOD PRESSURE: 126 MMHG

## 2022-12-13 DIAGNOSIS — R05.9 COUGH: ICD-10-CM

## 2022-12-13 DIAGNOSIS — B34.9 VIRAL SYNDROME: ICD-10-CM

## 2022-12-13 LAB
INFLUENZA A, MOLECULAR: NOT DETECTED
INFLUENZA B, MOLECULAR: NOT DETECTED
RSV AG BY MOLECULAR METHOD: NOT DETECTED
SARS-COV-2 RNA RESP QL NAA+PROBE: NOT DETECTED

## 2022-12-13 PROCEDURE — 93010 ELECTROCARDIOGRAM REPORT: CPT | Mod: ,,, | Performed by: INTERNAL MEDICINE

## 2022-12-13 PROCEDURE — 99284 PR EMERGENCY DEPT VISIT,LEVEL IV: ICD-10-PCS | Mod: ,,, | Performed by: STUDENT IN AN ORGANIZED HEALTH CARE EDUCATION/TRAINING PROGRAM

## 2022-12-13 PROCEDURE — 93005 ELECTROCARDIOGRAM TRACING: CPT

## 2022-12-13 PROCEDURE — 99284 EMERGENCY DEPT VISIT MOD MDM: CPT | Mod: ,,, | Performed by: STUDENT IN AN ORGANIZED HEALTH CARE EDUCATION/TRAINING PROGRAM

## 2022-12-13 PROCEDURE — 63600175 PHARM REV CODE 636 W HCPCS: Performed by: STUDENT IN AN ORGANIZED HEALTH CARE EDUCATION/TRAINING PROGRAM

## 2022-12-13 PROCEDURE — 0241U SARS-COV2 (COVID) WITH FLU/RSV BY PCR: CPT | Performed by: STUDENT IN AN ORGANIZED HEALTH CARE EDUCATION/TRAINING PROGRAM

## 2022-12-13 PROCEDURE — 93010 EKG 12-LEAD: ICD-10-PCS | Mod: ,,, | Performed by: INTERNAL MEDICINE

## 2022-12-13 PROCEDURE — 99284 EMERGENCY DEPT VISIT MOD MDM: CPT | Mod: 25

## 2022-12-13 PROCEDURE — 25000003 PHARM REV CODE 250: Performed by: STUDENT IN AN ORGANIZED HEALTH CARE EDUCATION/TRAINING PROGRAM

## 2022-12-13 RX ORDER — ACETAMINOPHEN 500 MG
1000 TABLET ORAL
Status: COMPLETED | OUTPATIENT
Start: 2022-12-13 | End: 2022-12-13

## 2022-12-13 RX ORDER — BENZONATATE 100 MG/1
100 CAPSULE ORAL 3 TIMES DAILY PRN
Qty: 15 CAPSULE | Refills: 0 | Status: SHIPPED | OUTPATIENT
Start: 2022-12-13 | End: 2023-02-24

## 2022-12-13 RX ADMIN — DEXAMETHASONE 6 MG: 4 TABLET ORAL at 09:12

## 2022-12-13 RX ADMIN — ACETAMINOPHEN 1000 MG: 500 TABLET ORAL at 09:12

## 2022-12-14 NOTE — ED PROVIDER NOTES
Encounter Date: 12/13/2022    SCRIBE #1 NOTE: I, Evie Robina, am scribing for, and in the presence of,  Christine Yang MD. I have scribed the following portions of the note - Other sections scribed: HPI, ROS, PE.     History     Chief Complaint   Patient presents with    Cough     Cough x weeks and today chest pain only when coughing. Painful to swallow. Denies fever in last 2 weeks      The patient is a 32 y.o. female with past medical history of Anxiety, depression, migraine headaches who presents for cough. Patient states she has had the cough for two weeks but today, she develop sore throat, congestion, myalgia. She states that now the cough is worse over the last few days. Cough is productive. She denies cp, sob, abdominal pain, fever.       The history is provided by the patient and medical records. No  was used.   Review of patient's allergies indicates:   Allergen Reactions    Metformin Nausea And Vomiting     Past Medical History:   Diagnosis Date    Abnormal Pap smear     Anxiety     Depression     Migraine headache     Polycystic disease, ovaries      Past Surgical History:   Procedure Laterality Date    TYMPANOSTOMY       Family History   Problem Relation Age of Onset    Hypertension Father     Stroke Father     Thyroid disease Mother     Lung cancer Maternal Grandmother     Breast cancer Neg Hx     Colon cancer Neg Hx     Ovarian cancer Neg Hx      Social History     Tobacco Use    Smoking status: Never    Smokeless tobacco: Never   Substance Use Topics    Alcohol use: Yes    Drug use: No     Review of Systems  Constitutional: No fever  HENT: No sore throat.+ Trouble swallowing  Eyes: No eye pain  Respiratory: No shortness of breath +cough  Cardiovascular: +chest pain. No leg swelling.  Gastrointestinal: No abdominal pain. +vomiting.   Genitourinary: No dysuria. No hematuria.  Musculoskeletal: No back pain. No myalgia.  Neurological: No headache  Psychiatric: No  agitation    Physical Exam     Initial Vitals [12/13/22 1828]   BP Pulse Resp Temp SpO2   127/74 85 19 98.4 °F (36.9 °C) 100 %      MAP       --         Physical Exam  Constitutional: No acute distress, well appearing  Respiratory: Non-labored. Lungs clear. No wheezing. No rales.   Cardiovascular: Well perfused. No edema. Normal rate and regular rhythm.  HENT: Uvula midline. Normal tonsils. No exudate. Moist mucous membranes.   Neck: Supple. Normal ROM.  Gastrointestinal: Soft, non-tender, non-distended  Integumentary: Warm and dry  Musculoskeletal: No deformity. No unilateral leg swelling, warmth or erythema. No calf tenderness.   Neurological: Awake and alert  Psychiatric: Cooperative    ED Course   Procedures  Labs Reviewed   SARS-COV2 (COVID) WITH FLU/RSV BY PCR          Imaging Results              X-Ray Chest PA And Lateral (Final result)  Result time 12/13/22 22:45:05      Final result by Victor Manuel Estrada MD (12/13/22 22:45:05)                   Impression:      No acute cardiopulmonary process.      Electronically signed by: Victor Manuel Estrada MD  Date:    12/13/2022  Time:    22:45               Narrative:    EXAMINATION:  XR CHEST PA AND LATERAL    CLINICAL HISTORY:  Cough, unspecified    TECHNIQUE:  PA and lateral views of the chest were performed.    COMPARISON:  07/20/2021.    FINDINGS:  There is no consolidation, effusion, or pneumothorax.    Cardiomediastinal silhouette is unremarkable.    Regional osseous structures are unremarkable.                                       Medications   dexAMETHasone tablet 6 mg (6 mg Oral Given 12/13/22 2141)   acetaminophen tablet 1,000 mg (1,000 mg Oral Given 12/13/22 2141)     Medical Decision Making:   History:   Old Medical Records: I decided to obtain old medical records.  Independently Interpreted Test(s):   I have ordered and independently interpreted X-rays - see prior notes.  I have ordered and independently interpreted EKG Reading(s) - see prior  notes  Clinical Tests:   Lab Tests: Ordered and Reviewed  Radiological Study: Ordered and Reviewed  Medical Tests: Reviewed and Ordered  ED Management:  Patient here with viral syndrome. OP benign. Lungs clear. Vitals and exam reassuring. PERC negative. CXR and viral swabs ordered. Dexamethasone ordered for sore throat as well as tylenol.     CXR clear. Viral  swabs negative. Patient remains well appearing on re-eval. Symptoms c/w viral etiology. Discussed supportive care and return precautions. Patient instructed to follow up with her PCP.        Scribe Attestation:   Scribe #1: I performed the above scribed service and the documentation accurately describes the services I performed. I attest to the accuracy of the note.      ED Course as of 12/14/22 0414 Tue Dec 13, 2022   2120 EKG with normal sinus rhythm, rate 72, normal intervals, no STEMI. [NN]      ED Course User Index  [NN] Christine Yang MD                 Clinical Impression:   Final diagnoses:  [R05.9] Cough  [B34.9] Viral syndrome        ED Disposition Condition    Discharge Stable          ED Prescriptions       Medication Sig Dispense Start Date End Date Auth. Provider    benzonatate (TESSALON) 100 MG capsule Take 1 capsule (100 mg total) by mouth 3 (three) times daily as needed for Cough. 15 capsule 12/13/2022 -- Christine Yang MD          Follow-up Information       Follow up With Specialties Details Why Contact Info    Janna Centeno MD Internal Medicine Schedule an appointment as soon as possible for a visit   [address] [facility] [address]  [phone]      [facility] - Emergency Dept Emergency Medicine  As needed, If symptoms worsen [address] [facility] [address] [phone]             Christine Yang MD  12/14/22 0416

## 2022-12-14 NOTE — DISCHARGE INSTRUCTIONS
You were treated for a viral syndrome today.  You can take the Tessalon Perles as prescribed for your cough.  You can take Tylenol or ibuprofen at home as needed for body aches, fever and sore throat.  If your symptoms worsen or you have any concerns, you can return to the emergency department for re-evaluation.  Please follow-up with your primary care doctor.

## 2023-01-05 ENCOUNTER — PATIENT MESSAGE (OUTPATIENT)
Dept: OBSTETRICS AND GYNECOLOGY | Facility: CLINIC | Age: 33
End: 2023-01-05
Payer: COMMERCIAL

## 2023-01-06 DIAGNOSIS — E55.9 VITAMIN D INSUFFICIENCY: ICD-10-CM

## 2023-01-09 RX ORDER — ERGOCALCIFEROL 1.25 MG/1
CAPSULE ORAL
Qty: 12 CAPSULE | Refills: 0 | Status: SHIPPED | OUTPATIENT
Start: 2023-01-09 | End: 2023-08-17

## 2023-01-31 DIAGNOSIS — B96.89 ACUTE BACTERIAL SINUSITIS: ICD-10-CM

## 2023-01-31 DIAGNOSIS — J01.90 ACUTE BACTERIAL SINUSITIS: ICD-10-CM

## 2023-01-31 RX ORDER — FLUTICASONE PROPIONATE 50 MCG
SPRAY, SUSPENSION (ML) NASAL
Qty: 48 G | OUTPATIENT
Start: 2023-01-31

## 2023-01-31 NOTE — TELEPHONE ENCOUNTER
No new care gaps identified.  [facility] Embedded Care Gaps. Reference number: [id number]. 1/31/2023   5:17:11 AM CST

## 2023-02-10 ENCOUNTER — OFFICE VISIT (OUTPATIENT)
Dept: INTERNAL MEDICINE | Facility: CLINIC | Age: 33
End: 2023-02-10
Payer: COMMERCIAL

## 2023-02-10 VITALS
HEART RATE: 72 BPM | WEIGHT: 233.69 LBS | BODY MASS INDEX: 36.68 KG/M2 | HEIGHT: 67 IN | OXYGEN SATURATION: 99 % | SYSTOLIC BLOOD PRESSURE: 100 MMHG | RESPIRATION RATE: 18 BRPM | DIASTOLIC BLOOD PRESSURE: 82 MMHG | TEMPERATURE: 97 F

## 2023-02-10 DIAGNOSIS — E55.9 VITAMIN D INSUFFICIENCY: ICD-10-CM

## 2023-02-10 DIAGNOSIS — F32.A ANXIETY AND DEPRESSION: Primary | ICD-10-CM

## 2023-02-10 DIAGNOSIS — R53.83 FATIGUE, UNSPECIFIED TYPE: ICD-10-CM

## 2023-02-10 DIAGNOSIS — R73.03 PREDIABETES: ICD-10-CM

## 2023-02-10 DIAGNOSIS — F41.9 ANXIETY AND DEPRESSION: Primary | ICD-10-CM

## 2023-02-10 PROCEDURE — 3074F PR MOST RECENT SYSTOLIC BLOOD PRESSURE < 130 MM HG: ICD-10-PCS | Mod: CPTII,S$GLB,, | Performed by: INTERNAL MEDICINE

## 2023-02-10 PROCEDURE — 99214 OFFICE O/P EST MOD 30 MIN: CPT | Mod: S$GLB,,, | Performed by: INTERNAL MEDICINE

## 2023-02-10 PROCEDURE — 1160F RVW MEDS BY RX/DR IN RCRD: CPT | Mod: CPTII,S$GLB,, | Performed by: INTERNAL MEDICINE

## 2023-02-10 PROCEDURE — 1160F PR REVIEW ALL MEDS BY PRESCRIBER/CLIN PHARMACIST DOCUMENTED: ICD-10-PCS | Mod: CPTII,S$GLB,, | Performed by: INTERNAL MEDICINE

## 2023-02-10 PROCEDURE — 3008F BODY MASS INDEX DOCD: CPT | Mod: CPTII,S$GLB,, | Performed by: INTERNAL MEDICINE

## 2023-02-10 PROCEDURE — 3079F PR MOST RECENT DIASTOLIC BLOOD PRESSURE 80-89 MM HG: ICD-10-PCS | Mod: CPTII,S$GLB,, | Performed by: INTERNAL MEDICINE

## 2023-02-10 PROCEDURE — 99214 PR OFFICE/OUTPT VISIT, EST, LEVL IV, 30-39 MIN: ICD-10-PCS | Mod: S$GLB,,, | Performed by: INTERNAL MEDICINE

## 2023-02-10 PROCEDURE — 1159F PR MEDICATION LIST DOCUMENTED IN MEDICAL RECORD: ICD-10-PCS | Mod: CPTII,S$GLB,, | Performed by: INTERNAL MEDICINE

## 2023-02-10 PROCEDURE — 3079F DIAST BP 80-89 MM HG: CPT | Mod: CPTII,S$GLB,, | Performed by: INTERNAL MEDICINE

## 2023-02-10 PROCEDURE — 3074F SYST BP LT 130 MM HG: CPT | Mod: CPTII,S$GLB,, | Performed by: INTERNAL MEDICINE

## 2023-02-10 PROCEDURE — 99999 PR PBB SHADOW E&M-EST. PATIENT-LVL III: CPT | Mod: PBBFAC,,, | Performed by: INTERNAL MEDICINE

## 2023-02-10 PROCEDURE — 99999 PR PBB SHADOW E&M-EST. PATIENT-LVL III: ICD-10-PCS | Mod: PBBFAC,,, | Performed by: INTERNAL MEDICINE

## 2023-02-10 PROCEDURE — 1159F MED LIST DOCD IN RCRD: CPT | Mod: CPTII,S$GLB,, | Performed by: INTERNAL MEDICINE

## 2023-02-10 PROCEDURE — 3008F PR BODY MASS INDEX (BMI) DOCUMENTED: ICD-10-PCS | Mod: CPTII,S$GLB,, | Performed by: INTERNAL MEDICINE

## 2023-02-10 RX ORDER — BUPROPION HYDROCHLORIDE 150 MG/1
150 TABLET ORAL DAILY
Qty: 30 TABLET | Refills: 0 | Status: SHIPPED | OUTPATIENT
Start: 2023-02-10 | End: 2023-04-03

## 2023-02-10 NOTE — PROGRESS NOTES
Subjective:     Dora Hernandez is a 33 y.o. female who presents for   Chief Complaint   Patient presents with    Follow-up    Anxiety    Pre-diabetes    Fatigue       HPI      Anxiety: She presents for follow up of anxiety disorder. In the past, she was prescribed Lexapro and then Prozac but she is not sure if she took either of them consistently. She reports that there are lots of expectations at work annd home for her to handle everything and she completes the tasks even if sh does not have time. Discussed the origins of her need to fulfill all expectations and she seems willing to explore this with journaling although seeing a therapist may be more helpful. Current symptoms: difficulty concentrating, fatigue, racing thoughts. She denies current suicidal and homicidal ideation. She complains of the following side effects from the treatment: she cannot recall any significant side effects.      Fatigue: The patient presents for evaluation of fatigue. Symptoms began several years ago.  Symptoms of her fatigue have been anxiousness. Patient describes the following psychological symptoms: stress at work and stress in the family. Patient denies cold intolerance, fever, and significant change in weight. . Symptom severity: moderate.       Pre-diabetes: Patient is unaware of a family history of diabetes. Her weight has fluctuated but she recently lost weight. She has been exercising 5x weekly.  The patient has/has not make any changes to diet. She denies excessive urination, no excessive thirst and no excessive hunger.     Lab Results   Component Value Date    HGBA1C 5.9 (H) 10/01/2022     10/01/2022       Body mass index is 36.6 kg/m².  Wt Readings from Last 3 Encounters:   02/10/23 106 kg (233 lb 11 oz)   12/13/22 108.9 kg (240 lb)   10/18/22 112 kg (246 lb 14.6 oz)   5x weekly      Review of Systems   Constitutional:  Positive for fatigue. Negative for chills, diaphoresis and fever.   HENT:   Negative for congestion, ear pain, sinus pressure and sore throat.    Eyes:  Negative for discharge and visual disturbance.   Respiratory:  Negative for cough and shortness of breath.    Cardiovascular:  Negative for chest pain, palpitations and leg swelling.   Gastrointestinal:  Negative for abdominal pain, constipation, diarrhea, nausea and vomiting.   Musculoskeletal:  Negative for arthralgias and myalgias.   Neurological:  Negative for dizziness, tremors, weakness, numbness and headaches.   Psychiatric/Behavioral:  Positive for decreased concentration. Negative for dysphoric mood. The patient is nervous/anxious (but she is still hesitant to try medication, had a near-panic attack earlier todaywith chest tightness but she was able to calm herself down).         She has difficulty with recall of distant events.         Objective:     Physical Exam  Vitals reviewed.   Constitutional:       General: She is awake. She is not in acute distress.     Appearance: Normal appearance. She is well-developed and well-groomed.   HENT:      Head: Normocephalic and atraumatic.      Right Ear: Hearing and external ear normal.      Left Ear: Hearing and external ear normal.      Nose: Nose normal. No congestion.      Mouth/Throat:      Mouth: Mucous membranes are moist.   Eyes:      General: Lids are normal. Vision grossly intact.   Cardiovascular:      Rate and Rhythm: Normal rate and regular rhythm.      Heart sounds: Normal heart sounds. No murmur heard.  Pulmonary:      Effort: Pulmonary effort is normal.      Breath sounds: Normal breath sounds. No decreased breath sounds or wheezing.   Abdominal:      General: Bowel sounds are normal. There is no distension.   Musculoskeletal:         General: Normal range of motion.      Cervical back: Normal range of motion.      Right lower leg: No edema.      Left lower leg: No edema.   Skin:     General: Skin is warm and dry.      Findings: No lesion or rash.   Neurological:      Mental  Status: She is alert and oriented to person, place, and time.   Psychiatric:         Attention and Perception: Attention normal.         Mood and Affect: Mood is anxious. Affect is tearful.         Behavior: Behavior is cooperative.          Assessment:      1. Anxiety and depression    2. Fatigue, unspecified type    3. Prediabetes    4. Vitamin D insufficiency           Plan:     1. Anxiety and depression  - will start wellbutrin and increase dose after 1 month if needed  - buPROPion (WELLBUTRIN XL) 150 MG TB24 tablet; Take 1 tablet (150 mg total) by mouth once daily.  Dispense: 30 tablet; Refill: 0  - advised patient to consider finding time to se a therapist or consider telehealth provider    2. Fatigue, unspecified type  - Vitamin D; Future  - check cbc    3. Prediabetes  - Basic Metabolic Panel; Future  - Hemoglobin A1C; Future    4. Vitamin D insufficiency  - Vitamin D; Future    RTC in 3 months for follow-up or sooner if needed    __________________________    Janna Centeno MD, PharmD  [facility]- Internal Medicine  American Board of Obesity Medicine diplomate  Office [phone]

## 2023-04-01 ENCOUNTER — TELEPHONE (OUTPATIENT)
Dept: INTERNAL MEDICINE | Facility: CLINIC | Age: 33
End: 2023-04-01
Payer: COMMERCIAL

## 2023-04-01 DIAGNOSIS — F41.9 ANXIETY AND DEPRESSION: ICD-10-CM

## 2023-04-01 DIAGNOSIS — F32.A ANXIETY AND DEPRESSION: ICD-10-CM

## 2023-04-01 NOTE — TELEPHONE ENCOUNTER
No new care gaps identified.  [facility] Embedded Care Gaps. Reference number: [id number]. 4/01/2023   5:33:07 AM CHERELLET

## 2023-04-01 NOTE — TELEPHONE ENCOUNTER
Refill Routing Note   Medication(s) are not appropriate for processing by [facility] for the following reason(s):      New or recently adjusted medication    [facility] action(s):  Defer       Medication Therapy Plan: New start (2/10/23); Defer      Appointments  past 12m or future 3m with PCP    Date Provider   Last Visit   2/10/2023 Janna Centeno MD   Next Visit   Visit date not found Janna Cetneno MD   ED visits in past 90 days: 0        Note composed:5:40 PM 04/01/2023

## 2023-04-03 RX ORDER — BUPROPION HYDROCHLORIDE 150 MG/1
TABLET ORAL
Qty: 30 TABLET | Refills: 2 | Status: SHIPPED | OUTPATIENT
Start: 2023-04-03 | End: 2023-08-17

## 2023-04-03 NOTE — TELEPHONE ENCOUNTER
Refilled wellbutrin XL 150mg daily. If there is no improvement with anxiety or concentration, then may increase to 300mg/d.

## 2023-04-10 ENCOUNTER — OFFICE VISIT (OUTPATIENT)
Dept: URGENT CARE | Facility: CLINIC | Age: 33
End: 2023-04-10
Payer: COMMERCIAL

## 2023-04-10 VITALS
HEIGHT: 67 IN | OXYGEN SATURATION: 97 % | RESPIRATION RATE: 18 BRPM | WEIGHT: 233 LBS | TEMPERATURE: 99 F | BODY MASS INDEX: 36.57 KG/M2 | HEART RATE: 85 BPM | DIASTOLIC BLOOD PRESSURE: 67 MMHG | SYSTOLIC BLOOD PRESSURE: 110 MMHG

## 2023-04-10 DIAGNOSIS — H10.022 PINK EYE, LEFT: ICD-10-CM

## 2023-04-10 DIAGNOSIS — H53.8 BLURRED VISION, LEFT EYE: ICD-10-CM

## 2023-04-10 DIAGNOSIS — T26.92XA CHEMICAL INJURY OF LEFT EYE, INITIAL ENCOUNTER: Primary | ICD-10-CM

## 2023-04-10 PROCEDURE — 99214 OFFICE O/P EST MOD 30 MIN: CPT | Mod: S$GLB,,, | Performed by: FAMILY MEDICINE

## 2023-04-10 PROCEDURE — 99214 PR OFFICE/OUTPT VISIT, EST, LEVL IV, 30-39 MIN: ICD-10-PCS | Mod: S$GLB,,, | Performed by: FAMILY MEDICINE

## 2023-04-10 RX ORDER — ERYTHROMYCIN 5 MG/G
OINTMENT OPHTHALMIC 3 TIMES DAILY
Qty: 3.5 G | Refills: 0 | Status: SHIPPED | OUTPATIENT
Start: 2023-04-10 | End: 2023-08-17

## 2023-04-10 NOTE — PROGRESS NOTES
"Subjective:      Patient ID: Dora Hernandez is a 33 y.o. female.    Vitals:  height is 5' 7" (1.702 m) and weight is 105.7 kg (233 lb). Her temperature is 98.5 °F (36.9 °C). Her blood pressure is 110/67 and her pulse is 85. Her respiration is 18 and oxygen saturation is 97%.     Chief Complaint: Eye Pain    Pt reports small amount of paint thinner splashed into her left eye 3 days ago.  Reports pink eye, eye irritation and mild blurry vision.      Eye Pain   The left eye is affected. This is a new problem. The current episode started in the past 7 days (friday). The problem occurs constantly. The problem has been unchanged. The injury mechanism was a chemical exposure. The pain is at a severity of 4/10. The pain is moderate. There is No known exposure to pink eye. She Does not wear contacts. Associated symptoms include blurred vision and eye redness. Pertinent negatives include no eye discharge, double vision, fever, foreign body sensation, itching, nausea, photophobia, recent URI or vomiting. She has tried nothing for the symptoms.     Constitution: Negative for fever.   Eyes:  Positive for eye pain, eye redness and blurred vision. Negative for eye discharge, eye itching, photophobia and double vision.   Gastrointestinal:  Negative for nausea and vomiting.    Objective:     Physical Exam   Constitutional: She is oriented to person, place, and time. She appears well-developed.   HENT:   Head: Normocephalic and atraumatic.   Ears:   Right Ear: Tympanic membrane, external ear and ear canal normal. impacted cerumen  Left Ear: Tympanic membrane, external ear and ear canal normal. impacted cerumen  Nose: Nose normal. No rhinorrhea or congestion.   Mouth/Throat: Oropharynx is clear and moist. No oropharyngeal exudate or posterior oropharyngeal erythema.   Eyes: Conjunctivae, EOM and lids are normal. Pupils are equal, round, and reactive to light. Extraocular movement intact      Comments:    Left Eye:    +ve " conjunctival erythema.   No discharge.  No swelling.  Visual acuity at baseline.    Fluorescein Exam: negative for abrasion or FB.       Neck: Trachea normal and phonation normal. Neck supple.   Cardiovascular:   No murmur heard.  Pulmonary/Chest: No stridor. No respiratory distress. She has no wheezes. She has no rhonchi. She has no rales.   Musculoskeletal: Normal range of motion.         General: Normal range of motion.   Neurological: She is alert and oriented to person, place, and time.   Skin: Skin is warm, dry and intact.   Psychiatric: Her speech is normal and behavior is normal. Judgment and thought content normal.   Nursing note and vitals reviewed.    Assessment:     1. Chemical injury of left eye, initial encounter    2. Pink eye, left    3. Blurred vision, left eye        Plan:   Discussed diagnosis/plan with patient in clinic.  Ophthalmology referral given for persistent symptoms. ER precautions given if symptoms get any worse. All questions answered. Patient verbally understood and agreed with treatment plan.     Chemical injury of left eye, initial encounter  -     Ambulatory referral/consult to Ophthalmology    Pink eye, left    Blurred vision, left eye    Other orders  -     erythromycin (ROMYCIN) ophthalmic ointment; Place into the left eye 3 (three) times daily.  Dispense: 3.5 g; Refill: 0

## 2023-05-14 ENCOUNTER — PATIENT MESSAGE (OUTPATIENT)
Dept: OBSTETRICS AND GYNECOLOGY | Facility: CLINIC | Age: 33
End: 2023-05-14
Payer: COMMERCIAL

## 2023-05-15 NOTE — TELEPHONE ENCOUNTER
S/w pt   She has an appt scheduled for sept  Offered sooner appt with another provider  Pt request only Dr. Short  Will add to wait list/send to [facility] for any sooner availability

## 2023-07-19 ENCOUNTER — PATIENT MESSAGE (OUTPATIENT)
Dept: RESEARCH | Facility: HOSPITAL | Age: 33
End: 2023-07-19
Payer: COMMERCIAL

## 2023-08-17 ENCOUNTER — OFFICE VISIT (OUTPATIENT)
Dept: URGENT CARE | Facility: CLINIC | Age: 33
End: 2023-08-17
Payer: COMMERCIAL

## 2023-08-17 VITALS
HEART RATE: 73 BPM | DIASTOLIC BLOOD PRESSURE: 82 MMHG | TEMPERATURE: 98 F | WEIGHT: 233 LBS | HEIGHT: 67 IN | OXYGEN SATURATION: 97 % | RESPIRATION RATE: 16 BRPM | SYSTOLIC BLOOD PRESSURE: 114 MMHG | BODY MASS INDEX: 36.57 KG/M2

## 2023-08-17 DIAGNOSIS — J06.9 VIRAL URI: Primary | ICD-10-CM

## 2023-08-17 LAB
CTP QC/QA: YES
SARS-COV-2 AG RESP QL IA.RAPID: NEGATIVE

## 2023-08-17 PROCEDURE — 99213 OFFICE O/P EST LOW 20 MIN: CPT | Mod: S$GLB,,, | Performed by: PHYSICIAN ASSISTANT

## 2023-08-17 PROCEDURE — 87811 SARS-COV-2 COVID19 W/OPTIC: CPT | Mod: QW,S$GLB,, | Performed by: PHYSICIAN ASSISTANT

## 2023-08-17 PROCEDURE — 99213 PR OFFICE/OUTPT VISIT, EST, LEVL III, 20-29 MIN: ICD-10-PCS | Mod: S$GLB,,, | Performed by: PHYSICIAN ASSISTANT

## 2023-08-17 PROCEDURE — 87811 SARS CORONAVIRUS 2 ANTIGEN POCT, MANUAL READ: ICD-10-PCS | Mod: QW,S$GLB,, | Performed by: PHYSICIAN ASSISTANT

## 2023-08-17 NOTE — PROGRESS NOTES
"Subjective:      Patient ID: Dora Hernandez is a 33 y.o. female.    Vitals:  height is 5' 7" (1.702 m) and weight is 105.7 kg (233 lb). Her oral temperature is 98.2 °F (36.8 °C). Her blood pressure is 114/82 and her pulse is 73. Her respiration is 16 and oxygen saturation is 97%.     Chief Complaint: Cough    This is a 33 y.o. female who presents today with a chief complaint of cough, nasal congestion/pressure, and body aches since Saturday. Denies fevers. Says she has some coworkers who have tested positive for Covid.        Cough  This is a new problem. The current episode started in the past 7 days. The problem has been unchanged. The cough is Productive of sputum. Associated symptoms include chills, myalgias, nasal congestion, postnasal drip and a sore throat. Pertinent negatives include no chest pain, ear congestion, ear pain, fever, headaches, heartburn, hemoptysis, rash, rhinorrhea, shortness of breath, sweats, weight loss or wheezing. Nothing aggravates the symptoms. She has tried nothing for the symptoms. The treatment provided no relief. Her past medical history is significant for environmental allergies.     Constitution: Positive for chills. Negative for fever.   HENT:  Positive for congestion, postnasal drip, sinus pressure and sore throat. Negative for ear pain, ear discharge and sinus pain.    Neck: Negative for neck pain, neck stiffness and painful lymph nodes.   Cardiovascular:  Negative for chest pain and sob on exertion.   Eyes:  Negative for eye discharge and eye itching.   Respiratory:  Positive for cough and sputum production. Negative for bloody sputum, shortness of breath and wheezing.    Gastrointestinal:  Negative for abdominal pain, nausea, vomiting, constipation and heartburn.   Musculoskeletal:  Positive for muscle ache.   Skin:  Negative for rash.   Allergic/Immunologic: Positive for environmental allergies.   Neurological:  Negative for dizziness, headaches and altered mental " status.   Hematologic/Lymphatic: Negative for swollen lymph nodes.   Psychiatric/Behavioral:  Negative for altered mental status.       Past Medical History:   Diagnosis Date    Abnormal Pap smear     Anxiety     Depression     Migraine headache     Polycystic disease, ovaries        Past Surgical History:   Procedure Laterality Date    TYMPANOSTOMY         Family History   Problem Relation Age of Onset    Hypertension Father     Stroke Father     Thyroid disease Mother     Lung cancer Maternal Grandmother     Breast cancer Neg Hx     Colon cancer Neg Hx     Ovarian cancer Neg Hx        Social History     Socioeconomic History    Marital status:    Tobacco Use    Smoking status: Never    Smokeless tobacco: Never   Substance and Sexual Activity    Alcohol use: Yes    Drug use: No    Sexual activity: Yes     Partners: Male     Birth control/protection: None     Social Determinants of Health     Financial Resource Strain: Medium Risk (4/23/2019)    Overall Financial Resource Strain (CARDIA)     Difficulty of Paying Living Expenses: Somewhat hard   Food Insecurity: Food Insecurity Present (4/23/2019)    Hunger Vital Sign     Worried About Running Out of Food in the Last Year: Sometimes true     Ran Out of Food in the Last Year: Sometimes true   Transportation Needs: No Transportation Needs (4/23/2019)    PRAPARE - Transportation     Lack of Transportation (Medical): No     Lack of Transportation (Non-Medical): No   Physical Activity: Inactive (4/23/2019)    Exercise Vital Sign     Days of Exercise per Week: 0 days     Minutes of Exercise per Session: 0 min   Stress: Stress Concern Present (4/23/2019)    [employer] - Occupational Stress Questionnaire     Feeling of Stress : To some extent   Social Connections: Unknown (4/23/2019)    Social Connection and Isolation Panel [NHANES]     Frequency of Communication with Friends and Family: More than three times a week     Frequency of  Social Gatherings with Friends and Family: More than three times a week     Active Member of Clubs or Organizations: Yes     Attends Club or Organization Meetings: More than 4 times per year     Marital Status:        Current Outpatient Medications   Medication Sig Dispense Refill    fluticasone propionate (FLONASE) 50 mcg/actuation nasal spray SHAKE LIQUID AND USE 1 SPRAY(50 MCG) IN EACH NOSTRIL EVERY DAY 48 g 3     No current facility-administered medications for this visit.       Review of patient's allergies indicates:   Allergen Reactions    Metformin Nausea And Vomiting       Objective:     Physical Exam   Constitutional: She is oriented to person, place, and time. She appears well-developed.  Non-toxic appearance. She does not appear ill. No distress. normal  HENT:   Head: Normocephalic and atraumatic.   Ears:   Right Ear: External ear and ear canal normal. A middle ear effusion is present. impacted cerumen  Left Ear: External ear and ear canal normal. A middle ear effusion is present. impacted cerumen  Nose: Congestion present. No rhinorrhea.   Mouth/Throat: Mucous membranes are normal. Mucous membranes are moist. No oropharyngeal exudate or posterior oropharyngeal erythema. Oropharynx is clear.   Eyes: Conjunctivae and lids are normal. Right eye exhibits no discharge. Left eye exhibits no discharge.   Neck: Trachea normal. Neck supple. No neck rigidity present.   Cardiovascular: Normal rate, regular rhythm and normal pulses.   Murmur heard.Exam reveals no gallop and no friction rub.   Pulmonary/Chest: Effort normal and breath sounds normal. No stridor. No respiratory distress. She has no wheezes. She has no rhonchi. She has no rales. She exhibits no tenderness.   Abdominal: Normal appearance. Soft.   Musculoskeletal:      Cervical back: She exhibits no tenderness.   Lymphadenopathy:     She has no cervical adenopathy.   Neurological: She is alert and oriented to person, place, and time. She has  "normal strength.   Skin: Skin is warm, dry, intact, not diaphoretic, not pale and no rash.   Psychiatric: Her speech is normal and behavior is normal. Mood, judgment and thought content normal.   Nursing note and vitals reviewed.    Results for orders placed or performed in visit on 08/17/23   SARS Coronavirus 2 Antigen, POCT Manual Read   Result Value Ref Range    SARS Coronavirus 2 Antigen Negative Negative     Acceptable Yes          Assessment:     1. Viral URI        Plan:       Viral URI  -     SARS Coronavirus 2 Antigen, POCT Manual Read    Results reviewed  I have reviewed the patient chart and pertinent past imaging/labs.  Patient Instructions                                                            URI   If your condition worsens or fails to improve we recommend that you receive another evaluation at the urgent care/ER immediately or contact your PCP to discuss your concerns. You must understand that you've received an urgent care treatment only and that you may be released before all your medical problems are known or treated. You the patient will arrange for follouwp care as instructed.   If we discussed that I think your illness is viral, it will not respond to antibiotics and will last 10-14 days.       Flonase (fluticasone) is a nasal spray which is available over the counter and may help with your symptoms.   Zyrtec D, Claritin D or Allegra D can also help with symptoms of congestion and drainage.   If you have hypertension avoid using the "D" which is the decongestant   If you just have drainage you can take plain zyrtec, claritin or allegra   If you just have a congested feeling you can take pseudoephedrine (unless you have high blood pressure) which you have to sign for behind the counter.   Rest and fluids are also important.     Salt water gargles, warm tea with honey and chloraseptic spray as needed for sore throat.   Tylenol or ibuprofen can also be used as directed for pain " unless you have an allergy to them or medical condition such as stomach ulcers, kidney or liver disease or blood thinners etc for which you should not be taking these type of medications.

## 2023-08-21 ENCOUNTER — PATIENT MESSAGE (OUTPATIENT)
Dept: RESEARCH | Facility: HOSPITAL | Age: 33
End: 2023-08-21
Payer: COMMERCIAL

## 2023-09-01 ENCOUNTER — PATIENT MESSAGE (OUTPATIENT)
Dept: INTERNAL MEDICINE | Facility: CLINIC | Age: 33
End: 2023-09-01
Payer: COMMERCIAL

## 2023-09-01 NOTE — TELEPHONE ENCOUNTER
Spoke with pcp, advised pt to increase water and schedule lab work, pcp will review labs and go from there

## 2023-10-06 ENCOUNTER — PATIENT MESSAGE (OUTPATIENT)
Dept: OBSTETRICS AND GYNECOLOGY | Facility: CLINIC | Age: 33
End: 2023-10-06
Payer: COMMERCIAL

## 2023-10-24 ENCOUNTER — OFFICE VISIT (OUTPATIENT)
Dept: ORTHOPEDICS | Facility: CLINIC | Age: 33
End: 2023-10-24
Payer: COMMERCIAL

## 2023-10-24 VITALS — HEIGHT: 67 IN | BODY MASS INDEX: 36.57 KG/M2 | WEIGHT: 233 LBS

## 2023-10-24 DIAGNOSIS — Q66.89 TARSAL COALITION OF RIGHT FOOT: Primary | ICD-10-CM

## 2023-10-24 DIAGNOSIS — M19.071 ARTHRITIS OF RIGHT SUBTALAR JOINT: ICD-10-CM

## 2023-10-24 PROCEDURE — 99999 PR PBB SHADOW E&M-EST. PATIENT-LVL III: CPT | Mod: PBBFAC,,, | Performed by: ORTHOPAEDIC SURGERY

## 2023-10-24 PROCEDURE — 99213 PR OFFICE/OUTPT VISIT, EST, LEVL III, 20-29 MIN: ICD-10-PCS | Mod: 25,S$GLB,, | Performed by: ORTHOPAEDIC SURGERY

## 2023-10-24 PROCEDURE — 3008F BODY MASS INDEX DOCD: CPT | Mod: CPTII,S$GLB,, | Performed by: ORTHOPAEDIC SURGERY

## 2023-10-24 PROCEDURE — 1159F MED LIST DOCD IN RCRD: CPT | Mod: CPTII,S$GLB,, | Performed by: ORTHOPAEDIC SURGERY

## 2023-10-24 PROCEDURE — 1159F PR MEDICATION LIST DOCUMENTED IN MEDICAL RECORD: ICD-10-PCS | Mod: CPTII,S$GLB,, | Performed by: ORTHOPAEDIC SURGERY

## 2023-10-24 PROCEDURE — 99999 PR PBB SHADOW E&M-EST. PATIENT-LVL III: ICD-10-PCS | Mod: PBBFAC,,, | Performed by: ORTHOPAEDIC SURGERY

## 2023-10-24 PROCEDURE — 99213 OFFICE O/P EST LOW 20 MIN: CPT | Mod: 25,S$GLB,, | Performed by: ORTHOPAEDIC SURGERY

## 2023-10-24 PROCEDURE — 20605 DRAIN/INJ JOINT/BURSA W/O US: CPT | Mod: RT,S$GLB,, | Performed by: ORTHOPAEDIC SURGERY

## 2023-10-24 PROCEDURE — 20605 PR DRAIN/INJECT INTERMEDIATE JOINT/BURSA: ICD-10-PCS | Mod: RT,S$GLB,, | Performed by: ORTHOPAEDIC SURGERY

## 2023-10-24 PROCEDURE — 3008F PR BODY MASS INDEX (BMI) DOCUMENTED: ICD-10-PCS | Mod: CPTII,S$GLB,, | Performed by: ORTHOPAEDIC SURGERY

## 2023-10-24 RX ORDER — TRIAMCINOLONE ACETONIDE 40 MG/ML
40 INJECTION, SUSPENSION INTRA-ARTICULAR; INTRAMUSCULAR
Status: COMPLETED | OUTPATIENT
Start: 2023-10-24 | End: 2023-10-24

## 2023-10-24 RX ADMIN — TRIAMCINOLONE ACETONIDE 40 MG: 40 INJECTION, SUSPENSION INTRA-ARTICULAR; INTRAMUSCULAR at 11:10

## 2023-10-24 NOTE — PROGRESS NOTES
Dora Hernandez  Returns today for follow-up.  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  We had discussed treatment options on that last visit including corticosteroid injection which she had in the past as well as orthotics.  I also recommended obtaining a CT scan to better evaluate the subtalar joint.  She returns today and reports that over the past year she has had continued symptoms.  She did obtain the orthotics which does not really help and she reports that arthritis medicines really do not help either.  She comes in to discuss surgical options.  She is a  and is on her feet for long periods on weekends and she reports that by the end of the events in the next day she is in significant pain.  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    Examination:  She walks in today with a normal gait.  On standing inspection she has plantigrade alignment of her feet.  There is no obvious swelling of her right foot today.  On sitting exam she has functional motion of her right ankle but has decreased motion of her right subtalar joint compared to the left with some mild discomfort.  She is tender in the sinus tarsi area of her right hindfoot.  She is neurovascularly intact.    Impression:  1. Tarsal coalition of right foot, talocalcaneal joint  triamcinolone acetonide injection 40 mg      2. Arthritis of right subtalar joint  triamcinolone acetonide injection 40 mg            Recommendation:  We had discussion about surgical options.  She was inquiring about resection of the coalition, but I explained to her she already has significant arthritis and resection would likely not give her any significant relief.  I think fusion surgery would be her only option.  We discussed fusion surgery and the postoperative course.  I also offered a corticosteroid injection while she considers  her surgical option.  She agreed to the injection.      After verbal consent and sterile prep I injected the right subtalar joint with 2 cc lidocaine and 40 mg of triamcinolone.    Follow-up in three months necessary

## 2023-11-03 ENCOUNTER — OFFICE VISIT (OUTPATIENT)
Dept: OBSTETRICS AND GYNECOLOGY | Facility: CLINIC | Age: 33
End: 2023-11-03
Payer: COMMERCIAL

## 2023-11-03 VITALS
BODY MASS INDEX: 38.06 KG/M2 | HEIGHT: 67 IN | SYSTOLIC BLOOD PRESSURE: 100 MMHG | DIASTOLIC BLOOD PRESSURE: 78 MMHG | WEIGHT: 242.5 LBS

## 2023-11-03 DIAGNOSIS — F32.81 PREMENSTRUAL DYSPHORIC DISORDER: ICD-10-CM

## 2023-11-03 DIAGNOSIS — Z01.419 ENCOUNTER FOR GYNECOLOGICAL EXAMINATION WITHOUT ABNORMAL FINDING: Primary | ICD-10-CM

## 2023-11-03 DIAGNOSIS — R10.2 PELVIC PAIN: ICD-10-CM

## 2023-11-03 DIAGNOSIS — B37.31 YEAST VAGINITIS: ICD-10-CM

## 2023-11-03 LAB
B-HCG UR QL: NEGATIVE
CTP QC/QA: YES

## 2023-11-03 PROCEDURE — 1160F PR REVIEW ALL MEDS BY PRESCRIBER/CLIN PHARMACIST DOCUMENTED: ICD-10-PCS | Mod: CPTII,S$GLB,, | Performed by: OBSTETRICS & GYNECOLOGY

## 2023-11-03 PROCEDURE — 3078F PR MOST RECENT DIASTOLIC BLOOD PRESSURE < 80 MM HG: ICD-10-PCS | Mod: CPTII,S$GLB,, | Performed by: OBSTETRICS & GYNECOLOGY

## 2023-11-03 PROCEDURE — 1159F MED LIST DOCD IN RCRD: CPT | Mod: CPTII,S$GLB,, | Performed by: OBSTETRICS & GYNECOLOGY

## 2023-11-03 PROCEDURE — 3008F PR BODY MASS INDEX (BMI) DOCUMENTED: ICD-10-PCS | Mod: CPTII,S$GLB,, | Performed by: OBSTETRICS & GYNECOLOGY

## 2023-11-03 PROCEDURE — 1160F RVW MEDS BY RX/DR IN RCRD: CPT | Mod: CPTII,S$GLB,, | Performed by: OBSTETRICS & GYNECOLOGY

## 2023-11-03 PROCEDURE — 3078F DIAST BP <80 MM HG: CPT | Mod: CPTII,S$GLB,, | Performed by: OBSTETRICS & GYNECOLOGY

## 2023-11-03 PROCEDURE — 3074F PR MOST RECENT SYSTOLIC BLOOD PRESSURE < 130 MM HG: ICD-10-PCS | Mod: CPTII,S$GLB,, | Performed by: OBSTETRICS & GYNECOLOGY

## 2023-11-03 PROCEDURE — 99395 PREV VISIT EST AGE 18-39: CPT | Mod: S$GLB,,, | Performed by: OBSTETRICS & GYNECOLOGY

## 2023-11-03 PROCEDURE — 99395 PR PREVENTIVE VISIT,EST,18-39: ICD-10-PCS | Mod: S$GLB,,, | Performed by: OBSTETRICS & GYNECOLOGY

## 2023-11-03 PROCEDURE — 81025 URINE PREGNANCY TEST: CPT | Mod: S$GLB,,, | Performed by: OBSTETRICS & GYNECOLOGY

## 2023-11-03 PROCEDURE — 99999 PR PBB SHADOW E&M-EST. PATIENT-LVL III: CPT | Mod: PBBFAC,,, | Performed by: OBSTETRICS & GYNECOLOGY

## 2023-11-03 PROCEDURE — 3074F SYST BP LT 130 MM HG: CPT | Mod: CPTII,S$GLB,, | Performed by: OBSTETRICS & GYNECOLOGY

## 2023-11-03 PROCEDURE — 81025 POCT URINE PREGNANCY: ICD-10-PCS | Mod: S$GLB,,, | Performed by: OBSTETRICS & GYNECOLOGY

## 2023-11-03 PROCEDURE — 3008F BODY MASS INDEX DOCD: CPT | Mod: CPTII,S$GLB,, | Performed by: OBSTETRICS & GYNECOLOGY

## 2023-11-03 PROCEDURE — 99999 PR PBB SHADOW E&M-EST. PATIENT-LVL III: ICD-10-PCS | Mod: PBBFAC,,, | Performed by: OBSTETRICS & GYNECOLOGY

## 2023-11-03 PROCEDURE — 1159F PR MEDICATION LIST DOCUMENTED IN MEDICAL RECORD: ICD-10-PCS | Mod: CPTII,S$GLB,, | Performed by: OBSTETRICS & GYNECOLOGY

## 2023-11-03 RX ORDER — TERCONAZOLE 4 MG/G
1 CREAM VAGINAL NIGHTLY
Qty: 7 G | Refills: 0 | Status: SHIPPED | OUTPATIENT
Start: 2023-11-03

## 2023-11-03 NOTE — PROGRESS NOTES
HISTORY OF PRESENT ILLNESS:    Dora Hernandez is a 33 y.o. female, , Patient's last menstrual period was 10/05/2023 (exact date).,  presents for a routine exam and has no complaints. Patient reports cycles occur every 4 weeks lasting 4 days using 4 pads per day.  She denies any BTB. Last cycle with clotting on one day, typically not that heavy. Reports no pain any more with sex, much better.     History of abnormal pap: No  Last Pap:   Last MMG: N/A  Last Colonoscopy: N/A     She uses no birth control.   She does not desire STD screening.    The patient participates in regular exercise: yes.    The patient does not smoke.    The patient wears seatbelts.     Pt denies any domestic violence.    Past Medical History:   Diagnosis Date    Abnormal Pap smear     Anxiety     Depression     Migraine headache     Polycystic disease, ovaries        Past Surgical History:   Procedure Laterality Date    TYMPANOSTOMY         MEDICATIONS AND ALLERGIES:      Current Outpatient Medications:     fluticasone propionate (FLONASE) 50 mcg/actuation nasal spray, SHAKE LIQUID AND USE 1 SPRAY(50 MCG) IN EACH NOSTRIL EVERY DAY, Disp: 48 g, Rfl: 3    terconazole (TERAZOL 7) 0.4 % Crea, Place 1 applicator vaginally every evening., Disp: 7 g, Rfl: 0    Review of patient's allergies indicates:   Allergen Reactions    Metformin Nausea And Vomiting       Family History   Problem Relation Age of Onset    Hypertension Father     Stroke Father     Thyroid disease Mother     Lung cancer Maternal Grandmother     Breast cancer Neg Hx     Colon cancer Neg Hx     Ovarian cancer Neg Hx        Social History     Socioeconomic History    Marital status:    Tobacco Use    Smoking status: Never     Passive exposure: Never    Smokeless tobacco: Never   Substance and Sexual Activity    Alcohol use: Yes    Drug use: No    Sexual activity: Yes     Partners: Male     Birth control/protection: None     Social Determinants of Health      Financial Resource Strain: Medium Risk (4/23/2019)    Overall Financial Resource Strain (CARDIA)     Difficulty of Paying Living Expenses: Somewhat hard   Food Insecurity: Food Insecurity Present (4/23/2019)    Hunger Vital Sign     Worried About Running Out of Food in the Last Year: Sometimes true     Ran Out of Food in the Last Year: Sometimes true   Transportation Needs: No Transportation Needs (4/23/2019)    PRAPARE - Transportation     Lack of Transportation (Medical): No     Lack of Transportation (Non-Medical): No   Physical Activity: Inactive (4/23/2019)    Exercise Vital Sign     Days of Exercise per Week: 0 days     Minutes of Exercise per Session: 0 min   Stress: Stress Concern Present (4/23/2019)    [employer] [place] [employer] - Occupational Stress Questionnaire     Feeling of Stress : To some extent   Social Connections: Unknown (4/23/2019)    Social Connection and Isolation Panel [NHANES]     Frequency of Communication with Friends and Family: More than three times a week     Frequency of Social Gatherings with Friends and Family: More than three times a week     Active Member of Clubs or Organizations: Yes     Attends Club or Organization Meetings: More than 4 times per year     Marital Status:        COMPREHENSIVE GYN HISTORY:  PAP History: Denies abnormal Paps.  Infection History: Denies STDs. Denies PID.  Benign History: Denies uterine fibroids. Denies ovarian cysts. Denies endometriosis. Denies other conditions.  Cancer History: Denies cervical cancer. Denies uterine cancer or hyperplasia. Denies ovarian cancer. Denies vulvar cancer or pre-cancer. Denies vaginal cancer or pre-cancer. Denies breast cancer. Denies colon cancer.  Sexual Activity History: Reports currently being sexually active  Menstrual History: Monthly. Mod then light flow.   Dysmenorrhea History: Reports mild dysmenorrhea.       ROS:  GENERAL: No weight changes. No swelling. No fatigue. No  "fever.  CARDIOVASCULAR: No chest pain. No shortness of breath. No leg cramps.   NEUROLOGICAL: No headaches. No vision changes.  BREASTS: No pain. No lumps. No discharge.  ABDOMEN: No pain. No nausea. No vomiting. No diarrhea. No constipation.  REPRODUCTIVE: No abnormal bleeding.   VULVA: No pain. No lesions. No itching.  VAGINA: No relaxation. No itching. No odor. No discharge. No lesions.  URINARY: No incontinence. No nocturia. No frequency. No dysuria.    /78   Ht 5' 7" (1.702 m)   Wt 110 kg (242 lb 8.1 oz)   LMP 10/05/2023 (Exact Date)   BMI 37.98 kg/m²     PE:  APPEARANCE: Well nourished, well developed, in no acute distress.  AFFECT: WNL, alert and oriented x 3.  SKIN: No acne or hirsutism.  NECK: Neck symmetric, without masses or thyromegaly.  NODES: No inguinal, cervical, axillary or femoral lymph node enlargement.  CHEST: Good respiratory effort.   ABDOMEN: Soft. No tenderness or masses. No hepatosplenomegaly. No hernias.  BREASTS: Symmetrical, no skin changes, visible lesions, palpable masses or nipple discharge bilaterally.  PELVIC: External female genitalia without lesions.  Female hair distribution. Adequate perineal body, Normal urethral meatus. Vagina moist and well rugated without lesions or discharge.  No significant cystocele or rectocele present. Cervix pink without lesions, discharge or tenderness. Uterus is 4-6 week size, regular, mobile and nontender. Adnexa without masses or tenderness.  EXTREMITIES: No edema    DIAGNOSIS:  1. Encounter for gynecological examination without abnormal finding    2. Premenstrual dysphoric disorder    3. BMI 33.0-33.9,adult    4. Yeast vaginitis    5. Pelvic pain      PLAN:    COUNSELING:  The patient was counseled today on:  -A.C.S. Pap and pelvic exam guidelines (pap every 3 years), recomendations for yearly mammogram;  -to follow up with her PCP for other health maintenance.    FOLLOW-UP with me in Feb for VV.   "

## 2024-02-19 ENCOUNTER — OFFICE VISIT (OUTPATIENT)
Dept: OBSTETRICS AND GYNECOLOGY | Facility: CLINIC | Age: 34
End: 2024-02-19
Payer: COMMERCIAL

## 2024-02-19 VITALS — HEIGHT: 67 IN | WEIGHT: 242 LBS | BODY MASS INDEX: 37.98 KG/M2

## 2024-02-19 DIAGNOSIS — F32.81 PREMENSTRUAL DYSPHORIC DISORDER: Primary | ICD-10-CM

## 2024-02-19 PROCEDURE — 1159F MED LIST DOCD IN RCRD: CPT | Mod: CPTII,95,, | Performed by: OBSTETRICS & GYNECOLOGY

## 2024-02-19 PROCEDURE — 99213 OFFICE O/P EST LOW 20 MIN: CPT | Mod: 95,,, | Performed by: OBSTETRICS & GYNECOLOGY

## 2024-02-19 PROCEDURE — 3008F BODY MASS INDEX DOCD: CPT | Mod: CPTII,95,, | Performed by: OBSTETRICS & GYNECOLOGY

## 2024-02-19 PROCEDURE — 1160F RVW MEDS BY RX/DR IN RCRD: CPT | Mod: CPTII,95,, | Performed by: OBSTETRICS & GYNECOLOGY

## 2024-02-19 RX ORDER — PAROXETINE 10 MG/1
10 TABLET, FILM COATED ORAL EVERY MORNING
Qty: 60 TABLET | Refills: 0 | Status: SHIPPED | OUTPATIENT
Start: 2024-02-19 | End: 2024-05-06 | Stop reason: SDUPTHER

## 2024-02-19 NOTE — PROGRESS NOTES
GYN TELEHEALTH VISIT      Patient reports today with complaints of history of pelvic pain        The patient location is: [place], [place] on the side of the road drivinf home- not driving   The chief complaint leading to consultation is: Follow up visit for history of pelvic pain   Visit type: Virtual visit with synchronous audio and video  Total time spent with patient: 20 minutes (Over 50% of time is spent in counseling and coordination of care for history of pelvic pain   COVID-19 precautions discussed in detail, precautions given. Patient urged to visit [email or web address] for latest health updates on COVID-19.   Each patient to whom he or she provides medical services by telemedicine is:  (1) informed of the relationship between the physician and patient and the respective role of any other health care provider with respect to management of the patient; and (2) notified that he or she may decline to receive medical services by telemedicine and may withdraw from such care at any time.    Dora Hernandez is a 34 y.o. female  Patient's last menstrual period was 2024 (exact date). presents today reporting history of pelvic pain, pain with sex and mood changes.   Pain has subsided, no lump present. More bad days than good days, started seeing a therapist - but co-pay too high. No SI or HI.   On meds per PCP - stopped Wellbutrin - no improvement, stopped Prozac     Past Medical History:   Diagnosis Date    Abnormal Pap smear     Anxiety     Depression     Migraine headache     Polycystic disease, ovaries        Past Surgical History:   Procedure Laterality Date    TYMPANOSTOMY         MEDICATIONS AND ALLERGIES:      Current Outpatient Medications:     fluticasone propionate (FLONASE) 50 mcg/actuation nasal spray, SHAKE LIQUID AND USE 1 SPRAY(50 MCG) IN EACH NOSTRIL EVERY DAY, Disp: 48 g, Rfl: 3    paroxetine (PAXIL) 10 MG tablet, Take 1 tablet (10 mg total) by mouth every morning. 10mg Qam daily for 2  "weeks, then increase to 20 mg jaclyn;y qam, Disp: 60 tablet, Rfl: 0    terconazole (TERAZOL 7) 0.4 % Crea, Place 1 applicator vaginally every evening. (Patient not taking: Reported on 2/19/2024), Disp: 7 g, Rfl: 0    Review of patient's allergies indicates:   Allergen Reactions    Metformin Nausea And Vomiting       COMPREHENSIVE GYN HISTORY:  PAP History: Denies abnormal Paps.  Infection History: Denies STDs. Denies PID.  Benign History: Denies uterine fibroids. Denies ovarian cysts. Denies endometriosis. Denies other conditions.  Cancer History: Denies cervical cancer. Denies uterine cancer or hyperplasia. Denies ovarian cancer. Denies vulvar cancer or pre-cancer. Denies vaginal cancer or pre-cancer. Denies breast cancer. Denies colon cancer.  Sexual Activity History: Reports currently being sexually active  Menstrual History: Every 28 days, flows for 4 days. Light flow.  Dysmenorrhea History: Denies dysmenorrhea.    ROS:  GENERAL: No fever or chills.  BREASTS: No pain. No lumps. No discharge.  ABDOMEN: No pain. No nausea. No vomiting. No diarrhea. No constipation.  REPRODUCTIVE: No abnormal bleeding.   VULVA: No pain. No lesions. No itching.  VAGINA: No relaxation. No itching. No odor. No discharge. No lesions.  URINARY: No incontinence. No nocturia. No frequency. No dysuria.    Ht 5' 7" (1.702 m)   Wt 109.8 kg (242 lb)   LMP 02/02/2024 (Exact Date)   BMI 37.90 kg/m²     PE:  APPEARANCE: Well nourished, well developed, in no acute distress.  AFFECT: WNL, alert and oriented x 3.  Deferred    PROCEDURES:    1. Premenstrual dysphoric disorder        PLAN:    COUNSELING:  The patient was counseled today on:     addressing problems separate from annual exam.  This includes face to face time and non-face to face time preparing to see the patient (eg, review of tests), Obtaining and/or reviewing separately obtained history, Documenting clinical information in the electronic or other health record, Independently " interpreting resultsand communicating results to the patient/family/caregiver, or Care coordination.      FOLLOW-UP with therapist within the next 2-3 weeks   Strict precautions given   Paxil 10 mg qd

## 2024-04-07 ENCOUNTER — PATIENT MESSAGE (OUTPATIENT)
Dept: OBSTETRICS AND GYNECOLOGY | Facility: CLINIC | Age: 34
End: 2024-04-07
Payer: COMMERCIAL

## 2024-05-06 RX ORDER — PAROXETINE HYDROCHLORIDE 20 MG/1
20 TABLET, FILM COATED ORAL EVERY MORNING
Qty: 90 TABLET | Refills: 1 | Status: SHIPPED | OUTPATIENT
Start: 2024-05-06

## 2024-05-06 NOTE — TELEPHONE ENCOUNTER
Refill Routing Note   Medication(s) are not appropriate for processing by [facility] for the following reason(s):        New or recently adjusted medication    [facility] action(s):  Defer               Appointments  past 12m or future 3m with PCP    Date Provider   Last Visit   2/19/2024 Radha Short MD   Next Visit   Visit date not found Radha Short MD   ED visits in past 90 days: 0        Note composed:2:48 PM 05/06/2024

## 2024-05-10 ENCOUNTER — OFFICE VISIT (OUTPATIENT)
Dept: ORTHOPEDICS | Facility: CLINIC | Age: 34
End: 2024-05-10
Payer: COMMERCIAL

## 2024-05-10 DIAGNOSIS — M19.071 ARTHRITIS OF RIGHT SUBTALAR JOINT: ICD-10-CM

## 2024-05-10 DIAGNOSIS — Q66.89 TARSAL COALITION OF RIGHT FOOT: Primary | ICD-10-CM

## 2024-05-10 PROCEDURE — 1160F RVW MEDS BY RX/DR IN RCRD: CPT | Mod: CPTII,95,, | Performed by: ORTHOPAEDIC SURGERY

## 2024-05-10 PROCEDURE — 1159F MED LIST DOCD IN RCRD: CPT | Mod: CPTII,95,, | Performed by: ORTHOPAEDIC SURGERY

## 2024-05-10 PROCEDURE — 99212 OFFICE O/P EST SF 10 MIN: CPT | Mod: 95,,, | Performed by: ORTHOPAEDIC SURGERY

## 2024-05-10 NOTE — PROGRESS NOTES
Established Patient - Audio Only Telehealth Visit     The patient location is:  [place]  The chief complaint leading to consultation is:  Discussed surgery  Visit type: Virtual visit with audio only (telephone)  Total time spent with patient:  5 minute       The reason for the audio only service rather than synchronous audio and video virtual visit was related to technical difficulties or patient preference/necessity.     Each patient to whom I provide medical services by telemedicine is:  (1) informed of the relationship between the physician and patient and the respective role of any other health care provider with respect to management of the patient; and (2) notified that they may decline to receive medical services by telemedicine and may withdraw from such care at any time. Patient verbally consented to receive this service via voice-only telephone call.       HPI:  This is a 33-year-old female who has a talocalcaneal tarsal coalition of her right foot with associated subtalar arthritis.  Her last visit with me was on 10/24/2023 at which time she was having continued symptoms and wanted to discuss surgical options.  She works as a  in his on her feet for long periods which was really aggravating her pain.  I had discussed fusion surgery and I injected her subtalar joint with 2 cc lidocaine and 40 mg of triamcinolone.  She schedule this audio visit today to further discuss surgery.  The last injection was not as successful as previous injections in her pain has progressing, so she would like to proceed with surgical intervention.     Assessment and plan:    1. Tarsal coalition of right foot, talocalcaneal joint        2. Arthritis of right subtalar joint          Recommendation:  Right subtalar arthrodesis surgery was discussed including the procedure and the postoperative course as well as risks and benefits.  She would like to proceed with surgery on 06/26/2024.  We will set her up with a  preoperative appointment for H&P and consent                        This service was not originating from a related E/M service provided within the previous 7 days nor will  to an E/M service or procedure within the next 24 hours or my soonest available appointment.  Prevailing standard of care was able to be met in this audio-only visit.

## 2024-05-13 ENCOUNTER — OFFICE VISIT (OUTPATIENT)
Dept: INTERNAL MEDICINE | Facility: CLINIC | Age: 34
End: 2024-05-13
Payer: COMMERCIAL

## 2024-05-13 VITALS
TEMPERATURE: 98 F | BODY MASS INDEX: 39.17 KG/M2 | DIASTOLIC BLOOD PRESSURE: 78 MMHG | HEIGHT: 67 IN | SYSTOLIC BLOOD PRESSURE: 120 MMHG | HEART RATE: 80 BPM | WEIGHT: 249.56 LBS | OXYGEN SATURATION: 97 % | RESPIRATION RATE: 16 BRPM

## 2024-05-13 DIAGNOSIS — Z86.39 HISTORY OF IRON DEFICIENCY: ICD-10-CM

## 2024-05-13 DIAGNOSIS — F32.81 PREMENSTRUAL DYSPHORIC DISORDER: ICD-10-CM

## 2024-05-13 DIAGNOSIS — R73.03 PREDIABETES: ICD-10-CM

## 2024-05-13 DIAGNOSIS — Z00.00 ANNUAL PHYSICAL EXAM: Primary | ICD-10-CM

## 2024-05-13 DIAGNOSIS — E55.9 VITAMIN D INSUFFICIENCY: ICD-10-CM

## 2024-05-13 DIAGNOSIS — J32.9 CHRONIC SINUSITIS, UNSPECIFIED LOCATION: ICD-10-CM

## 2024-05-13 DIAGNOSIS — E66.9 OBESITY (BMI 30-39.9): ICD-10-CM

## 2024-05-13 PROCEDURE — 1160F RVW MEDS BY RX/DR IN RCRD: CPT | Mod: CPTII,S$GLB,, | Performed by: INTERNAL MEDICINE

## 2024-05-13 PROCEDURE — 99999 PR PBB SHADOW E&M-EST. PATIENT-LVL IV: CPT | Mod: PBBFAC,,, | Performed by: INTERNAL MEDICINE

## 2024-05-13 PROCEDURE — 3074F SYST BP LT 130 MM HG: CPT | Mod: CPTII,S$GLB,, | Performed by: INTERNAL MEDICINE

## 2024-05-13 PROCEDURE — 1159F MED LIST DOCD IN RCRD: CPT | Mod: CPTII,S$GLB,, | Performed by: INTERNAL MEDICINE

## 2024-05-13 PROCEDURE — 99395 PREV VISIT EST AGE 18-39: CPT | Mod: S$GLB,,, | Performed by: INTERNAL MEDICINE

## 2024-05-13 PROCEDURE — 3078F DIAST BP <80 MM HG: CPT | Mod: CPTII,S$GLB,, | Performed by: INTERNAL MEDICINE

## 2024-05-13 PROCEDURE — 3008F BODY MASS INDEX DOCD: CPT | Mod: CPTII,S$GLB,, | Performed by: INTERNAL MEDICINE

## 2024-05-13 NOTE — PROGRESS NOTES
Subjective:     PCP: Janna Centeno MD    Dora Hernandez is a 34 y.o. female who presents for an annual exam.    Medical History:   Past Medical History:   Diagnosis Date    Abnormal Pap smear     Anxiety     Depression     Migraine headache     Polycystic disease, ovaries        Family History: family history includes Hypertension in her father; Lung cancer in her maternal grandmother; Stroke in her father; Thyroid disease in her mother.    Surgical History:   Past Surgical History:   Procedure Laterality Date    TYMPANOSTOMY          Social History:  reports that she has never smoked. She has never been exposed to tobacco smoke. She has never used smokeless tobacco. She reports current alcohol use. She reports that she does not use drugs.     Allergies:   Review of patient's allergies indicates:   Allergen Reactions    Metformin Nausea And Vomiting       Medications:   Current Outpatient Medications   Medication Sig    fluticasone propionate (FLONASE) 50 mcg/actuation nasal spray SHAKE LIQUID AND USE 1 SPRAY(50 MCG) IN EACH NOSTRIL EVERY DAY    paroxetine (PAXIL) 20 MG tablet Take 1 tablet (20 mg total) by mouth every morning. (Patient taking differently: Take 20 mg by mouth every evening.)     No current facility-administered medications for this visit.       Health Maintenance:   Health Maintenance Topics with due status: Not Due       Topic Last Completion Date    TETANUS VACCINE 01/28/2020    Cervical Cancer Screening 10/18/2022     Lab Results   Component Value Date    HEPCAB Negative 03/01/2019    EPS21YMUP Negative 02/04/2020     Eye Exam:   not  recently  Dental Exam: in in last year  OB/GYN: No data on file.   Pap smear: 10/21/2022  HIV: 2/2020, neg  Hepatitis C  Ab: 3/2019, neg    Vaccinations:  Immunization History   Administered Date(s) Administered    COVID-19 MRNA, LN-S PF (MODERNA HALF 0.25 ML DOSE) 01/02/2022    COVID-19, MRNA, LN-S, PF (MODERNA FULL 0.5 ML DOSE) 03/10/2021,  04/07/2021    MMR 10/08/1994, 02/26/2020    Meningococcal Conjugate (MCV4P) 01/23/2008    Tdap 01/28/2020     Influenza: not utd  Tetanus: 2020  Covid vaccine:not boosted recently    Body mass index is 39.09 kg/m².  Wt Readings from Last 3 Encounters:   05/13/24 113.2 kg (249 lb 9 oz)   02/19/24 109.8 kg (242 lb)   11/03/23 110 kg (242 lb 8.1 oz)       Review of Systems   Constitutional:  Negative for activity change, chills, diaphoresis, fatigue, fever and unexpected weight change.   HENT:  Positive for sinus pressure (recurrent with frequent infections). Negative for congestion, dental problem, ear discharge, ear pain, hearing loss, postnasal drip, rhinorrhea, sore throat and trouble swallowing.    Eyes:  Negative for discharge, redness and visual disturbance.   Respiratory:  Negative for cough, chest tightness, shortness of breath and wheezing.    Cardiovascular:  Negative for chest pain, palpitations and leg swelling.   Gastrointestinal:  Positive for nausea (likely related to re-starting such a high dose of paxil when she was not taking the lower dose regularly, she will re-start a lower dose and take it every day). Negative for abdominal pain, blood in stool, constipation, diarrhea and vomiting.   Endocrine: Negative for polydipsia and polyuria.   Genitourinary:  Negative for decreased urine volume, difficulty urinating, dysuria, frequency, hematuria and menstrual problem.   Musculoskeletal:  Positive for arthralgias and joint swelling. Negative for back pain, myalgias and neck pain.        Recent ankle injury with mild swelling   Skin:  Negative for rash and wound.   Neurological:  Positive for dizziness (this AM, resolves when she lies down) and headaches. Negative for weakness and numbness.   Hematological:  Negative for adenopathy.   Psychiatric/Behavioral:  Negative for confusion, dysphoric mood and sleep disturbance. The patient is not nervous/anxious.         Answers submitted by the patient for this  visit:  Review of Systems Questionnaire (Submitted on 5/8/2024)  activity change: No  unexpected weight change: No  neck pain: No  hearing loss: No  rhinorrhea: No  trouble swallowing: No  eye discharge: No  visual disturbance: No  chest tightness: No  wheezing: No  chest pain: No  palpitations: No  blood in stool: No  constipation: No  vomiting: No  diarrhea: No  polydipsia: No  polyuria: No  difficulty urinating: No  hematuria: No  menstrual problem: No  dysuria: No  joint swelling: Yes  arthralgias: Yes  headaches: Yes  weakness: No  confusion: No  dysphoric mood: No      Objective:     Physical Exam  Vitals reviewed.   Constitutional:       General: She is awake. She is not in acute distress.     Appearance: Normal appearance. She is well-developed and well-groomed. She is not diaphoretic.   HENT:      Head: Normocephalic and atraumatic.      Right Ear: Hearing, tympanic membrane, ear canal and external ear normal. Tympanic membrane is not erythematous or bulging.      Left Ear: Hearing, tympanic membrane, ear canal and external ear normal. Tympanic membrane is not erythematous or bulging.      Nose: Nose normal. No congestion.      Mouth/Throat:      Mouth: Mucous membranes are moist.      Tongue: No lesions.      Pharynx: Oropharynx is clear. Uvula midline. No oropharyngeal exudate or posterior oropharyngeal erythema.   Eyes:      General: Lids are normal. Vision grossly intact. Gaze aligned appropriately. No scleral icterus.     Conjunctiva/sclera:      Right eye: Right conjunctiva is not injected.      Left eye: Left conjunctiva is not injected.      Pupils: Pupils are equal, round, and reactive to light.   Neck:      Thyroid: No thyroid mass or thyromegaly.   Cardiovascular:      Rate and Rhythm: Normal rate and regular rhythm.      Pulses: Normal pulses.      Heart sounds: Normal heart sounds. No murmur heard.  Pulmonary:      Effort: Pulmonary effort is normal. No respiratory distress.      Breath sounds:  Normal breath sounds. No decreased breath sounds or wheezing.   Abdominal:      General: Bowel sounds are normal. There is no distension.      Palpations: Abdomen is soft. Abdomen is not rigid.      Tenderness: There is no abdominal tenderness. There is no guarding or rebound.   Musculoskeletal:         General: Normal range of motion.      Cervical back: Normal range of motion and neck supple.      Right lower leg: No edema.      Left lower leg: No edema.      Right ankle: No swelling.      Left ankle: Swelling (mild) present.   Lymphadenopathy:      Cervical: No cervical adenopathy.      Upper Body:      Right upper body: No supraclavicular adenopathy.      Left upper body: No supraclavicular adenopathy.   Skin:     General: Skin is warm and dry.      Coloration: Skin is not cyanotic.      Findings: No lesion or rash.      Nails: There is no clubbing.   Neurological:      General: No focal deficit present.      Mental Status: She is alert and oriented to person, place, and time.      Sensory: Sensation is intact.      Coordination: Coordination is intact.      Gait: Gait is intact.      Deep Tendon Reflexes: Reflexes are normal and symmetric.   Psychiatric:         Attention and Perception: Attention normal.         Mood and Affect: Mood normal.         Behavior: Behavior is cooperative.              Assessment:        1. Annual physical exam    2. Prediabetes    3. Chronic sinusitis, unspecified location    4. History of iron deficiency    5. Vitamin D insufficiency    6. Premenstrual dysphoric disorder    7. Obesity (BMI 30-39.9)           Plan:     1. Annual physical exam  - Hemoglobin A1C; Future  - CBC Auto Differential; Future  - Comprehensive Metabolic Panel; Future  - Lipid Panel; Future  - TSH; Future  - Urinalysis; Future    2. Prediabetes  - Hemoglobin A1C; Future    3. Chronic sinusitis, unspecified location  - Ambulatory referral/consult to ENT; Future    4. History of iron deficiency  - Ferritin;  Future  - Iron and TIBC; Future  - will treat if there is a deficiency    5. Vitamin D insufficiency  - Vitamin D; Future  - will treat if there is a deficiency    6. Premenstrual dysphoric disorder  - wait until symptoms resolve and then may resume Paxil 10g daily for 1 month and then increase    7. Obesity (BMI 30-39.9)  - will discuss at a future appointment    RTC in 1 year for annual exam or sooner if needed    __________________________    Janna Centeno MD, PharmD  [facility]  American Board of Obesity Medicine diplomate  Office [phone]

## 2024-05-14 ENCOUNTER — LAB VISIT (OUTPATIENT)
Dept: LAB | Facility: HOSPITAL | Age: 34
End: 2024-05-14
Attending: INTERNAL MEDICINE
Payer: COMMERCIAL

## 2024-05-14 DIAGNOSIS — Z00.00 ANNUAL PHYSICAL EXAM: ICD-10-CM

## 2024-05-14 LAB
BACTERIA #/AREA URNS AUTO: ABNORMAL /HPF
BILIRUB UR QL STRIP: NEGATIVE
CLARITY UR REFRACT.AUTO: ABNORMAL
COLOR UR AUTO: COLORLESS
GLUCOSE UR QL STRIP: NEGATIVE
HGB UR QL STRIP: NEGATIVE
KETONES UR QL STRIP: NEGATIVE
LEUKOCYTE ESTERASE UR QL STRIP: ABNORMAL
MICROSCOPIC COMMENT: ABNORMAL
NITRITE UR QL STRIP: NEGATIVE
PH UR STRIP: 7 [PH] (ref 5–8)
PROT UR QL STRIP: NEGATIVE
RBC #/AREA URNS AUTO: 1 /HPF (ref 0–4)
SP GR UR STRIP: 1 (ref 1–1.03)
SQUAMOUS #/AREA URNS AUTO: 17 /HPF
URN SPEC COLLECT METH UR: ABNORMAL
WBC #/AREA URNS AUTO: 12 /HPF (ref 0–5)

## 2024-05-14 PROCEDURE — 81001 URINALYSIS AUTO W/SCOPE: CPT | Performed by: INTERNAL MEDICINE

## 2024-05-15 ENCOUNTER — PATIENT MESSAGE (OUTPATIENT)
Dept: INTERNAL MEDICINE | Facility: CLINIC | Age: 34
End: 2024-05-15
Payer: COMMERCIAL

## 2024-05-15 DIAGNOSIS — E55.9 VITAMIN D INSUFFICIENCY: Primary | ICD-10-CM

## 2024-05-20 RX ORDER — ERGOCALCIFEROL 1.25 MG/1
50000 CAPSULE ORAL
Qty: 12 CAPSULE | Refills: 1 | Status: SHIPPED | OUTPATIENT
Start: 2024-05-20

## 2024-05-27 ENCOUNTER — OFFICE VISIT (OUTPATIENT)
Dept: ORTHOPEDICS | Facility: CLINIC | Age: 34
End: 2024-05-27
Payer: COMMERCIAL

## 2024-05-27 VITALS — BODY MASS INDEX: 39.17 KG/M2 | WEIGHT: 249.56 LBS | HEIGHT: 67 IN

## 2024-05-27 DIAGNOSIS — Q66.89 TARSAL COALITION OF RIGHT FOOT: Primary | ICD-10-CM

## 2024-05-27 DIAGNOSIS — M19.071 ARTHRITIS OF RIGHT SUBTALAR JOINT: ICD-10-CM

## 2024-05-27 PROCEDURE — 3044F HG A1C LEVEL LT 7.0%: CPT | Mod: CPTII,S$GLB,, | Performed by: ORTHOPAEDIC SURGERY

## 2024-05-27 PROCEDURE — 1159F MED LIST DOCD IN RCRD: CPT | Mod: CPTII,S$GLB,, | Performed by: ORTHOPAEDIC SURGERY

## 2024-05-27 PROCEDURE — 99999 PR PBB SHADOW E&M-EST. PATIENT-LVL III: CPT | Mod: PBBFAC,,, | Performed by: ORTHOPAEDIC SURGERY

## 2024-05-27 PROCEDURE — 3008F BODY MASS INDEX DOCD: CPT | Mod: CPTII,S$GLB,, | Performed by: ORTHOPAEDIC SURGERY

## 2024-05-27 PROCEDURE — 99212 OFFICE O/P EST SF 10 MIN: CPT | Mod: S$GLB,,, | Performed by: ORTHOPAEDIC SURGERY

## 2024-05-27 NOTE — PROGRESS NOTES
Dora Hernandez    Returns today for follow-up.  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  Our last encounter was a telehealth visit on 05/10/2024 to  discuss surgery for subtalar arthrodesis.  A date has been scheduled for 06/06/2024.  She returns today for further questions about her surgery.  She also reports that she has been having some compensatory pain in her left foot.    Examination: She walks in today with a normal gait.  The right ankle reveals no significant swelling this morning.  She has functional motion of the right ankle and decreased motion of the right subtalar joint.  She has full motion of the left ankle and subtalar joint without any significant pain.  She has tender on the right side in the sinus tarsi area.  There is no tenderness on the left side related to the subtalar joint.    Impression:  1. Tarsal coalition of right foot, talocalcaneal joint        2. Arthritis of right subtalar joint            Recommendation:  I again discussed the surgical procedure with her as well as the postoperative course.  I explained to her that she would likely be in a cast for up to four weeks followed by a boot for another 6-8 weeks and that physical therapy would be done sometime after she comes out of the cast.  She will require a walker and/ or a knee scooter.  She will not be able to drive for up to three months with her right foot.    She will follow-up for preoperative H&P consent

## 2024-06-03 ENCOUNTER — HOSPITAL ENCOUNTER (EMERGENCY)
Facility: HOSPITAL | Age: 34
Discharge: HOME | End: 2024-06-04
Attending: EMERGENCY MEDICINE
Payer: COMMERCIAL

## 2024-06-03 DIAGNOSIS — M19.071 ARTHRITIS OF RIGHT SUBTALAR JOINT: Primary | ICD-10-CM

## 2024-06-03 DIAGNOSIS — R55 SYNCOPE: ICD-10-CM

## 2024-06-03 DIAGNOSIS — Q66.89 TARSAL COALITION OF RIGHT FOOT: ICD-10-CM

## 2024-06-03 LAB
ALBUMIN SERPL BCP-MCNC: 3.3 G/DL (ref 3.5–5.2)
ALP SERPL-CCNC: 75 U/L (ref 55–135)
ALT SERPL W/O P-5'-P-CCNC: 15 U/L (ref 10–44)
ANION GAP SERPL CALC-SCNC: 8 MMOL/L (ref 8–16)
AST SERPL-CCNC: 13 U/L (ref 10–40)
B-HCG UR QL: NEGATIVE
BASOPHILS # BLD AUTO: 0.07 K/UL (ref 0–0.2)
BASOPHILS NFR BLD: 0.9 % (ref 0–1.9)
BILIRUB SERPL-MCNC: 0.2 MG/DL (ref 0.1–1)
BUN SERPL-MCNC: 10 MG/DL (ref 6–20)
CALCIUM SERPL-MCNC: 9 MG/DL (ref 8.7–10.5)
CHLORIDE SERPL-SCNC: 107 MMOL/L (ref 95–110)
CO2 SERPL-SCNC: 21 MMOL/L (ref 23–29)
CREAT SERPL-MCNC: 0.8 MG/DL (ref 0.5–1.4)
CTP QC/QA: YES
DIFFERENTIAL METHOD BLD: ABNORMAL
EOSINOPHIL # BLD AUTO: 0.4 K/UL (ref 0–0.5)
EOSINOPHIL NFR BLD: 5.7 % (ref 0–8)
ERYTHROCYTE [DISTWIDTH] IN BLOOD BY AUTOMATED COUNT: 14.6 % (ref 11.5–14.5)
EST. GFR  (NO RACE VARIABLE): >60 ML/MIN/1.73 M^2
GLUCOSE SERPL-MCNC: 87 MG/DL (ref 70–110)
HCT VFR BLD AUTO: 39.3 % (ref 37–48.5)
HCV AB SERPL QL IA: NORMAL
HGB BLD-MCNC: 12.7 G/DL (ref 12–16)
HIV 1+2 AB+HIV1 P24 AG SERPL QL IA: NORMAL
IMM GRANULOCYTES # BLD AUTO: 0.02 K/UL (ref 0–0.04)
IMM GRANULOCYTES NFR BLD AUTO: 0.3 % (ref 0–0.5)
LYMPHOCYTES # BLD AUTO: 2.6 K/UL (ref 1–4.8)
LYMPHOCYTES NFR BLD: 34.6 % (ref 18–48)
MCH RBC QN AUTO: 27.5 PG (ref 27–31)
MCHC RBC AUTO-ENTMCNC: 32.3 G/DL (ref 32–36)
MCV RBC AUTO: 85 FL (ref 82–98)
MONOCYTES # BLD AUTO: 0.5 K/UL (ref 0.3–1)
MONOCYTES NFR BLD: 6.6 % (ref 4–15)
NEUTROPHILS # BLD AUTO: 3.9 K/UL (ref 1.8–7.7)
NEUTROPHILS NFR BLD: 51.9 % (ref 38–73)
NRBC BLD-RTO: 0 /100 WBC
PLATELET # BLD AUTO: 416 K/UL (ref 150–450)
PMV BLD AUTO: 9.3 FL (ref 9.2–12.9)
POCT GLUCOSE: 90 MG/DL (ref 70–110)
POTASSIUM SERPL-SCNC: 3.9 MMOL/L (ref 3.5–5.1)
PROT SERPL-MCNC: 7.7 G/DL (ref 6–8.4)
RBC # BLD AUTO: 4.61 M/UL (ref 4–5.4)
SODIUM SERPL-SCNC: 136 MMOL/L (ref 136–145)
TROPONIN I SERPL DL<=0.01 NG/ML-MCNC: <0.006 NG/ML (ref 0–0.03)
WBC # BLD AUTO: 7.55 K/UL (ref 3.9–12.7)

## 2024-06-03 PROCEDURE — 96374 THER/PROPH/DIAG INJ IV PUSH: CPT

## 2024-06-03 PROCEDURE — 86803 HEPATITIS C AB TEST: CPT | Performed by: PHYSICIAN ASSISTANT

## 2024-06-03 PROCEDURE — 80053 COMPREHEN METABOLIC PANEL: CPT | Performed by: EMERGENCY MEDICINE

## 2024-06-03 PROCEDURE — 25000003 PHARM REV CODE 250: Performed by: EMERGENCY MEDICINE

## 2024-06-03 PROCEDURE — 81025 URINE PREGNANCY TEST: CPT | Performed by: EMERGENCY MEDICINE

## 2024-06-03 PROCEDURE — 63600175 PHARM REV CODE 636 W HCPCS: Performed by: EMERGENCY MEDICINE

## 2024-06-03 PROCEDURE — 99285 EMERGENCY DEPT VISIT HI MDM: CPT | Mod: 25

## 2024-06-03 PROCEDURE — 84484 ASSAY OF TROPONIN QUANT: CPT | Performed by: EMERGENCY MEDICINE

## 2024-06-03 PROCEDURE — 93005 ELECTROCARDIOGRAM TRACING: CPT

## 2024-06-03 PROCEDURE — 85025 COMPLETE CBC W/AUTO DIFF WBC: CPT | Performed by: EMERGENCY MEDICINE

## 2024-06-03 PROCEDURE — 81003 URINALYSIS AUTO W/O SCOPE: CPT | Performed by: EMERGENCY MEDICINE

## 2024-06-03 PROCEDURE — 82962 GLUCOSE BLOOD TEST: CPT

## 2024-06-03 PROCEDURE — 87389 HIV-1 AG W/HIV-1&-2 AB AG IA: CPT | Performed by: PHYSICIAN ASSISTANT

## 2024-06-03 PROCEDURE — 96361 HYDRATE IV INFUSION ADD-ON: CPT

## 2024-06-03 PROCEDURE — 93010 ELECTROCARDIOGRAM REPORT: CPT | Mod: ,,, | Performed by: INTERNAL MEDICINE

## 2024-06-03 RX ORDER — ACETAMINOPHEN 500 MG
1000 TABLET ORAL
Status: COMPLETED | OUTPATIENT
Start: 2024-06-03 | End: 2024-06-03

## 2024-06-03 RX ORDER — CEFAZOLIN SODIUM 2 G/50ML
2 SOLUTION INTRAVENOUS
OUTPATIENT
Start: 2024-06-03

## 2024-06-03 RX ORDER — ONDANSETRON HYDROCHLORIDE 2 MG/ML
4 INJECTION, SOLUTION INTRAVENOUS
Status: COMPLETED | OUTPATIENT
Start: 2024-06-03 | End: 2024-06-03

## 2024-06-03 RX ADMIN — ACETAMINOPHEN 1000 MG: 500 TABLET ORAL at 08:06

## 2024-06-03 RX ADMIN — ONDANSETRON 4 MG: 2 INJECTION INTRAMUSCULAR; INTRAVENOUS at 08:06

## 2024-06-03 RX ADMIN — SODIUM CHLORIDE 1000 ML: 9 INJECTION, SOLUTION INTRAVENOUS at 08:06

## 2024-06-04 VITALS
BODY MASS INDEX: 39.17 KG/M2 | WEIGHT: 249.56 LBS | HEIGHT: 67 IN | RESPIRATION RATE: 16 BRPM | HEART RATE: 74 BPM | OXYGEN SATURATION: 99 % | SYSTOLIC BLOOD PRESSURE: 100 MMHG | TEMPERATURE: 98 F | DIASTOLIC BLOOD PRESSURE: 68 MMHG

## 2024-06-04 LAB
BILIRUB UR QL STRIP: NEGATIVE
CLARITY UR REFRACT.AUTO: CLEAR
COLOR UR AUTO: YELLOW
GLUCOSE UR QL STRIP: NEGATIVE
HGB UR QL STRIP: NEGATIVE
KETONES UR QL STRIP: NEGATIVE
LEUKOCYTE ESTERASE UR QL STRIP: NEGATIVE
NITRITE UR QL STRIP: NEGATIVE
OHS QRS DURATION: 78 MS
OHS QTC CALCULATION: 428 MS
PH UR STRIP: 6 [PH] (ref 5–8)
PROT UR QL STRIP: NEGATIVE
SP GR UR STRIP: 1.02 (ref 1–1.03)
URN SPEC COLLECT METH UR: NORMAL

## 2024-06-04 NOTE — FIRST PROVIDER EVALUATION
"Medical screening examination initiated.  I have conducted a focused provider triage encounter, findings are as follows:    Brief history of present illness:  35 y/o F with hx of anxiety, depression, migraines and polycystic disease presenting with syncope evaluation. Patient reports fainting episode in the car. No fall or head injury. No seizure activity. Felt dizzy and light headed before she passed out. C/O HA. No dysuria, no fevers or chills. Just finished menstrual cycle.     Vitals:    06/03/24 2001   BP: 126/89   Pulse: 74   Resp: 16   Temp: 98.3 °F (36.8 °C)   SpO2: 98%   Weight: 113.2 kg (249 lb 9 oz)   Height: 5' 7" (1.702 m)       Pertinent physical exam:  no deficits, non-toxic    Brief workup plan:  POCT glucose, basic labs, ECG, UA    Preliminary workup initiated; this workup will be continued and followed by the physician or advanced practice provider that is assigned to the patient when roomed.    Bart Pyle DO, FAAEM  Emergency Staff Physician   Dept of Emergency Medicine   [facility]  Spectralink: [id number]        Disclaimer: This note has been generated using voice-recognition software. There may be typographical errors that have been missed during proof-reading.,e  "

## 2024-06-04 NOTE — ED TRIAGE NOTES
"Dora Hernandez, a 34 y.o. female presents to the ED w/ syncopal episode today after getting out of her car, states that she was feeling dizzy and about to pass out. Denies any hx.     Triage note:  Chief Complaint   Patient presents with    Loss of Consciousness     Episode of "passing out after feeling dizzy in the car"     Review of patient's allergies indicates:   Allergen Reactions    Metformin Nausea And Vomiting     Past Medical History:   Diagnosis Date    Abnormal Pap smear     Anxiety     Depression     Migraine headache     Polycystic disease, ovaries        APPEARANCE: awake and alert in NAD.  SKIN: warm, dry and intact. No breakdown or bruising.  MUSCULOSKELETAL: Patient moving all extremities spontaneously, no obvious swelling or deformities noted. Ambulates independently.  RESPIRATORY: Denies shortness of breath.Respirations unlabored.   CARDIAC: Denies CP, 2+ distal pulses; no peripheral edema  ABDOMEN: S/ND/NT, Denies nausea  : voids spontaneously, denies difficulty  Neurologic: AAO x 4; follows commands equal strength in all extremities; denies numbness/tingling. + dizziness.   "

## 2024-06-04 NOTE — ED PROVIDER NOTES
"Encounter Date: 6/3/2024       History     Chief Complaint   Patient presents with    Loss of Consciousness     Episode of "passing out after feeling dizzy in the car"     34-year-old female with a history of anxiety depression migraine headaches polycystic ovary disease presents with syncopal episode.  She states that she was in the car and she started feeling lightheaded.  She states that she gets this type of feeling often.  This time she had a syncopal episode.  She felt like it was hard to get herself together afterwards.  She did not have a preceding headache shortness of breath or palpitations.  This happened about 1/2 hour prior to arrival.  5 minutes before I saw her she was started with a frontal headache.  She states that it feels like a migraine but she does not usually pass out with them.  She was associated nausea.  Still no chest pain shortness of breath.  She has a problem with the ankle in his getting an operation in 2 days.  Denies chance of pregnancy.        Review of patient's allergies indicates:   Allergen Reactions    Metformin Nausea And Vomiting     Past Medical History:   Diagnosis Date    Abnormal Pap smear     Anxiety     Depression     Migraine headache     Polycystic disease, ovaries      Past Surgical History:   Procedure Laterality Date    TYMPANOSTOMY       Family History   Problem Relation Name Age of Onset    Hypertension Father      Stroke Father      Thyroid disease Mother      Lung cancer Maternal Grandmother      Breast cancer Neg Hx      Colon cancer Neg Hx      Ovarian cancer Neg Hx       Social History     Tobacco Use    Smoking status: Never     Passive exposure: Never    Smokeless tobacco: Never   Substance Use Topics    Alcohol use: Yes    Drug use: No     Review of Systems    Physical Exam     Initial Vitals [06/03/24 2001]   BP Pulse Resp Temp SpO2   126/89 74 16 98.3 °F (36.8 °C) 98 %      MAP       --         Physical Exam    Constitutional: She appears well-developed " and well-nourished. She is not diaphoretic. No distress.   HENT:   Head: Normocephalic and atraumatic.   Right Ear: External ear normal.   Left Ear: External ear normal.   Eyes: Conjunctivae and EOM are normal. Pupils are equal, round, and reactive to light.   Neck: Neck supple. No thyromegaly present. No tracheal deviation present.   Normal range of motion.  Cardiovascular:  Normal rate, regular rhythm and normal heart sounds.           No murmur heard.  Pulmonary/Chest: Breath sounds normal. No stridor. No respiratory distress. She has no wheezes. She has no rales.   Abdominal: Abdomen is soft. Bowel sounds are normal. She exhibits no distension. There is no abdominal tenderness. There is no rebound.   Musculoskeletal:         General: No edema. Normal range of motion.      Cervical back: Normal range of motion and neck supple.     Neurological: She is alert and oriented to person, place, and time. She has normal strength. No cranial nerve deficit or sensory deficit. GCS score is 15. GCS eye subscore is 4. GCS verbal subscore is 5. GCS motor subscore is 6.   Psychiatric: She has a normal mood and affect.         ED Course   Procedures  Labs Reviewed   CBC W/ AUTO DIFFERENTIAL - Abnormal; Notable for the following components:       Result Value    RDW 14.6 (*)     All other components within normal limits    Narrative:     Release to patient->Immediate   COMPREHENSIVE METABOLIC PANEL - Abnormal; Notable for the following components:    CO2 21 (*)     Albumin 3.3 (*)     All other components within normal limits    Narrative:     Release to patient->Immediate   URINALYSIS, REFLEX TO URINE CULTURE    Narrative:     Specimen Source->Urine   HIV 1 / 2 ANTIBODY    Narrative:     Release to patient->Immediate   HEPATITIS C ANTIBODY    Narrative:     Release to patient->Immediate   TROPONIN I   POCT URINE PREGNANCY   POCT GLUCOSE     EKG Readings: (Independently Interpreted)   Normal sinus rhythm 78 without acute ischemic  changes     ECG Results              EKG 12-lead (Final result)        Collection Time Result Time QRS Duration OHS QTC Calculation    06/03/24 20:14:27 06/04/24 16:40:28 78 428                     Final result by Interface, Lab In [facility] (06/04/24 16:40:32)                   Narrative:    Test Reason : R55,    Vent. Rate : 078 BPM     Atrial Rate : 078 BPM     P-R Int : 162 ms          QRS Dur : 078 ms      QT Int : 376 ms       P-R-T Axes : 041 026 036 degrees     QTc Int : 428 ms    Normal sinus rhythm  Normal ECG  When compared with ECG of 13-DEC-2022 21:11,  No significant change was found  Confirmed by ARIES SALTER MD ([id number]) on 6/4/2024 4:40:24 PM    Referred By: [email or web address]   SELF           Confirmed By:ARIES SALTER MD                                  Imaging Results              CT Head Without Contrast (Final result)  Result time 06/03/24 22:33:43      Final result by Meet Villegas MD (06/03/24 22:33:43)                   Impression:      No acute intracranial abnormality, specifically no major vascular distribution infarct or acute intracranial hemorrhage.    Note however, that there are some imaging findings that, in some patients, could have a weak association with underlying idiopathic intracranial hypertension.  Correlate clinically.    Electronically signed by resident: Piedad Adams  Date:    06/03/2024  Time:    21:06    Electronically signed by: Meet Villegas  Date:    06/03/2024  Time:    22:33               Narrative:    EXAMINATION:  CT HEAD WITHOUT CONTRAST    CLINICAL HISTORY:  Headache, new or worsening, neuro deficit (Age 19-49y);    TECHNIQUE:  Low dose axial CT images obtained throughout the head without intravenous contrast. Sagittal and coronal reconstructions were performed.    COMPARISON:  No priors.    FINDINGS:  Ventricles and sulci are normal in size for age.  No hydrocephalus.  No extra-axial blood or fluid collections.    No parenchymal abnormalities to suggest  parenchymal mass effect, hemorrhage, edema or major vascular distribution infarct.    Slight like appearance of the 3rd ventricle and portions of the frontal horns of the lateral ventricles.    The cerebellar tonsils protrude into, but not appreciably below, the foramen magnum.    Mild partial empty sella appearance.    In the appropriate clinical context, this constellation of imaging findings may carry a weak association with idiopathic intracranial hypertension.    No displaced calvarial fracture.  Mastoid air cells and paranasal sinuses are essentially clear.                                       Medications   sodium chloride 0.9% bolus 1,000 mL 1,000 mL (0 mLs Intravenous Stopped 6/3/24 2151)   acetaminophen tablet 1,000 mg (1,000 mg Oral Given 6/3/24 2051)   ondansetron injection 4 mg (4 mg Intravenous Given 6/3/24 2051)     Medical Decision Making  Patient with a syncopal episode and now headache.  She was neurologically intact.  EKG reassuring.  Will check labs and head CT.  She does get migraines and this is similar to migraines except for the syncope part    10:01 PM patient's headache is almost resolved and she was feeling better.  Ambulatory in our Internal waiting area    Studies reassuring   Will discharge home    Amount and/or Complexity of Data Reviewed  Radiology: ordered.    Risk  OTC drugs.  Prescription drug management.                                      Clinical Impression:  Final diagnoses:  [R55] Syncope          ED Disposition Condition    Discharge           ED Prescriptions    None       Follow-up Information       Follow up With Specialties Details Why Contact Info    Janna Centeno MD Internal Medicine Schedule an appointment as soon as possible for a visit   [address] [facility] [address]  [phone]      [facility] - Emergency Dept Emergency Medicine  If symptoms worsen [address] [facility] [address] [phone]             Alonso Cat,  MD  06/04/24 5631

## 2024-06-13 ENCOUNTER — OFFICE VISIT (OUTPATIENT)
Dept: ORTHOPEDICS | Facility: CLINIC | Age: 34
End: 2024-06-13
Payer: COMMERCIAL

## 2024-06-13 VITALS — BODY MASS INDEX: 39.93 KG/M2 | WEIGHT: 254.44 LBS | HEIGHT: 67 IN

## 2024-06-13 DIAGNOSIS — M19.071 ARTHRITIS OF RIGHT SUBTALAR JOINT: ICD-10-CM

## 2024-06-13 DIAGNOSIS — Q66.89 TARSAL COALITION OF RIGHT FOOT: Primary | ICD-10-CM

## 2024-06-13 PROCEDURE — 99999 PR PBB SHADOW E&M-EST. PATIENT-LVL III: CPT | Mod: PBBFAC,,, | Performed by: PHYSICIAN ASSISTANT

## 2024-06-13 PROCEDURE — 99499 UNLISTED E&M SERVICE: CPT | Mod: S$GLB,,, | Performed by: PHYSICIAN ASSISTANT

## 2024-06-13 RX ORDER — OXYCODONE HYDROCHLORIDE 5 MG/1
5 TABLET ORAL EVERY 6 HOURS PRN
Qty: 20 TABLET | Refills: 0 | Status: SHIPPED | OUTPATIENT
Start: 2024-06-13

## 2024-06-13 RX ORDER — NAPROXEN 500 MG/1
500 TABLET ORAL 2 TIMES DAILY WITH MEALS
Qty: 28 TABLET | Refills: 0 | Status: SHIPPED | OUTPATIENT
Start: 2024-06-13 | End: 2024-06-27

## 2024-06-13 RX ORDER — GABAPENTIN 300 MG/1
300 CAPSULE ORAL NIGHTLY
Qty: 42 CAPSULE | Refills: 0 | Status: SHIPPED | OUTPATIENT
Start: 2024-06-13 | End: 2024-06-27

## 2024-06-13 RX ORDER — ACETAMINOPHEN 500 MG
1000 TABLET ORAL 3 TIMES DAILY
Qty: 42 TABLET | Refills: 0 | Status: SHIPPED | OUTPATIENT
Start: 2024-06-13 | End: 2024-06-20

## 2024-06-13 NOTE — PROGRESS NOTES
Dora is a 34 y.o. female here today for a pre-operative visit in preparation for a right foot subtalar joint fusion to be performed by Dr. Akhtar on 6/26/2024.  She was last seen and treated in the clinic on 5/27/2024.   There has been no significant change in their past medical or orthopedic status since the previous visit.  No fever, chills, malaise or unexplained weight change.     Allergies, medications, past medical history and past surgical history were reviewed with the patient.     Physical examination was performed.     Current care, treatment plan, precautions, activity level/ modifications, limitations, rehabilitation exercises and proposed future treatment were discussed with the patient. We discussed the need to monitor for changes in symptoms and condition and report them to the physician.    Consents were signed.   Knee scooter ordered- advised to bring day of surgery  Post operative pain medications sent to [facility]  Plan for cast x 4 weeks    -Discussed COVID19 with the patient, they are aware of our current policies and procedures, were given the option of delaying surgery, and they elect to proceed.        Pre, nolberto, and post operative procedure and expectations were discussed.  Questions were answered. The patient has been educated and is ready to proceed with surgery.  Approximately 30 minutes was spent discussing surgical outcomes, plans, procedures, pre, nolberto, and post operative expectations and care. The risks, benefits and alternatives to surgery were discussed with the patient at great length.  These include bleeding, infection, vessel/nerve damage, pain, numbness, tingling, complex regional pain syndrome, hardware/surgical failure, need for further surgery, malunion, nonunion, DVT, PE, arthritis and death. He also understands that the risks of surgery may be greater for some patients due to health or lifestyle issues, such as a current condition or a history of heart disease, obesity,  clotting disorders, recurrent infections, smoking, sedentary lifestyle, or noncompliance with medications, therapy, or followup. The degree of the increased risk is hard to estimate with any degree of precision.  Patient states an understanding and wishes to proceed with surgery.   All questions were answered.  No guarantees were implied or stated.  Informed consent was obtained  The patient will contact us if the have any questions, concerns, and changes in their medical condition prior to surgery.

## 2024-06-13 NOTE — H&P
Subjective:      Patient ID: Dora Hernandez is a 34 y.o. female.    Chief Complaint: Pain and Pre-op Exam of the Right Ankle and Pain and Pre-op Exam of the Right Foot  Dora is a 34 y.o. female here today for a pre-operative visit in preparation for a right foot subtalar joint fusion to be performed by Dr. Akhtar on 6/26/2024.  She was last seen and treated in the clinic on 5/27/2024.   There has been no significant change in their past medical or orthopedic status since the previous visit.  No fever, chills, malaise or unexplained weight change.     Past Medical History:   Diagnosis Date    Abnormal Pap smear     Anxiety     Depression     Migraine headache     Polycystic disease, ovaries      Past Surgical History:   Procedure Laterality Date    TYMPANOSTOMY       Social History     Occupational History    Not on file   Tobacco Use    Smoking status: Never     Passive exposure: Never    Smokeless tobacco: Never   Substance and Sexual Activity    Alcohol use: Yes    Drug use: No    Sexual activity: Yes     Partners: Male     Birth control/protection: None      Review of Systems   Constitutional: Negative for chills and fever.   HENT:  Negative for congestion.    Eyes:  Negative for redness.   Respiratory:  Negative for shortness of breath.    Skin:  Negative for rash.   Gastrointestinal:  Negative for abdominal pain, nausea and vomiting.   Neurological:  Negative for dizziness and seizures.   Psychiatric/Behavioral:  Negative for altered mental status.      Current Outpatient Medications on File Prior to Visit   Medication Sig Dispense Refill    ergocalciferol (ERGOCALCIFEROL) 50,000 unit Cap Take 1 capsule (50,000 Units total) by mouth every 7 days. 12 capsule 1    paroxetine (PAXIL) 20 MG tablet Take 1 tablet (20 mg total) by mouth every morning. (Patient taking differently: Take 20 mg by mouth every evening.) 90 tablet 1    fluticasone propionate (FLONASE) 50 mcg/actuation nasal spray SHAKE LIQUID  "AND USE 1 SPRAY(50 MCG) IN EACH NOSTRIL EVERY DAY 48 g 3     No current facility-administered medications on file prior to visit.     Review of patient's allergies indicates:   Allergen Reactions    Metformin Nausea And Vomiting         Objective:      Vitals:    06/13/24 0941   Weight: 115.4 kg (254 lb 6.6 oz)   Height: 5' 7" (1.702 m)     Ortho Exam     Gen: WD, WN in NAD   HEENT: NC/AT   Heart: RR   Resp: unlabored breathing   Skin: intact, no lesions pertinent to the surgical site   Assessment:       1. Tarsal coalition of right foot, talocalcaneal joint    2. Arthritis of right subtalar joint          Plan:       Surgery per Dr. Akhtar      "

## 2024-06-21 ENCOUNTER — PATIENT MESSAGE (OUTPATIENT)
Dept: PREADMISSION TESTING | Facility: HOSPITAL | Age: 34
End: 2024-06-21
Payer: COMMERCIAL

## 2024-06-21 NOTE — ANESTHESIA PAT ROS NOTE
06/21/2024  Dora Hernandez is a 34 y.o., female.      Pre-op Assessment    I have reviewed the Patient Summary Reports.       I have reviewed the Medications.     Review of Systems  Anesthesia Hx:  No problems with previous Anesthesia   History of prior surgery of interest to airway management or planning:  Previous anesthesia: Epidural    2/24/2020  IUD, epidural for delivery with epidural.  Procedure performed at an [facility].  Successful Spinal/Epidural level at L 3-4        Denies Personal Hx of Anesthesia complications.                    Social:  Non-Smoker, Social Alcohol Use       Hematology/Oncology:    Oncology Normal    -- Anemia:               Hematology Comments: History of iron deficiency,    Vitamin D insufficiency                         EENT/Dental:   H/O Tympanostomy,  Chronic sinusitis          Cardiovascular:  Cardiovascular Normal Exercise tolerance: good        Denies Dysrhythmias.   Denies Angina.       Denies ALBARRAN.  ECG has been reviewed.                          Pulmonary:  Pulmonary Normal    Denies Asthma.   Denies Shortness of breath.                  Renal/:  Renal/ Normal  Denies Chronic Renal Disease.                Hepatic/GI:  Hepatic/GI Normal     Denies GERD. Denies Liver Disease.            Musculoskeletal:  Arthritis   Arthritis of right subtalar joint,  Tarsal coalition of right foot, talocalcaneal joint              OB/GYN/PEDS:  Polycystic Ovarian Disorder, H/O abnormal Pap smear           Neurological:      Headaches Denies Seizures.    H/O migraine headaches,  H/O dizziness at times, had syncopal episode X1, seen in ED, w/u negative, responded to IVF's                            Endocrine:   Denies Hypothyroidism.  Prediabetes, HA1C = 5.7 on 5/14/2024      Obesity / BMI > 30  Psych:   anxiety depression Premenstrual dysphoric disorder               Past Medical History:   Diagnosis Date    Abnormal Pap smear     Anxiety     Depression     Migraine headache     Polycystic disease, ovaries      Past Surgical History:   Procedure Laterality Date    TYMPANOSTOMY         Anesthesia Assessment: Preoperative EQUATION    Planned Procedure: Procedure(s) (LRB):  FUSION, SUBTALAR JOINT (Right)  Requested Anesthesia Type:Choice  Surgeon: Surya Akhtar MD  Service: Orthopedics  Known or anticipated Date of Surgery:6/26/2024    Surgeon notes: reviewed    Electronic QUestionnaire Assessment completed via nurse interview with patient.        Triage considerations:     The patient has no apparent active cardiac condition (No unstable coronary Syndrome such as severe unstable angina or recent [<1 month] myocardial infarction, decompensated CHF, severe valvular   disease or significant arrhythmia)    Previous anesthesia records:Epidural Anesthesia and No problems    Last PCP note: within 3 months , within [facility]   Subspecialty notes: n/a    Other important co-morbidities: Obesity       EKG 6/3/2024:  Vent. Rate : 078 BPM     Atrial Rate : 078 BPM      P-R Int : 162 ms          QRS Dur : 078 ms       QT Int : 376 ms       P-R-T Axes : 041 026 036 degrees      QTc Int : 428 ms   Normal sinus rhythm   Normal ECG   When compared with ECG of 13-DEC-2022 21:11,   No significant change was found   Confirmed by GAETANO PENA, ARIES ([id number]) on 6/4/2024 4:40:24 PM        Tests already available:  Results have been reviewed.             Instructions given. (See in Nurse's note) Preop medication instructions sent via portal message.     Optimization:  Anesthesia Preop Clinic Assessment Not Indicated    Medical Opinion Indicated: No       Sub-specialist consult indicated:No      Plan:  Consultation: Medical clearance is not requested.              Patient  has previously scheduled Medical Appointment:    Navigation:  Straight Line to surgery.               No tests, anesthesia preop  clinic visit, or consult required.                        Patient is OK to proceed with surgery at [facility].       Ht: 5'7  Wt: 115.4 kg (254 lb)  BMI: 39.85  Vaccinated

## 2024-06-25 ENCOUNTER — TELEPHONE (OUTPATIENT)
Dept: ORTHOPEDICS | Facility: CLINIC | Age: 34
End: 2024-06-25
Payer: COMMERCIAL

## 2024-06-25 ENCOUNTER — ANESTHESIA EVENT (OUTPATIENT)
Dept: SURGERY | Facility: HOSPITAL | Age: 34
End: 2024-06-25
Payer: COMMERCIAL

## 2024-06-25 NOTE — TELEPHONE ENCOUNTER
I spoke with pt,confirmed surgery arrival time of 5am. [facility] A,nothing to eat or drink after midnight. Patient verbalized understanding.

## 2024-06-26 ENCOUNTER — DOCUMENTATION ONLY (OUTPATIENT)
Dept: ORTHOPEDICS | Facility: CLINIC | Age: 34
End: 2024-06-26
Payer: COMMERCIAL

## 2024-06-26 ENCOUNTER — HOSPITAL ENCOUNTER (OUTPATIENT)
Facility: HOSPITAL | Age: 34
Discharge: HOME | End: 2024-06-26
Attending: ORTHOPAEDIC SURGERY | Admitting: ORTHOPAEDIC SURGERY
Payer: COMMERCIAL

## 2024-06-26 ENCOUNTER — ANESTHESIA (OUTPATIENT)
Dept: SURGERY | Facility: HOSPITAL | Age: 34
End: 2024-06-26
Payer: COMMERCIAL

## 2024-06-26 DIAGNOSIS — M19.071 ARTHRITIS OF RIGHT SUBTALAR JOINT: ICD-10-CM

## 2024-06-26 DIAGNOSIS — Q66.89 TARSAL COALITION OF RIGHT FOOT: Primary | ICD-10-CM

## 2024-06-26 LAB
B-HCG UR QL: NEGATIVE
CTP QC/QA: YES

## 2024-06-26 PROCEDURE — 27201423 OPTIME MED/SURG SUP & DEVICES STERILE SUPPLY: Performed by: ORTHOPAEDIC SURGERY

## 2024-06-26 PROCEDURE — 81025 URINE PREGNANCY TEST: CPT | Performed by: ANESTHESIOLOGY

## 2024-06-26 PROCEDURE — C1769 GUIDE WIRE: HCPCS | Performed by: ORTHOPAEDIC SURGERY

## 2024-06-26 PROCEDURE — 36000708 HC OR TIME LEV III 1ST 15 MIN: Performed by: ORTHOPAEDIC SURGERY

## 2024-06-26 PROCEDURE — 94761 N-INVAS EAR/PLS OXIMETRY MLT: CPT

## 2024-06-26 PROCEDURE — 25000003 PHARM REV CODE 250: Performed by: NURSE ANESTHETIST, CERTIFIED REGISTERED

## 2024-06-26 PROCEDURE — 64445 NJX AA&/STRD SCIATIC NRV IMG: CPT | Performed by: ANESTHESIOLOGY

## 2024-06-26 PROCEDURE — 71000015 HC POSTOP RECOV 1ST HR: Performed by: ORTHOPAEDIC SURGERY

## 2024-06-26 PROCEDURE — 71000033 HC RECOVERY, INTIAL HOUR: Performed by: ORTHOPAEDIC SURGERY

## 2024-06-26 PROCEDURE — 63600175 PHARM REV CODE 636 W HCPCS: Performed by: NURSE ANESTHETIST, CERTIFIED REGISTERED

## 2024-06-26 PROCEDURE — 37000008 HC ANESTHESIA 1ST 15 MINUTES: Performed by: ORTHOPAEDIC SURGERY

## 2024-06-26 PROCEDURE — C1713 ANCHOR/SCREW BN/BN,TIS/BN: HCPCS | Performed by: ORTHOPAEDIC SURGERY

## 2024-06-26 PROCEDURE — 27800903 OPTIME MED/SURG SUP & DEVICES OTHER IMPLANTS: Performed by: ORTHOPAEDIC SURGERY

## 2024-06-26 PROCEDURE — 25000003 PHARM REV CODE 250: Performed by: NURSE PRACTITIONER

## 2024-06-26 PROCEDURE — 64447 NJX AA&/STRD FEMORAL NRV IMG: CPT | Performed by: ANESTHESIOLOGY

## 2024-06-26 PROCEDURE — 36000709 HC OR TIME LEV III EA ADD 15 MIN: Performed by: ORTHOPAEDIC SURGERY

## 2024-06-26 PROCEDURE — 63600175 PHARM REV CODE 636 W HCPCS: Performed by: ORTHOPAEDIC SURGERY

## 2024-06-26 PROCEDURE — 63600175 PHARM REV CODE 636 W HCPCS: Performed by: ANESTHESIOLOGY

## 2024-06-26 PROCEDURE — 25000003 PHARM REV CODE 250: Performed by: ORTHOPAEDIC SURGERY

## 2024-06-26 PROCEDURE — 28725 ARTHRODESIS SUBTALAR: CPT | Mod: RT,,, | Performed by: ORTHOPAEDIC SURGERY

## 2024-06-26 PROCEDURE — 37000009 HC ANESTHESIA EA ADD 15 MINS: Performed by: ORTHOPAEDIC SURGERY

## 2024-06-26 PROCEDURE — 99900035 HC TECH TIME PER 15 MIN (STAT)

## 2024-06-26 DEVICE — IMPLANTABLE DEVICE: Type: IMPLANTABLE DEVICE | Site: FOOT | Status: FUNCTIONAL

## 2024-06-26 DEVICE — GRAFT PRIME DBM HD BONE 2.5CC: Type: IMPLANTABLE DEVICE | Site: FOOT | Status: FUNCTIONAL

## 2024-06-26 RX ORDER — ROPIVACAINE HYDROCHLORIDE 5 MG/ML
INJECTION, SOLUTION EPIDURAL; INFILTRATION; PERINEURAL
Status: COMPLETED | OUTPATIENT
Start: 2024-06-26 | End: 2024-06-26

## 2024-06-26 RX ORDER — HALOPERIDOL 5 MG/ML
0.5 INJECTION INTRAMUSCULAR EVERY 10 MIN PRN
Status: DISCONTINUED | OUTPATIENT
Start: 2024-06-26 | End: 2024-06-26 | Stop reason: HOSPADM

## 2024-06-26 RX ORDER — MUPIROCIN 20 MG/G
OINTMENT TOPICAL 2 TIMES DAILY
Status: DISCONTINUED | OUTPATIENT
Start: 2024-06-26 | End: 2024-06-26 | Stop reason: HOSPADM

## 2024-06-26 RX ORDER — FENTANYL CITRATE 50 UG/ML
25 INJECTION, SOLUTION INTRAMUSCULAR; INTRAVENOUS EVERY 5 MIN PRN
Status: DISCONTINUED | OUTPATIENT
Start: 2024-06-26 | End: 2024-06-26 | Stop reason: HOSPADM

## 2024-06-26 RX ORDER — ACETAMINOPHEN 500 MG
1000 TABLET ORAL
Status: COMPLETED | OUTPATIENT
Start: 2024-06-26 | End: 2024-06-26

## 2024-06-26 RX ORDER — MIDAZOLAM HYDROCHLORIDE 1 MG/ML
INJECTION, SOLUTION INTRAMUSCULAR; INTRAVENOUS
Status: DISCONTINUED
Start: 2024-06-26 | End: 2024-06-26 | Stop reason: HOSPADM

## 2024-06-26 RX ORDER — ONDANSETRON 4 MG/1
8 TABLET, ORALLY DISINTEGRATING ORAL EVERY 8 HOURS PRN
Status: DISCONTINUED | OUTPATIENT
Start: 2024-06-26 | End: 2024-06-26 | Stop reason: HOSPADM

## 2024-06-26 RX ORDER — DEXAMETHASONE SODIUM PHOSPHATE 4 MG/ML
INJECTION, SOLUTION INTRA-ARTICULAR; INTRALESIONAL; INTRAMUSCULAR; INTRAVENOUS; SOFT TISSUE
Status: DISCONTINUED | OUTPATIENT
Start: 2024-06-26 | End: 2024-06-26

## 2024-06-26 RX ORDER — HYDROCODONE BITARTRATE AND ACETAMINOPHEN 5; 325 MG/1; MG/1
1 TABLET ORAL EVERY 4 HOURS PRN
Status: DISCONTINUED | OUTPATIENT
Start: 2024-06-26 | End: 2024-06-26 | Stop reason: HOSPADM

## 2024-06-26 RX ORDER — CELECOXIB 200 MG/1
400 CAPSULE ORAL
Status: COMPLETED | OUTPATIENT
Start: 2024-06-26 | End: 2024-06-26

## 2024-06-26 RX ORDER — FAMOTIDINE 10 MG/ML
INJECTION INTRAVENOUS
Status: DISCONTINUED | OUTPATIENT
Start: 2024-06-26 | End: 2024-06-26

## 2024-06-26 RX ORDER — PROPOFOL 10 MG/ML
VIAL (ML) INTRAVENOUS CONTINUOUS PRN
Status: DISCONTINUED | OUTPATIENT
Start: 2024-06-26 | End: 2024-06-26

## 2024-06-26 RX ORDER — PROPOFOL 10 MG/ML
VIAL (ML) INTRAVENOUS
Status: DISCONTINUED | OUTPATIENT
Start: 2024-06-26 | End: 2024-06-26

## 2024-06-26 RX ORDER — SODIUM CHLORIDE 0.9 % (FLUSH) 0.9 %
10 SYRINGE (ML) INJECTION
Status: DISCONTINUED | OUTPATIENT
Start: 2024-06-26 | End: 2024-06-26 | Stop reason: HOSPADM

## 2024-06-26 RX ORDER — FENTANYL CITRATE 50 UG/ML
25 INJECTION, SOLUTION INTRAMUSCULAR; INTRAVENOUS
Status: DISCONTINUED | OUTPATIENT
Start: 2024-06-26 | End: 2024-06-26 | Stop reason: HOSPADM

## 2024-06-26 RX ORDER — DIPHENHYDRAMINE HYDROCHLORIDE 50 MG/ML
25 INJECTION INTRAMUSCULAR; INTRAVENOUS EVERY 6 HOURS PRN
Status: DISCONTINUED | OUTPATIENT
Start: 2024-06-26 | End: 2024-06-26 | Stop reason: HOSPADM

## 2024-06-26 RX ORDER — LIDOCAINE HYDROCHLORIDE 20 MG/ML
INJECTION INTRAVENOUS
Status: DISCONTINUED | OUTPATIENT
Start: 2024-06-26 | End: 2024-06-26

## 2024-06-26 RX ORDER — MIDAZOLAM HYDROCHLORIDE 1 MG/ML
0.5 INJECTION, SOLUTION INTRAMUSCULAR; INTRAVENOUS
Status: DISCONTINUED | OUTPATIENT
Start: 2024-06-26 | End: 2024-06-26 | Stop reason: HOSPADM

## 2024-06-26 RX ORDER — ONDANSETRON HYDROCHLORIDE 2 MG/ML
INJECTION, SOLUTION INTRAVENOUS
Status: DISCONTINUED | OUTPATIENT
Start: 2024-06-26 | End: 2024-06-26

## 2024-06-26 RX ORDER — FENTANYL CITRATE 50 UG/ML
INJECTION, SOLUTION INTRAMUSCULAR; INTRAVENOUS
Status: DISCONTINUED
Start: 2024-06-26 | End: 2024-06-26 | Stop reason: HOSPADM

## 2024-06-26 RX ADMIN — CEFAZOLIN 3 G: 2 INJECTION, POWDER, FOR SOLUTION INTRAMUSCULAR; INTRAVENOUS at 07:06

## 2024-06-26 RX ADMIN — DEXAMETHASONE SODIUM PHOSPHATE 8 MG: 4 INJECTION, SOLUTION INTRAMUSCULAR; INTRAVENOUS at 07:06

## 2024-06-26 RX ADMIN — ONDANSETRON 4 MG: 2 INJECTION INTRAMUSCULAR; INTRAVENOUS at 09:06

## 2024-06-26 RX ADMIN — ACETAMINOPHEN 1000 MG: 500 TABLET ORAL at 05:06

## 2024-06-26 RX ADMIN — LIDOCAINE HYDROCHLORIDE 100 MG: 20 INJECTION INTRAVENOUS at 07:06

## 2024-06-26 RX ADMIN — CELECOXIB 400 MG: 200 CAPSULE ORAL at 05:06

## 2024-06-26 RX ADMIN — SODIUM CHLORIDE: 0.9 INJECTION, SOLUTION INTRAVENOUS at 06:06

## 2024-06-26 RX ADMIN — PROPOFOL 150 MCG/KG/MIN: 10 INJECTION, EMULSION INTRAVENOUS at 07:06

## 2024-06-26 RX ADMIN — PROPOFOL 50 MG: 10 INJECTION, EMULSION INTRAVENOUS at 07:06

## 2024-06-26 RX ADMIN — ROPIVACAINE HYDROCHLORIDE 15 ML: 5 INJECTION, SOLUTION EPIDURAL; INFILTRATION; PERINEURAL at 06:06

## 2024-06-26 RX ADMIN — ROPIVACAINE HYDROCHLORIDE 30 ML: 5 INJECTION EPIDURAL; INFILTRATION; PERINEURAL at 06:06

## 2024-06-26 RX ADMIN — FENTANYL CITRATE 100 MCG: 50 INJECTION INTRAMUSCULAR; INTRAVENOUS at 06:06

## 2024-06-26 RX ADMIN — MIDAZOLAM 2 MG: 1 INJECTION INTRAMUSCULAR; INTRAVENOUS at 06:06

## 2024-06-26 RX ADMIN — FAMOTIDINE 20 MG: 10 INJECTION, SOLUTION INTRAVENOUS at 07:06

## 2024-06-26 RX ADMIN — SODIUM CHLORIDE, SODIUM GLUCONATE, SODIUM ACETATE, POTASSIUM CHLORIDE, MAGNESIUM CHLORIDE, SODIUM PHOSPHATE, DIBASIC, AND POTASSIUM PHOSPHATE: .53; .5; .37; .037; .03; .012; .00082 INJECTION, SOLUTION INTRAVENOUS at 08:06

## 2024-06-26 NOTE — TRANSFER OF CARE
"Anesthesia Transfer of Care Note    Patient: Dora Hernandez    Procedure(s) Performed: Procedure(s) (LRB):  FUSION, SUBTALAR JOINT (Right)    Patient location: PACU    Anesthesia Type: general    Transport from OR: Transported from OR on 6-10 L/min O2 by face mask with adequate spontaneous ventilation    Post pain: adequate analgesia    Post assessment: no apparent anesthetic complications and tolerated procedure well    Post vital signs: stable    Level of consciousness: awake, alert and oriented    Nausea/Vomiting: no nausea/vomiting    Complications: none    Transfer of care protocol was followed      Last vitals: Visit Vitals  BP (!) 98/50 (BP Location: Right arm, Patient Position: Lying)   Pulse 85   Temp 36.7 °C (98 °F)   Resp 16   Ht 5' 7" (1.702 m)   Wt 115.2 kg (254 lb)   LMP 05/28/2024 (Exact Date)   SpO2 100%   Breastfeeding No   BMI 39.78 kg/m²     "

## 2024-06-26 NOTE — ANESTHESIA PREPROCEDURE EVALUATION
06/26/2024  Dora Hernandez is a 34 y.o., female.      Pre-op Assessment    I have reviewed the Patient Summary Reports.     I have reviewed the Nursing Notes. I have reviewed the NPO Status.   I have reviewed the Medications.     Review of Systems  Anesthesia Hx:  No problems with previous Anesthesia   History of prior surgery of interest to airway management or planning:  Previous anesthesia: General        Denies Family Hx of Anesthesia complications.    Denies Personal Hx of Anesthesia complications.                    Social:  Non-Smoker       Hematology/Oncology:  Hematology Normal   Oncology Normal                                   EENT/Dental:  EENT/Dental Normal           Cardiovascular:  Cardiovascular Normal Exercise tolerance: good                                           Pulmonary:  Pulmonary Normal                       Renal/:  Renal/ Normal                 Hepatic/GI:  Hepatic/GI Normal                 Musculoskeletal:  Musculoskeletal Normal                OB/GYN/PEDS:  PCOS           Neurological:      Headaches                                 Endocrine:  Endocrine Normal          Obesity / BMI > 30  Dermatological:  Skin Normal    Psych:  Psychiatric History anxiety depression                Physical Exam  General: Well nourished, Cooperative, Alert and Oriented    Airway:  Mallampati: II / II  Mouth Opening: Normal  TM Distance: Normal  Tongue: Normal  Neck ROM: Normal ROM    Dental:  Intact    Chest/Lungs:  Clear to auscultation, Normal Respiratory Rate    Heart:  Rate: Normal  Rhythm: Regular Rhythm  Sounds: Normal    Abdomen:  Normal, Soft, Nontender        Anesthesia Plan  Type of Anesthesia, risks & benefits discussed:    Anesthesia Type: Gen ETT, Gen Supraglottic Airway  Intra-op Monitoring Plan: Standard ASA Monitors  Post Op Pain Control Plan: multimodal analgesia and  peripheral nerve block  Induction:  IV  Airway Plan: Direct, Post-Induction  Informed Consent: Informed consent signed with the Patient and all parties understand the risks and agree with anesthesia plan.  All questions answered.   ASA Score: 2    Ready For Surgery From Anesthesia Perspective.     .

## 2024-06-26 NOTE — ANESTHESIA PROCEDURE NOTES
Right popliteal single shot    Patient location during procedure: pre-op   Block not for primary anesthetic.  Reason for block: at surgeon's request and post-op pain management   Post-op Pain Location: Right foot pain   Start time: 6/26/2024 6:30 AM  Timeout: 6/26/2024 6:30 AM   End time: 6/26/2024 6:45 AM    Staffing  Authorizing Provider: Humberto Terry MD  Performing Provider: Humberto Terry MD    Staffing  Performed by: Humberto Terry MD  Authorized by: Humberto Terry MD    Preanesthetic Checklist  Completed: patient identified, IV checked, site marked, risks and benefits discussed, surgical consent, monitors and equipment checked, pre-op evaluation and timeout performed  Peripheral Block  Patient position: supine  Prep: ChloraPrep  Patient monitoring: heart rate, cardiac monitor, continuous pulse ox, continuous capnometry and frequent blood pressure checks  Block type: popliteal  Laterality: right  Injection technique: single shot  Needle  Needle type: Stimuplex   Needle gauge: 21 G  Needle length: 4 in  Needle localization: anatomical landmarks and ultrasound guidance   -ultrasound image captured on disc.  Assessment  Injection assessment: negative aspiration, negative parasthesia and local visualized surrounding nerve  Paresthesia pain: none  Heart rate change: no  Slow fractionated injection: yes  Pain Tolerance: comfortable throughout block and no complaints  Medications:    Medications: ropivacaine (NAROPIN) injection 0.5% - Perineural   30 mL - 6/26/2024 6:35:00 AM    Additional Notes  VSS.  [facility] RN monitoring vitals throughout procedure.  Patient tolerated procedure well.

## 2024-06-26 NOTE — OP NOTE
Operative report  Date of surgery:  06/26/2024     Preop diagnosis:  Right talocalcaneal coalition with subtalar arthritis     Postop diagnosis: Same     Procedure:  Right foot subtalar arthrodesis     Anesthesia: Regional block     Surgeon: Dr. Akhtar   Assistant: Dr. Edge    Complications: None   Estimated blood loss: None   Specimens: None     Implants:  Arthrex 7.0 mm compression screws x2    Procedure in detail:   After regional anesthesia was administered in the preop holding area, the patient was brought to the operating room and placed on the operating table in a supine position with a bump under the right hip.  Patient's right leg was prepped and draped in a sterile fashion.  A sterile tourniquet was placed around the right calf and the right leg was exsanguinated in the was elevated to 250 mmHg.  An incision was made centered over the sinus tarsi area of the right hindfoot beginning around the tip of the fibula and extending towards the base of the 4th metatarsal.  The incision was carried through the skin and subcutaneous tissue and sinus tarsi fat pad down to the fascia over the extensor digitorum brevis muscle.  The fascia of the extensor digitorum brevis muscle was incised longitudinally and the muscle was elevated superiorly and inferiorly off of the anterior portion of the calcaneus exposed in the sinus tarsi area.  Using sharp dissection and a rongeur were the soft tissues removed from the sinus tarsi in the tarsal canal.  The subtalar joint was very tight and difficult to distract.  I used rongeur to remove some distal osteophytes off of the plantar aspect of the talus in then used a large lamina  to distract the joint enough to decorticate.  Using a combination of osteotomes, curette, rongeur, and a reciprocating rasp the posterior facet of the calcaneus in the talus were decorticated.  The coalition was mostly fibrous in nature and I removed all of the fibrous tissue from the middle  and anterior facets.  Reciprocating rasp was used to decorticate these articulations.  Once the decortication was completed the joint was well irrigated removing all of the loose debris.  A 2.0 mm drill bit was used to fenestrate the fusion surfaces.  Cancellous bone chips and demineralized bone matrix were then packed into the fusion site.  To guidewires for the Arthrex 7.0 mm compression screws were placed through the posterior aspect of the calcaneus across the fusion site into the talus.  The the placement of the guidewires was performed with the assistance of FluoroScan imaging.  Once the guidewires in place drill holes were made with a 5.0mm drill bit and two 7.0 mm Arthrex compression screws were used to fix the arthrodesis site.  Solid fixation was obtained.  Once fixation was completed the wounds were gently irrigated.  The subcutaneous tissue in the fascia over the extensor digitorum brevis muscle were closed with 2-0 Vicryl suture.  The skin incision was closed with 3-0 nylon suture.  Two incisions in the back of the heel for the screw placement were closed with 3-0 nylon suture.  A sterile compressive dressing and a well-padded short-leg cast was placed in the operating room and the patient was returned to postop holding area in stable condition.

## 2024-06-26 NOTE — PLAN OF CARE
Pre-op complete. Waiting on site lou, H&P update, and anesthesia consent. Family at bedside. Bed locked in lowest position with call bell within reach.   Patient needs knee scooter.

## 2024-06-26 NOTE — BRIEF OP NOTE
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    DISPOSITION: Home or Self Care    FINAL DIAGNOSIS:  <principal problem not specified>    FOLLOWUP: In clinic    DISCHARGE INSTRUCTIONS:    Discharge Procedure Orders   CRUTCHES FOR HOME USE     Order Specific Question Answer Comments   Type: Axillary    Height: 5' 7" (1.702 m)    Weight: 115.2 kg (254 lb)    Length of need (1-99 months): 2      Diet general     Call MD for:  temperature >100.4     Call MD for:  persistent nausea and vomiting     Call MD for:  severe uncontrolled pain     Call MD for:  difficulty breathing, headache or visual disturbances     Call MD for:  redness, tenderness, or signs of infection (pain, swelling, redness, odor or green/yellow discharge around incision site)     Call MD for:  hives     Call MD for:  persistent dizziness or light-headedness     Call MD for:  extreme fatigue     Leave dressing on - Keep it clean, dry, and intact until clinic visit     Non weight bearing       "

## 2024-06-26 NOTE — ANESTHESIA PROCEDURE NOTES
Right adductor single shot    Patient location during procedure: pre-op   Block not for primary anesthetic.  Reason for block: at surgeon's request and post-op pain management   Post-op Pain Location: Right foot pain   Start time: 6/26/2024 6:30 AM  Timeout: 6/26/2024 6:30 AM   End time: 6/26/2024 6:45 AM    Staffing  Authorizing Provider: Hmuberto Terry MD  Performing Provider: Humberto Terry MD    Staffing  Performed by: Humberto Terry MD  Authorized by: Humberto Terry MD    Preanesthetic Checklist  Completed: patient identified, IV checked, site marked, risks and benefits discussed, surgical consent, monitors and equipment checked, pre-op evaluation and timeout performed  Peripheral Block  Patient position: supine  Prep: ChloraPrep  Patient monitoring: heart rate, cardiac monitor, continuous pulse ox, continuous capnometry and frequent blood pressure checks  Block type: adductor canal  Laterality: right  Injection technique: single shot  Needle  Needle type: Stimuplex   Needle gauge: 21 G  Needle length: 4 in  Needle localization: anatomical landmarks and ultrasound guidance   -ultrasound image captured on disc.  Assessment  Injection assessment: negative aspiration, negative parasthesia and local visualized surrounding nerve  Paresthesia pain: none  Heart rate change: no  Slow fractionated injection: yes  Pain Tolerance: comfortable throughout block and no complaints  Medications:    Medications: ropivacaine (NAROPIN) injection 0.5% - Perineural   15 mL - 6/26/2024 6:45:00 AM    Additional Notes  VSS.  [facility] RN monitoring vitals throughout procedure.  Patient tolerated procedure well.

## 2024-06-27 VITALS
RESPIRATION RATE: 24 BRPM | SYSTOLIC BLOOD PRESSURE: 98 MMHG | BODY MASS INDEX: 39.87 KG/M2 | HEIGHT: 67 IN | OXYGEN SATURATION: 98 % | WEIGHT: 254 LBS | HEART RATE: 76 BPM | TEMPERATURE: 98 F | DIASTOLIC BLOOD PRESSURE: 61 MMHG

## 2024-06-27 NOTE — ANESTHESIA POSTPROCEDURE EVALUATION
Anesthesia Post Evaluation    Patient: Dora Hernandez    Procedure(s) Performed: Procedure(s) (LRB):  FUSION, SUBTALAR JOINT (Right)    Final Anesthesia Type: general      Patient location during evaluation: PACU  Patient participation: Yes- Able to Participate  Level of consciousness: awake and alert and oriented  Post-procedure vital signs: reviewed and stable  Pain management: adequate  Airway patency: patent    PONV status at discharge: No PONV  Anesthetic complications: no      Cardiovascular status: hemodynamically stable  Respiratory status: unassisted, spontaneous ventilation and room air  Hydration status: euvolemic  Follow-up not needed.              Vitals Value Taken Time   BP 98/61 06/26/24 1017   Temp 36.8 °C (98.2 °F) 06/26/24 1015   Pulse 74 06/26/24 1022   Resp 25 06/26/24 1019   SpO2 99 % 06/26/24 1022   Vitals shown include unfiled device data.      Event Time   Out of Recovery 10:10:00         Pain/Rondey Score: Pain Rating Prior to Med Admin: 3 (6/26/2024  6:40 AM)  Rodney Score: 10 (6/26/2024 10:15 AM)

## 2024-07-11 ENCOUNTER — OFFICE VISIT (OUTPATIENT)
Dept: ORTHOPEDICS | Facility: CLINIC | Age: 34
End: 2024-07-11
Payer: COMMERCIAL

## 2024-07-11 DIAGNOSIS — Q66.89 TARSAL COALITION OF RIGHT FOOT: Primary | ICD-10-CM

## 2024-07-11 DIAGNOSIS — M19.071 ARTHRITIS OF RIGHT SUBTALAR JOINT: ICD-10-CM

## 2024-07-11 PROCEDURE — 99999 PR PBB SHADOW E&M-EST. PATIENT-LVL III: CPT | Mod: PBBFAC,,, | Performed by: PHYSICIAN ASSISTANT

## 2024-07-11 NOTE — PROGRESS NOTES
Postoperative Visit  DOS 6/26/2024    History of Present Illness:   Dora Hernandez is s/p right foot subtalar arthrodesis  who comes to the office for 2 weeks post op evaluation. Her pain is well controlled and the patient is not taking narcotic pain medications. The patient is wearing the cast  in a compliant fashion.  She has been compliant with weight bearing restrictions and elevation instructions.  She denies fevers or chills.   She is  having some intermittent paresthesias in her lower leg but this seems to be getting better.      Physical Examination:  She is alert, awake and oriented to time, place and person. She does not appear to be in any distress, and denies any constitutional symptoms. Examination of the right foot demonstrates healing incision with no erythema or drainage.  There is mild appropriate post op swelling.    Assessment/Plan:  2 weeks s/p right foot subtalar arthrodesis  Sutures were removed today  Continue SLC  No refills needed  Follow up in 2 weeks with Dr. Akhtar    All questions were answered in detail. The patient is in full agreement with the treatment plan and will proceed accordingly.

## 2024-07-16 ENCOUNTER — PATIENT MESSAGE (OUTPATIENT)
Dept: ORTHOPEDICS | Facility: CLINIC | Age: 34
End: 2024-07-16
Payer: COMMERCIAL

## 2024-07-25 ENCOUNTER — OFFICE VISIT (OUTPATIENT)
Dept: ORTHOPEDICS | Facility: CLINIC | Age: 34
End: 2024-07-25
Payer: COMMERCIAL

## 2024-07-25 VITALS — BODY MASS INDEX: 39.87 KG/M2 | WEIGHT: 254 LBS | HEIGHT: 67 IN

## 2024-07-25 DIAGNOSIS — Z09 FOLLOW-UP EXAMINATION AFTER ORTHOPEDIC SURGERY: Primary | ICD-10-CM

## 2024-07-25 DIAGNOSIS — Z98.1 ARTHRODESIS STATUS: ICD-10-CM

## 2024-07-25 PROCEDURE — 1159F MED LIST DOCD IN RCRD: CPT | Mod: CPTII,S$GLB,, | Performed by: ORTHOPAEDIC SURGERY

## 2024-07-25 PROCEDURE — 99999 PR PBB SHADOW E&M-EST. PATIENT-LVL III: CPT | Mod: PBBFAC,,, | Performed by: ORTHOPAEDIC SURGERY

## 2024-07-25 PROCEDURE — 3044F HG A1C LEVEL LT 7.0%: CPT | Mod: CPTII,S$GLB,, | Performed by: ORTHOPAEDIC SURGERY

## 2024-07-25 PROCEDURE — 99024 POSTOP FOLLOW-UP VISIT: CPT | Mod: S$GLB,,, | Performed by: ORTHOPAEDIC SURGERY

## 2024-07-25 NOTE — PROGRESS NOTES
Dora Hernandez  Returns today for follow-up.  She is now four weeks postop from her right foot subtalar arthrodesis.  She has been in a cast since her last visit.  She returns today and reports that she is doing well and her pain is well controlled.    Examination:  Her cast was removed today and the right lower extremity reveals minimal swelling.  The incision is well healed on her right foot.  She has active but decreased motion of the ankle due to cast immobilization.    Impression:  1. Follow-up examination after orthopedic surgery        2. Right subtalar arthrodesis          Recommendation:  She is progressing as expected.  I am going to transition her to a fracture boot at this time.  She can bear weight within limits of pain in the boot.  I encouraged her to do active ankle motion exercises and I dispensed to her a Thera-Band to do some resistance exercises with her ankle.    Follow-up in four weeks with x-ray right foot

## 2024-08-22 ENCOUNTER — OFFICE VISIT (OUTPATIENT)
Dept: ORTHOPEDICS | Facility: CLINIC | Age: 34
End: 2024-08-22
Payer: COMMERCIAL

## 2024-08-22 ENCOUNTER — HOSPITAL ENCOUNTER (OUTPATIENT)
Dept: RADIOLOGY | Facility: HOSPITAL | Age: 34
Discharge: HOME | End: 2024-08-22
Attending: ORTHOPAEDIC SURGERY
Payer: COMMERCIAL

## 2024-08-22 DIAGNOSIS — Z98.1 ARTHRODESIS STATUS: ICD-10-CM

## 2024-08-22 DIAGNOSIS — Z09 FOLLOW-UP EXAMINATION AFTER ORTHOPEDIC SURGERY: Primary | ICD-10-CM

## 2024-08-22 DIAGNOSIS — Z09 FOLLOW-UP EXAMINATION AFTER ORTHOPEDIC SURGERY: ICD-10-CM

## 2024-08-22 PROCEDURE — 73630 X-RAY EXAM OF FOOT: CPT | Mod: 26,RT,, | Performed by: RADIOLOGY

## 2024-08-22 PROCEDURE — 99999 PR PBB SHADOW E&M-EST. PATIENT-LVL III: CPT | Mod: PBBFAC,,, | Performed by: ORTHOPAEDIC SURGERY

## 2024-08-22 PROCEDURE — 1159F MED LIST DOCD IN RCRD: CPT | Mod: CPTII,S$GLB,, | Performed by: ORTHOPAEDIC SURGERY

## 2024-08-22 PROCEDURE — 99024 POSTOP FOLLOW-UP VISIT: CPT | Mod: S$GLB,,, | Performed by: ORTHOPAEDIC SURGERY

## 2024-08-22 PROCEDURE — 73630 X-RAY EXAM OF FOOT: CPT | Mod: TC,RT

## 2024-08-22 PROCEDURE — 3044F HG A1C LEVEL LT 7.0%: CPT | Mod: CPTII,S$GLB,, | Performed by: ORTHOPAEDIC SURGERY

## 2024-08-22 RX ORDER — IBUPROFEN 800 MG/1
800 TABLET ORAL 2 TIMES DAILY PRN
Qty: 60 TABLET | Refills: 2 | Status: SHIPPED | OUTPATIENT
Start: 2024-08-22

## 2024-08-22 RX ORDER — METHYLPREDNISOLONE 4 MG/1
TABLET ORAL
Qty: 1 EACH | Refills: 0 | Status: SHIPPED | OUTPATIENT
Start: 2024-08-22

## 2024-08-22 NOTE — PROGRESS NOTES
Dora Hernandez  Returns today for follow-up.  She is now about eight weeks postop from her right subtalar arthrodesis for tarsal coalition.  She returns today and reports that she has been walking in the boot despite pain.  She reports that her pain is flared up over the last few days.    Examination:  The right foot reveals minimal swelling this morning.  Her wound is well healed.  She has functional motion of the ankle.  She has some mild tenderness over the surgical site.    Imaging: I ordered and reviewed an x-ray of the right foot today.  Screws are in place without any sign of loosening.      Impression:   1. Follow-up examination after orthopedic surgery  X-Ray Foot Complete Right    methylPREDNISolone (MEDROL, SIVA,) 4 mg tablet      2. Right subtalar arthrodesis  X-Ray Foot Complete Right    methylPREDNISolone (MEDROL, SIVA,) 4 mg tablet            Recommendation:  I reassured her that she is progressing as expected.  I do not expect her not to have any pain at this point.  I recommended that if she is having increased pain during the course of the day that she continue to use the knee scooter if necessary.  I am going to prescribe a Medrol pack for her recent flare-up.

## 2024-09-06 ENCOUNTER — PATIENT MESSAGE (OUTPATIENT)
Dept: INTERNAL MEDICINE | Facility: CLINIC | Age: 34
End: 2024-09-06
Payer: COMMERCIAL

## 2024-09-06 ENCOUNTER — E-VISIT (OUTPATIENT)
Dept: INTERNAL MEDICINE | Facility: CLINIC | Age: 34
End: 2024-09-06
Payer: COMMERCIAL

## 2024-09-06 DIAGNOSIS — E66.9 OBESITY (BMI 30-39.9): Primary | ICD-10-CM

## 2024-09-06 PROCEDURE — 99421 OL DIG E/M SVC 5-10 MIN: CPT | Mod: ,,, | Performed by: INTERNAL MEDICINE

## 2024-09-06 NOTE — PROGRESS NOTES
Patient ID: Dora Hernandez is a 34 y.o. female.    Chief Complaint: General Illness (Entered automatically based on patient selection in [employer].)    The patient initiated a request through [employer] on 9/6/2024 for evaluation and management with a chief complaint of General Illness (Entered automatically based on patient selection in [employer].)     I evaluated the questionnaire submission on 09/06/2024.    [facility] Evisit [facility]-Medication    9/6/2024 10:29 AM CDT - Filed by Patient   What do you need help with? Other Concern   Do you agree to participate in an E-Visit? Yes   If you have any of the following symptoms, please present to your local emergency room or call 911:  I acknowledge   Are you pregnant, could you be pregnant, or are you breast feeding? None of the above   What is the main issue you would like addressed today? GLP-1 Medication   Please describe your symptoms Insulin resistance   Where is your problem located? Whole body   How severe are your symptoms? Severe   Have you had these symptoms before? Yes   How long have you been having these symptoms? For more than a month   Please list any medications or treatments you have used for your condition and indicate if it was effective or not. Metformin   What makes this feel better? I felt better when I stopped taking it   What makes this feel worse? Taking it   Are these symptoms related to a condition that you currently have? Yes   What is the condition? prediabetic   When were you last seen for this condition?    Please describe any probable cause for these symptoms hormonal issues, weight gain, fatigue, headaches   Provide any additional information you feel is important. n/a   Please attach any relevant images or files    Are you able to take your vital signs? No         Encounter Diagnosis   Name Primary?    Obesity (BMI 30-39.9) Yes        No orders of the defined types were placed in this encounter.       Emailed patient to review  options. Advised her to check coverage of obesity medications with insurance due to cost. She will inform us when she finds out.    No follow-ups on file.      E-Visit Time Trackin minutes

## 2024-10-03 ENCOUNTER — OFFICE VISIT (OUTPATIENT)
Dept: ORTHOPEDICS | Facility: CLINIC | Age: 34
End: 2024-10-03
Payer: COMMERCIAL

## 2024-10-03 ENCOUNTER — HOSPITAL ENCOUNTER (OUTPATIENT)
Dept: RADIOLOGY | Facility: HOSPITAL | Age: 34
Discharge: HOME | End: 2024-10-03
Attending: ORTHOPAEDIC SURGERY
Payer: COMMERCIAL

## 2024-10-03 VITALS — BODY MASS INDEX: 39.87 KG/M2 | HEIGHT: 67 IN | WEIGHT: 254 LBS

## 2024-10-03 DIAGNOSIS — Q66.89 TARSAL COALITION OF RIGHT FOOT: ICD-10-CM

## 2024-10-03 DIAGNOSIS — Z09 FOLLOW-UP EXAMINATION AFTER ORTHOPEDIC SURGERY: Primary | ICD-10-CM

## 2024-10-03 DIAGNOSIS — Z98.1 ARTHRODESIS STATUS: ICD-10-CM

## 2024-10-03 DIAGNOSIS — Z09 FOLLOW-UP EXAMINATION AFTER ORTHOPEDIC SURGERY: ICD-10-CM

## 2024-10-03 PROCEDURE — 3044F HG A1C LEVEL LT 7.0%: CPT | Mod: CPTII,S$GLB,, | Performed by: ORTHOPAEDIC SURGERY

## 2024-10-03 PROCEDURE — 99999 PR PBB SHADOW E&M-EST. PATIENT-LVL III: CPT | Mod: PBBFAC,,, | Performed by: ORTHOPAEDIC SURGERY

## 2024-10-03 PROCEDURE — 73630 X-RAY EXAM OF FOOT: CPT | Mod: TC,RT

## 2024-10-03 PROCEDURE — 1159F MED LIST DOCD IN RCRD: CPT | Mod: CPTII,S$GLB,, | Performed by: ORTHOPAEDIC SURGERY

## 2024-10-03 PROCEDURE — 99024 POSTOP FOLLOW-UP VISIT: CPT | Mod: S$GLB,,, | Performed by: ORTHOPAEDIC SURGERY

## 2024-10-03 NOTE — PROGRESS NOTES
Dora Hernandez  Returns today for follow-up.  She is now 14 weeks postop from her right subtalar arthrodesis for tarsal coalition.  Her last visit was on 08/22/2024 at which time she was having pain with weight-bearing in her boot.  I reminded her that time that I would still expect pain and that she should try to avoid pain with weight-bearing as much as possible.  She returns today and reports that she is doing better but is starting to have other issues related to the boot.    Examination:  The right foot reveals no swelling this morning.  Her incision is well healed.  There is no tenderness about her hindfoot.  She has functional motion of her ankle.  She has some mild generalized weakness due to immobilization.    Imaging: I ordered reviewed right foot x-ray today     Impression:   1. Follow-up examination after orthopedic surgery  X-Ray Foot Complete Right    Ambulatory referral/consult to Physical/Occupational Therapy      2. Arthrodesis status  Ambulatory referral/consult to Physical/Occupational Therapy      3. Tarsal coalition of right foot  Ambulatory referral/consult to Physical/Occupational Therapy            Recommendation:  She can come out of the boot at this time into shoes.  She can progress her low-impact activities as tolerated.  I am going to refer her to physical therapy for general conditioning.      Follow-up in six weeks if necessary

## 2024-10-11 ENCOUNTER — CLINICAL SUPPORT (OUTPATIENT)
Dept: REHABILITATION | Facility: HOSPITAL | Age: 34
End: 2024-10-11
Attending: ORTHOPAEDIC SURGERY
Payer: COMMERCIAL

## 2024-10-11 DIAGNOSIS — R26.9 GAIT ABNORMALITY: Primary | ICD-10-CM

## 2024-10-11 DIAGNOSIS — Z09 FOLLOW-UP EXAMINATION AFTER ORTHOPEDIC SURGERY: ICD-10-CM

## 2024-10-11 DIAGNOSIS — M25.671 DECREASED RANGE OF MOTION OF RIGHT ANKLE: ICD-10-CM

## 2024-10-11 DIAGNOSIS — Z98.1 ARTHRODESIS STATUS: ICD-10-CM

## 2024-10-11 DIAGNOSIS — R29.898 DECREASED STRENGTH OF LOWER EXTREMITY: ICD-10-CM

## 2024-10-11 DIAGNOSIS — Q66.89 TARSAL COALITION OF RIGHT FOOT: ICD-10-CM

## 2024-10-11 PROCEDURE — 97161 PT EVAL LOW COMPLEX 20 MIN: CPT

## 2024-10-11 PROCEDURE — 97110 THERAPEUTIC EXERCISES: CPT

## 2024-10-14 NOTE — PLAN OF CARE
[facility]   Physical Therapy Initial Evaluation      Name: Dora Hernandez  Clinic Number: [id number]    Therapy Diagnosis:   Encounter Diagnoses   Name Primary?    Follow-up examination after orthopedic surgery     Arthrodesis status     Tarsal coalition of right foot     Gait abnormality Yes    Decreased range of motion of right ankle     Decreased strength of lower extremity      Physician: Surya Akhtar MD     Physician Orders: PT Eval and Treat   Medical Diagnosis from Referral:  Follow-up examination after orthopedic surgery [Z09], Arthrodesis status [Z98.1], Tarsal coalition of right foot [Q66.89]   Evaluation Date: 10/11/2024  Authorization Period Expiration: 10/3/2025  Plan of Care Expiration: 2/10/2025  Progress Note Due: 11/15/2024  Visit # / Visits authorized: 1/1   FOTO: 1/3    Date of Surgery: 6/26/2024  Return to MD: 11/14/2024    Procedure: Right foot subtalar arthrodesis     Precautions: Standard and Fall     Time In: 8:00 am   Time Out: 8:55 am   Total Appointment Time (timed & untimed codes): 55 minutes    Subjective     Date of onset: Ankle surgery in June     History of current condition - Dora reports: She underwent an ankle surgery on June 26th of 2024. Since surgery she was in a cast and then a boot and last week was just released from the boot to weight bear as tolerated. She feels like her ankle has to get warmed up any time she goes to move. It is painful to walk and to the point she will avoid doing certain activities so she does not have to walk. When she was using the boot she did not have pain in her foot but her left hip started bothering her with the boot and the boot was heavy. She reports that currently her pain is 4/10 and when walking it is 7-8/10 and by the next day after increased walking is a 10/10. She describes the pain as sometimes tingling and just a pain but not shooting or throbbing. Mainly right where her incisions are and  "notices she still does not have full sensation on the inside and outside of her lower leg. She reports she works two jobs one is  mainly sitting and then she is a  requiring her to be on her feet a lot and this is the busy part of her season. She reports her ankle pain prior to surgery had been going on for awhile and the pain is better since surgery as she is not having significant swelling and burning pain.     Falls: None reported     Imaging:   X-Ray Ankle (10/3/2024):  FINDINGS:  Postoperative changes of calcaneal talar fusion, the hardware is intact.  The alignment of the osseous structures is normal.  No acute fracture, no osseous lesions, no advanced degenerative change.  The soft tissues appear normal.    Prior Therapy: No, not for this   Social History: Lives with family   Occupation:  during the week and then on weekends is a  requiring increased walking.   Prior Level of Function: Moderate pain affecting ADLs but independent in all ADLs and job duties.  Current Level of Function: Moderate to severe pain, difficulty with ambulation, ADLs, and job duties.     Pain:  Current 4/10, worst 10/10, best 4/10   Location: right ankle   Description: Variable and tingling, not shooting pain and not throbbing, "pain"  Aggravating Factors: Standing, Walking, and Getting out of bed/chair  Easing Factors: ice and rest    Patients goals: To be able to relieve the pain and walk without pain. If possible would like to be able to jog as well.      Medical History:   Past Medical History:   Diagnosis Date    Abnormal Pap smear     Anxiety     Depression     Migraine headache     Polycystic disease, ovaries        Surgical History:   Dora Hernandez  has a past surgical history that includes Tympanostomy and Fusion of subtalar joint (Right, 6/26/2024).    Medications:   Dora has a current medication list which includes the following prescription(s): " "ergocalciferol, ibuprofen, and paroxetine.    Allergies:   Review of patient's allergies indicates:   Allergen Reactions    Metformin Nausea And Vomiting        Objective      Observation: Patient presents into clinic with an overall pleasant general affect who does not appear to be in any acute distress.     Gait: Patient ambulates into clinic with no assistance or assistive device with decreased lucy, decreased step length bilaterally with significant limitation in stance time on right lower extremity, increased hip external rotation and abduction on right decreased hip/knee flexion during swing and antalgic gait.    Sensation: Intact to light touch left lower extremity in dermatomal pattern L2-S2 with decreased right lower extremity sensation to light touch at lateral and medial malleolus along lateral shin starting just below knee.     Range of Motion:  Hip   Right  Left    Flexion 100 degrees  110 degrees    Extension NT degrees  NT degrees    Internal Rotation 25 degrees  30 degrees    External Rotation 40 degrees  50 degrees      Knee    Right  Left    Flexion 120 degrees   130 degrees    Extension +3  degrees (hyper) +3 degrees (hyper)     Ankle   Right AROM/PROM Left AROM/PROM   DF (knee extended) -5 degrees* / -2 degrees* (lack) 0 degrees / 3 degrees    Plantarflexion 20 degrees / 25 degrees  50 degrees / 50 degrees    Inversion 10 degrees / 12 degrees  20 degrees / 24 degrees    Eversion 2 degrees* / 4 degrees*  10 degrees / 12 degrees     *denotes pain  **Pt reported "tingling" with eversion and dorsiflexion assessment on right lower extremity     Lower Extremity Strength (MMT):   Right Left   Hip Flexion 4-/5 4/5   Hip Abduction 4-/5 4-/5   Hip Extension 4-/5 4-/5   Knee Flexion 4/5 4+/5   Knee Extension 4/5 5/5   Dorsiflexion 3-/5* 4/5   Plantarflexion 3/5* 4/5   Inversion 3/5* 4/5   Eversion 3-/5* 4/5   FHL 3/5 3+/5   FD 3-/5 3+/5   *denotes pain     Joint Mobility: Limited talocrural and subtalar " "joint mobility on right as expected with post-op status, decreased great toe joint mobility into flexion and slight extension.     Palpation: No notable tenderness to palpation, some increased discomfort with joint mobility assessment of talocrural joint.     Special Tests:  - SLR: (-) on L, (+) for adverse neural tension on R with DF and eversion     Functional Tests: Not fully assessed secondary to time constraints, to be assessed at follow-up session when appropriate.   Double leg squat:   Single leg squat:   SLS EO:   SLS EC:     Intake Outcome Measure for FOTO Foot Survey    Therapist reviewed FOTO scores for Dora Hernandez on 10/11/2024.   FOTO documents entered into [employer] - see Media section.    Intake Score: 42%          Treatment     Total Treatment time (time-based codes) separate from Evaluation: 20 minutes     Dora received the treatments listed below:      therapeutic exercises to develop strength, endurance, ROM, flexibility, posture, and core stabilization for 16 minutes including:    Pt education on PT POC, Prognosis, and HEP  Seated DF heel slides 1 x 10 x 3-5" holds   Seated AAROM heel raises 1 x 5 reps   Ankle PF/DF AROM 1 x 10 reps   Demonstration of towel crunches   Sidelying clamshells RedTB 1 x 10 reps     manual therapy techniques: Joint mobilizations and Soft tissue Mobilization were applied to the: Ankle for 04 minutes, including:    Ankle range of motion PF/DF   Gentle open pack talocrural AP mobilizations     neuromuscular re-education activities to improve: Balance, Coordination, Kinesthetic, Sense, Proprioception, and Posture for 00 minutes. The following activities were included:      therapeutic activities to improve functional performance for 00 minutes, including:      gait training to improve functional mobility and safety for 00 minutes, including:      Patient Education and Home Exercises     Education provided:   - PT POC, Prognosis, and HEP   - Proper elevation above " her heart, post-op goals and precautions     Written Home Exercises Provided: yes. Exercises were reviewed and Dora was able to demonstrate them prior to the end of the session.  Dora demonstrated good  understanding of the education provided.     Assessment     Dora is a 34 y.o. female referred to outpatient Physical Therapy with a medical diagnosis of Follow-up examination after orthopedic surgery [Z09], Arthrodesis status [Z98.1], Tarsal coalition of right foot [Q66.89]. Patient presents with signs and symptoms consistent with post-op diagnosis with decreased range of motion, decreased strength, gait abnormality, pain, decreased balance/proprioception, edema, and functional limitations in ADLs, job duties, prolonged ambulation, standing, and getting up and down. Patient would benefit from skilled PT intervention to address above stated deficits and improve overall functional mobility.    Patient prognosis is Good.   Patient will benefit from skilled outpatient Physical Therapy to address the deficits stated above and in the chart below, provide patient /family education, and to maximize patientt's level of independence.     Plan of care discussed with patient: Yes  Patient's spiritual, cultural and educational needs considered and patient is agreeable to the plan of care and goals as stated below:     Anticipated Barriers for therapy: Scheduling, chronicity of symptoms, and complexity of surgery    Medical Necessity is demonstrated by the following  History  Co-morbidities and personal factors that may impact the plan of care [] LOW: no personal factors / co-morbidities  [x] MODERATE: 1-2 personal factors / co-morbidities  [] HIGH: 3+ personal factors / co-morbidities    Moderate / High Support Documentation: BMI, depression, prediabetes     Examination  Body Structures and Functions, activity limitations and participation restrictions that may impact the plan of care [] LOW: addressing 1-2 elements  []  MODERATE: 3+ elements  [x] HIGH: 3+ elements (please support below)    Moderate / High Support Documentation: range of motion, strength, posture, gait, motor control, neuromuscular re-education, balance, proprioception      Clinical Presentation [x] LOW: stable  [x] MODERATE: Evolving  [] HIGH: Unstable     Decision Making/ Complexity Score: low       Goals:  Short Term Goals: 6 weeks   Patient will be independent in initial HEP to help supplement PT.  Patient will improve ankle dorsiflexion range of motion to 0 degrees to help with gait mechanics.   Patient will report pain at its worst to </= 5/10 to help improve quality of life.  Patient will report minimal discomfort (</= 3/10) with ambulation to help with ADLs.     Long Term Goals: 12-16 weeks   Patient will be independent in updated HEP to help supplement PT and maintain gains made in PT.  Patient will improve FOTO score to >/= predicted to show improvement in condition.   Patient will report no pain with ambulation to help with ADLs.   Patient will improve ankle DF strength to >/= 4/5 to help with gait mechanics.   Plan     Plan of care Certification: 10/11/2024 to 2/10/2025.    Outpatient Physical Therapy 1-2 times weekly for 16 weeks to include the following interventions: Electrical Stimulation , Gait Training, Manual Therapy, Moist Heat/ Ice, Neuromuscular Re-ed, Patient Education, Self Care, Therapeutic Activities, and Therapeutic Exercise.     Moira Dewitt, PT, DPT, OCS   Co-treated by: Mehdi Love, SPT

## 2024-10-18 ENCOUNTER — CLINICAL SUPPORT (OUTPATIENT)
Dept: REHABILITATION | Facility: HOSPITAL | Age: 34
End: 2024-10-18
Payer: COMMERCIAL

## 2024-10-18 DIAGNOSIS — M25.671 DECREASED RANGE OF MOTION OF RIGHT ANKLE: ICD-10-CM

## 2024-10-18 DIAGNOSIS — R29.898 DECREASED STRENGTH OF LOWER EXTREMITY: ICD-10-CM

## 2024-10-18 DIAGNOSIS — R26.9 GAIT ABNORMALITY: Primary | ICD-10-CM

## 2024-10-18 PROCEDURE — 97110 THERAPEUTIC EXERCISES: CPT

## 2024-10-18 PROCEDURE — 97140 MANUAL THERAPY 1/> REGIONS: CPT

## 2024-10-18 PROCEDURE — 97112 NEUROMUSCULAR REEDUCATION: CPT

## 2024-10-20 NOTE — PROGRESS NOTES
[facility]   Physical Therapy Treatment Note     Name: Dora Hernandez  Clinic Number: [id number]    Therapy Diagnosis:   Encounter Diagnoses   Name Primary?    Gait abnormality Yes    Decreased range of motion of right ankle     Decreased strength of lower extremity      Physician: Surya Akhtar MD    Visit Date: 10/18/2024  Physician Orders: PT Eval and Treat   Medical Diagnosis from Referral:  Follow-up examination after orthopedic surgery [Z09], Arthrodesis status [Z98.1], Tarsal coalition of right foot [Q66.89]   Evaluation Date: 10/11/2024  Authorization Period Expiration: 12/31/2024  Plan of Care Expiration: 2/10/2025  Progress Note Due: 11/15/2024  Visit # / Visits authorized: 1/20   FOTO: 1/3    PTA Visit #: 0/5     FOTO first follow up:   FOTO second follow up:     Date of Surgery: 6/26/2024  Return to MD: 11/14/2024     Procedure: Right foot subtalar arthrodesis      Precautions: Standard and Fall     Time In: 8:00 am   Time Out: 9:00 am   Total Billable Time: 60 minutes    SUBJECTIVE     Pt reports: She is doing better today, she feels she is walking a little better but still having that pain in her foot.  She was compliant with home exercise program.  Response to previous treatment: Independent in HEP   Functional change: Ongoing     Pain: 3/10  Location: right ankle     OBJECTIVE     Objective Measures updated at progress report unless specified.     Observation 10/18/2024:  Range of Motion:  Ankle    Right A/P Left A/P   Dorsiflexion -5 degrees / -1 degrees (lack) 0 degrees / 2 degrees    Plantarflexion 30 degrees / 35 degrees  45 degrees / 50 degrees    Inversion 10 degrees* / 12 degrees* 18 degrees / 23 degrees    Eversion  2 degrees / 6 degrees  12 degrees / 16 degrees    *denotes pain     Treatment     Dora received the treatments listed below:      therapeutic exercises to develop strength, endurance, ROM, flexibility, posture, and core stabilization  "for 36 minutes including:    Objective assessment as above   Pt education   Longsitting Gastroc stretch with strap 5 x 20" holds   Standing DF self mobilizations with OrangePowerband 1 x 20 x 3" holds   Sidelying hip AB 3 x 8-10 reps   Ankle DF with YellowTB 3 x 10 reps   Ankle PF with GreenTB 3 x 10 reps     Not Performed:  Seated DF heel slides 1 x 10 x 3-5" holds   Seated AAROM heel raises 1 x 5 reps     manual therapy techniques: Joint mobilizations and Soft tissue Mobilization were applied to the: Hip, ankle for 14 minutes, including:    Talocrural AP mobilizations in open pack position   Great toe joint mobility  Short axis hip distraction grade III    neuromuscular re-education activities to improve: Balance, Coordination, Kinesthetic, Sense, Proprioception, and Posture for 10 minutes. The following activities were included:    Seated towel crunches 3 x 15 reps   Seated with ball between heels heel raises 3 x 12 x 3" holds     therapeutic activities to improve functional performance for 00 minutes, including:      gait training to improve functional mobility and safety for 00 minutes, including:      Patient Education and Home Exercises     Home Exercises Provided and Patient Education Provided     Education provided:   - Continue with HEP  - Offloading with a cane/crutch to help normalize gait mechanics     Written Home Exercises Provided: Patient instructed to cont prior HEP. Exercises were reviewed and Dora was able to demonstrate them prior to the end of the session.  Dora demonstrated good  understanding of the education provided. See EMR under Patient Instructions for exercises provided during therapy sessions    ASSESSMENT     Dora tolerated first physical therapy session well with no adverse effects. She continues with gait dysfunction and limitations in ankle mobility. Further manual therapy utilized to help improve ankle mobility and progressions with foot intrinsics for stability and ankle " strengthening. Continued hip strengthening as well and good response to manual therapy intervention to improve hip mobility. She was educated on possibly utilizing a SPC or single axillary crutch to help offload her ankle and hip and improve gait mechanics as she continues with increased antalgic gait. Would continue to benefit from continued mobility work and progressing with strengthening and functional mobility. Continue to monitor and progress as able.     Dora Is progressing well towards her goals.   Pt prognosis is Good.     Pt will continue to benefit from skilled outpatient physical therapy to address the deficits listed in the problem list box on initial evaluation, provide pt/family education and to maximize pt's level of independence in the home and community environment.     Pt's spiritual, cultural and educational needs considered and pt agreeable to plan of care and goals.     Anticipated barriers to physical therapy: Scheduling, chronicity of symptoms, and complexity of surgery     Goals:   Short Term Goals: 6 weeks (Progressing, not met)  Patient will be independent in initial HEP to help supplement PT.  Patient will improve ankle dorsiflexion range of motion to 0 degrees to help with gait mechanics.   Patient will report pain at its worst to </= 5/10 to help improve quality of life.  Patient will report minimal discomfort (</= 3/10) with ambulation to help with ADLs.      Long Term Goals: 12-16 weeks (Progressing, not met)   Patient will be independent in updated HEP to help supplement PT and maintain gains made in PT.  Patient will improve FOTO score to >/= predicted to show improvement in condition.   Patient will report no pain with ambulation to help with ADLs.   Patient will improve ankle DF strength to >/= 4/5 to help with gait mechanics.     PLAN   Plan of care Certification: 10/11/2024 to 2/10/2025.     Continue with current PT POC, progressing as able/tolerated.     Moira Dewitt, PT,  DPT, OCS     Co-treated by: Mehdi Love, SPT

## 2024-10-22 ENCOUNTER — CLINICAL SUPPORT (OUTPATIENT)
Dept: REHABILITATION | Facility: HOSPITAL | Age: 34
End: 2024-10-22
Attending: ORTHOPAEDIC SURGERY
Payer: COMMERCIAL

## 2024-10-22 DIAGNOSIS — R29.898 DECREASED STRENGTH OF LOWER EXTREMITY: ICD-10-CM

## 2024-10-22 DIAGNOSIS — R26.9 GAIT ABNORMALITY: Primary | ICD-10-CM

## 2024-10-22 DIAGNOSIS — M25.671 DECREASED RANGE OF MOTION OF RIGHT ANKLE: ICD-10-CM

## 2024-10-22 PROCEDURE — 97140 MANUAL THERAPY 1/> REGIONS: CPT | Mod: CQ

## 2024-10-22 PROCEDURE — 97110 THERAPEUTIC EXERCISES: CPT | Mod: CQ

## 2024-10-22 PROCEDURE — 97112 NEUROMUSCULAR REEDUCATION: CPT | Mod: CQ

## 2024-10-22 NOTE — PROGRESS NOTES
[facility]   Physical Therapy Treatment Note     Name: Dora Hernandez  Clinic Number: [id number]    Therapy Diagnosis:   Encounter Diagnoses   Name Primary?    Gait abnormality Yes    Decreased range of motion of right ankle     Decreased strength of lower extremity      Physician: Surya Akhtar MD    Visit Date: 10/22/2024  Physician Orders: PT Eval and Treat   Medical Diagnosis from Referral:  Follow-up examination after orthopedic surgery [Z09], Arthrodesis status [Z98.1], Tarsal coalition of right foot [Q66.89]   Evaluation Date: 10/11/2024  Authorization Period Expiration: 12/31/2024  Plan of Care Expiration: 2/10/2025  Progress Note Due: 11/15/2024  Visit # / Visits authorized: 2/20   FOTO: 1/3    PTA Visit #: 0/5     FOTO first follow up:   FOTO second follow up:     Date of Surgery: 6/26/2024  Return to MD: 11/14/2024     Procedure: Right foot subtalar arthrodesis      Precautions: Standard and Fall     Time In: 8:15 am   Time Out: 9:00 am   Total Billable Time: 45 minutes    SUBJECTIVE     Pt reports: feel like I'm walking better. Got new shoes that help  She was compliant with home exercise program.  Response to previous treatment: Independent in HEP   Functional change: Ongoing     Pain: 3/10  Location: right ankle     OBJECTIVE     Objective Measures updated at progress report unless specified.     Observation 10/18/2024:  Range of Motion:  Ankle    Right A/P Left A/P   Dorsiflexion -5 degrees / -1 degrees (lack) 0 degrees / 2 degrees    Plantarflexion 30 degrees / 35 degrees  45 degrees / 50 degrees    Inversion 10 degrees* / 12 degrees* 18 degrees / 23 degrees    Eversion  2 degrees / 6 degrees  12 degrees / 16 degrees    *denotes pain     Treatment     Dora received the treatments listed below:      therapeutic exercises to develop strength, endurance, ROM, flexibility, posture, and core stabilization for 23 minutes including:    Longsitting Gastroc stretch  "with strap 5 x 20" holds   Self DF mob on 20" box 40x  Sidelying hip AB 3 x 8-10 reps   Ankle DF with YellowTB 3 x 15 reps   Ankle PF with GreenTB 3 x 15 reps     Not Performed:  Seated DF heel slides 1 x 10 x 3-5" holds   Seated AAROM heel raises 1 x 5 reps   Standing DF self mobilizations with OrangePowerband 1 x 20 x 3" holds     manual therapy techniques: Joint mobilizations and Soft tissue Mobilization were applied to the: Hip, ankle for 12 minutes, including:    Talocrural AP mobilizations in open pack position   Great toe joint mobility  Short axis hip distraction grade III    neuromuscular re-education activities to improve: Balance, Coordination, Kinesthetic, Sense, Proprioception, and Posture for 10 minutes. The following activities were included:    Seated towel crunches 3 x 15 reps   Seated with ball between heels heel raises 3 x 12 x 3" holds     therapeutic activities to improve functional performance for 00 minutes, including:      gait training to improve functional mobility and safety for 00 minutes, including:      Patient Education and Home Exercises     Home Exercises Provided and Patient Education Provided     Education provided:   - Continue with HEP  - Offloading with a cane/crutch to help normalize gait mechanics     Written Home Exercises Provided: Patient instructed to cont prior HEP. Exercises were reviewed and Dora was able to demonstrate them prior to the end of the session.  Dora demonstrated good  understanding of the education provided. See EMR under Patient Instructions for exercises provided during therapy sessions    ASSESSMENT     Dora still ambulates with her R LE ER but does show improvements from previous session. Cueing needed with heel raises with ball to not ankle invert and tibial ER. Hip fatigue noted with hip abduction and IR exercises. Will cont to work on hip strength and ankle mobility as well as improving gait.    Dora Is progressing well towards her goals. "   Pt prognosis is Good.     Pt will continue to benefit from skilled outpatient physical therapy to address the deficits listed in the problem list box on initial evaluation, provide pt/family education and to maximize pt's level of independence in the home and community environment.     Pt's spiritual, cultural and educational needs considered and pt agreeable to plan of care and goals.     Anticipated barriers to physical therapy: Scheduling, chronicity of symptoms, and complexity of surgery     Goals:   Short Term Goals: 6 weeks (Progressing, not met)  Patient will be independent in initial HEP to help supplement PT.  Patient will improve ankle dorsiflexion range of motion to 0 degrees to help with gait mechanics.   Patient will report pain at its worst to </= 5/10 to help improve quality of life.  Patient will report minimal discomfort (</= 3/10) with ambulation to help with ADLs.      Long Term Goals: 12-16 weeks (Progressing, not met)   Patient will be independent in updated HEP to help supplement PT and maintain gains made in PT.  Patient will improve FOTO score to >/= predicted to show improvement in condition.   Patient will report no pain with ambulation to help with ADLs.   Patient will improve ankle DF strength to >/= 4/5 to help with gait mechanics.     PLAN   Plan of care Certification: 10/11/2024 to 2/10/2025.     Continue with current PT POC, progressing as able/tolerated.     Jeannie Gonzalez PTA,

## 2024-10-25 ENCOUNTER — CLINICAL SUPPORT (OUTPATIENT)
Dept: REHABILITATION | Facility: HOSPITAL | Age: 34
End: 2024-10-25
Payer: COMMERCIAL

## 2024-10-25 DIAGNOSIS — R29.898 DECREASED STRENGTH OF LOWER EXTREMITY: ICD-10-CM

## 2024-10-25 DIAGNOSIS — M25.671 DECREASED RANGE OF MOTION OF RIGHT ANKLE: ICD-10-CM

## 2024-10-25 DIAGNOSIS — R26.9 GAIT ABNORMALITY: Primary | ICD-10-CM

## 2024-10-25 PROCEDURE — 97110 THERAPEUTIC EXERCISES: CPT

## 2024-10-25 PROCEDURE — 97112 NEUROMUSCULAR REEDUCATION: CPT

## 2024-10-25 PROCEDURE — 97140 MANUAL THERAPY 1/> REGIONS: CPT

## 2024-10-25 NOTE — PROGRESS NOTES
[facility]   Physical Therapy Treatment Note     Name: Dora Hernandez  Clinic Number: [id number]    Therapy Diagnosis:   Encounter Diagnoses   Name Primary?    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 She feels the new shoes are helping as well. She has a [place] this weekend in the [place] so she will be on her feet a lot and worried with that but otherwise doing well.   She was compliant with home exercise program.  Response to previous treatment: Independent in HEP   Functional change: Ongoing     Pain: 3/10  Location: right ankle     OBJECTIVE     Objective Measures updated at progress report unless specified.     Observation 10/18/2024:  Range of Motion:  Ankle    Right A/P Left A/P   Dorsiflexion -5 degrees / -1 degrees (lack) 0 degrees / 2 degrees    Plantarflexion 30 degrees / 35 degrees  45 degrees / 50 degrees    Inversion 10 degrees* / 12 degrees* 18 degrees / 23 degrees    Eversion  2 degrees / 6 degrees  12 degrees / 16 degrees    *denotes pain     Treatment  "    Dora received the treatments listed below:      therapeutic exercises to develop strength, endurance, ROM, flexibility, posture, and core stabilization for 20 minutes including:    Standing self DF mobs 1 x 20 x 3" holds   Longsitting Gastroc stretch with strap 5 x 20" holds   Ankle PF with GreenTB 3 x 12 reps   Ankle DF with Red-GreenTB 3 x 12 reps   Sidelying clamshells with GreenTB 2 x 12 reps each     Not Performed:  Seated DF heel slides 1 x 10 x 3-5" holds   Seated AAROM heel raises 1 x 5 reps   Standing DF self mobilizations with OrangePowerband 1 x 20 x 3" holds   Self DF mob on 20" box 40x  Sidelying hip AB 3 x 8-10 reps     manual therapy techniques: Joint mobilizations and Soft tissue Mobilization were applied to the: Hip, ankle for 12 minutes, including:    Talocrural AP mobilizations in open pack position   Great toe joint mobility  Short axis hip distraction grade III    neuromuscular re-education activities to improve: Balance, Coordination, Kinesthetic, Sense, Proprioception, and Posture for 25 minutes. The following activities were included:    Seated towel crunches 3 x 15 reps   Seated with ball between heels heel raises 3 x 12 x 3" holds   DL shuttle press heel raises with ball between heels 25# 3 x 12 reps   Seated FHL ankle PF/DF on fitter 2 x 10 reps   Stepping over dora working on heel to toe gait pattern x 5'     therapeutic activities to improve functional performance for 00 minutes, including:      gait training to improve functional mobility and safety for 00 minutes, including:      Patient Education and Home Exercises     Home Exercises Provided and Patient Education Provided     Education provided:   - Continue with HEP  - Offloading with a cane/crutch to help normalize gait mechanics     Written Home Exercises Provided: Patient instructed to cont prior HEP. Exercises were reviewed and Dora was able to demonstrate them prior to the end of the session.  Dora demonstrated good "  understanding of the education provided. See EMR under Patient Instructions for exercises provided during therapy sessions    ASSESSMENT     Dora presented to today's session with improvements in gait mechanics but continues to display increased hip ER and limited hip and knee flexion during swing. Session focused on improving gait mechanics with cueing for form and good response to dora utilization. Would benefit from possible utilization of alterG in future sessions to continue to help normalize gait mechanics. Continued manual therapy improving ankle mobility and progressed with strengthening. She fatigues quickly with hip strengthening but able to perform. Will continue to monitor and progress as able.     Dora Is progressing well towards her goals.   Pt prognosis is Good.     Pt will continue to benefit from skilled outpatient physical therapy to address the deficits listed in the problem list box on initial evaluation, provide pt/family education and to maximize pt's level of independence in the home and community environment.     Pt's spiritual, cultural and educational needs considered and pt agreeable to plan of care and goals.     Anticipated barriers to physical therapy: Scheduling, chronicity of symptoms, and complexity of surgery     Goals:   Short Term Goals: 6 weeks (Progressing, not met)  Patient will be independent in initial HEP to help supplement PT. - MET  Patient will improve ankle dorsiflexion range of motion to 0 degrees to help with gait mechanics.   Patient will report pain at its worst to </= 5/10 to help improve quality of life.  Patient will report minimal discomfort (</= 3/10) with ambulation to help with ADLs.      Long Term Goals: 12-16 weeks (Progressing, not met)   Patient will be independent in updated HEP to help supplement PT and maintain gains made in PT.  Patient will improve FOTO score to >/= predicted to show improvement in condition.   Patient will report no pain with  ambulation to help with ADLs.   Patient will improve ankle DF strength to >/= 4/5 to help with gait mechanics.     PLAN   Plan of care Certification: 10/11/2024 to 2/10/2025.     Continue with current PT POC, progressing as able/tolerated.     Moira Dewitt, PT, DPT, OCS   Co-treated by: Mehdi Love, SPT

## 2024-10-29 ENCOUNTER — CLINICAL SUPPORT (OUTPATIENT)
Dept: REHABILITATION | Facility: HOSPITAL | Age: 34
End: 2024-10-29
Payer: COMMERCIAL

## 2024-10-29 ENCOUNTER — OFFICE VISIT (OUTPATIENT)
Dept: URGENT CARE | Facility: CLINIC | Age: 34
End: 2024-10-29
Payer: COMMERCIAL

## 2024-10-29 VITALS
WEIGHT: 253 LBS | TEMPERATURE: 98 F | BODY MASS INDEX: 39.71 KG/M2 | HEART RATE: 96 BPM | OXYGEN SATURATION: 97 % | SYSTOLIC BLOOD PRESSURE: 116 MMHG | DIASTOLIC BLOOD PRESSURE: 70 MMHG | HEIGHT: 67 IN

## 2024-10-29 DIAGNOSIS — R26.9 GAIT ABNORMALITY: Primary | ICD-10-CM

## 2024-10-29 DIAGNOSIS — R05.9 COUGH, UNSPECIFIED TYPE: ICD-10-CM

## 2024-10-29 DIAGNOSIS — R29.898 DECREASED STRENGTH OF LOWER EXTREMITY: ICD-10-CM

## 2024-10-29 DIAGNOSIS — B34.9 ACUTE VIRAL SYNDROME: Primary | ICD-10-CM

## 2024-10-29 DIAGNOSIS — M25.671 DECREASED RANGE OF MOTION OF RIGHT ANKLE: ICD-10-CM

## 2024-10-29 LAB
CTP QC/QA: YES
SARS-COV-2 AG RESP QL IA.RAPID: NEGATIVE

## 2024-10-29 PROCEDURE — 87811 SARS-COV-2 COVID19 W/OPTIC: CPT | Mod: QW,S$GLB,, | Performed by: NURSE PRACTITIONER

## 2024-10-29 PROCEDURE — 99213 OFFICE O/P EST LOW 20 MIN: CPT | Mod: S$GLB,,, | Performed by: NURSE PRACTITIONER

## 2024-10-29 PROCEDURE — 97110 THERAPEUTIC EXERCISES: CPT | Mod: CQ

## 2024-10-29 PROCEDURE — 97140 MANUAL THERAPY 1/> REGIONS: CPT | Mod: CQ

## 2024-10-29 PROCEDURE — 97530 THERAPEUTIC ACTIVITIES: CPT | Mod: CQ

## 2024-10-29 RX ORDER — PROMETHAZINE HYDROCHLORIDE AND DEXTROMETHORPHAN HYDROBROMIDE 6.25; 15 MG/5ML; MG/5ML
5 SYRUP ORAL NIGHTLY PRN
Qty: 120 ML | Refills: 0 | Status: SHIPPED | OUTPATIENT
Start: 2024-10-29 | End: 2024-11-08

## 2024-10-31 ENCOUNTER — PATIENT MESSAGE (OUTPATIENT)
Dept: REHABILITATION | Facility: HOSPITAL | Age: 34
End: 2024-10-31
Payer: COMMERCIAL

## 2024-11-05 ENCOUNTER — CLINICAL SUPPORT (OUTPATIENT)
Dept: REHABILITATION | Facility: HOSPITAL | Age: 34
End: 2024-11-05
Payer: COMMERCIAL

## 2024-11-05 DIAGNOSIS — R29.898 DECREASED STRENGTH OF LOWER EXTREMITY: ICD-10-CM

## 2024-11-05 DIAGNOSIS — R26.9 GAIT ABNORMALITY: Primary | ICD-10-CM

## 2024-11-05 DIAGNOSIS — M25.671 DECREASED RANGE OF MOTION OF RIGHT ANKLE: ICD-10-CM

## 2024-11-05 PROCEDURE — 97530 THERAPEUTIC ACTIVITIES: CPT | Mod: CQ

## 2024-11-05 PROCEDURE — 97140 MANUAL THERAPY 1/> REGIONS: CPT | Mod: CQ

## 2024-11-05 PROCEDURE — 97110 THERAPEUTIC EXERCISES: CPT | Mod: CQ

## 2024-11-05 NOTE — PROGRESS NOTES
[facility]   Physical Therapy Treatment Note     Name: Dora Hernandez  Clinic Number: [id number]    Therapy Diagnosis:   Encounter Diagnoses   Name Primary?    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 Did not wear boot on Saturday    She was compliant with home exercise program.  Response to previous treatment: Independent in HEP   Functional change: Ongoing     Pain: 3/10  Location: right ankle     OBJECTIVE     Objective Measures updated at progress report unless specified.     Observation 10/18/2024:  Range of Motion:  Ankle    Right A/P Left A/P   Dorsiflexion -5 degrees / -1 degrees (lack) 0 degrees / 2 degrees    Plantarflexion 30 degrees / 35 degrees  45 degrees / 50 degrees    Inversion 10 degrees* / 12 degrees* 18 degrees / 23 degrees    Eversion  2 degrees / 6 degrees  12 degrees / 16 degrees    *denotes pain     Treatment     Dora received the treatments listed below:      therapeutic exercises to develop strength, endurance, ROM, flexibility, posture, and core stabilization for 15 minutes  "including:    Standing self DF mobs 1 x 30 x 3" holds   Sidelying clamshells with GreenTB 3x10 reps each B      Not Performed:  Longsitting Gastroc stretch with strap 5 x 20" holds   Ankle PF with GreenTB 3 x 12 reps   Ankle DF with Red-GreenTB 3 x 12 reps   Self DF mob with black power band x 30    manual therapy techniques: Joint mobilizations and Soft tissue Mobilization were applied to the: Hip, ankle for 10 minutes, including:    Talocrural AP mobilizations in open pack position   Great toe joint mobility  Short axis hip distraction grade III    neuromuscular re-education activities to improve: Balance, Coordination, Kinesthetic, Sense, Proprioception, and Posture for 00 minutes. The following activities were included:    Seated towel crunches 3 x 15 reps   Seated with ball between heels heel raises 3 x 12 x 3" holds   DL shuttle press heel raises with ball between heels 25# 3 x 12 reps   Stepping over dora working on heel to toe gait pattern x 5'   Seated FHL ankle PF/DF on fitter 2 x 10 reps  therapeutic activities to improve functional performance for 35 minutes, including:  Bike x8 min  Alter G 50% WB x 15 min  DL rocker board to improve push off with gait x30    Np:  R foot rocking on toes x 5 min  R foot rocking step through x 5 min  Walking with stick on turf    Standing fitter to improve DF/PF mobility for ambulating   gait training to improve functional mobility and safety for 00 minutes, including:      Patient Education and Home Exercises     Home Exercises Provided and Patient Education Provided     Education provided:   - Continue with HEP  - Offloading with a cane/crutch to help normalize gait mechanics     Written Home Exercises Provided: Patient instructed to cont prior HEP. Exercises were reviewed and Dora was able to demonstrate them prior to the end of the session.  Dora demonstrated good  understanding of the education provided. See EMR under Patient Instructions for exercises provided " during therapy sessions    ASSESSMENT     Dora ambulated into clinic today with decreased femoral ER cut still presents with decreased DF with heel strike. TC mobility has improved since starting PT. Introduced [place] DELCID today to work on gait with decreasing body weight. Significant improvement with DF with heel strike with assist of weston DELCID.     Dora Is progressing well towards her goals.   Pt prognosis is Good.     Pt will continue to benefit from skilled outpatient physical therapy to address the deficits listed in the problem list box on initial evaluation, provide pt/family education and to maximize pt's level of independence in the home and community environment.     Pt's spiritual, cultural and educational needs considered and pt agreeable to plan of care and goals.     Anticipated barriers to physical therapy: Scheduling, chronicity of symptoms, and complexity of surgery     Goals:   Short Term Goals: 6 weeks (Progressing, not met)  Patient will be independent in initial HEP to help supplement PT. - MET  Patient will improve ankle dorsiflexion range of motion to 0 degrees to help with gait mechanics.   Patient will report pain at its worst to </= 5/10 to help improve quality of life.  Patient will report minimal discomfort (</= 3/10) with ambulation to help with ADLs.      Long Term Goals: 12-16 weeks (Progressing, not met)   Patient will be independent in updated HEP to help supplement PT and maintain gains made in PT.  Patient will improve FOTO score to >/= predicted to show improvement in condition.   Patient will report no pain with ambulation to help with ADLs.   Patient will improve ankle DF strength to >/= 4/5 to help with gait mechanics.     PLAN   Plan of care Certification: 10/11/2024 to 2/10/2025.     Continue with current PT POC, progressing as able/tolerated.     Jeannie Gonzalez, PTA,

## 2024-11-19 ENCOUNTER — OFFICE VISIT (OUTPATIENT)
Dept: URGENT CARE | Facility: CLINIC | Age: 34
End: 2024-11-19
Payer: COMMERCIAL

## 2024-11-19 VITALS
OXYGEN SATURATION: 97 % | RESPIRATION RATE: 18 BRPM | BODY MASS INDEX: 39.71 KG/M2 | WEIGHT: 253 LBS | DIASTOLIC BLOOD PRESSURE: 80 MMHG | HEIGHT: 67 IN | SYSTOLIC BLOOD PRESSURE: 113 MMHG | HEART RATE: 90 BPM | TEMPERATURE: 98 F

## 2024-11-19 DIAGNOSIS — J01.90 ACUTE BACTERIAL SINUSITIS: ICD-10-CM

## 2024-11-19 DIAGNOSIS — R05.9 COUGH, UNSPECIFIED TYPE: Primary | ICD-10-CM

## 2024-11-19 DIAGNOSIS — B96.89 ACUTE BACTERIAL SINUSITIS: ICD-10-CM

## 2024-11-19 DIAGNOSIS — J20.9 ACUTE BRONCHITIS, UNSPECIFIED ORGANISM: ICD-10-CM

## 2024-11-19 PROCEDURE — 71046 X-RAY EXAM CHEST 2 VIEWS: CPT | Mod: S$GLB,,, | Performed by: RADIOLOGY

## 2024-11-19 PROCEDURE — 99214 OFFICE O/P EST MOD 30 MIN: CPT | Mod: S$GLB,,, | Performed by: FAMILY MEDICINE

## 2024-11-19 RX ORDER — BENZONATATE 100 MG/1
100 CAPSULE ORAL 3 TIMES DAILY PRN
Qty: 30 CAPSULE | Refills: 0 | Status: SHIPPED | OUTPATIENT
Start: 2024-11-19 | End: 2024-11-29

## 2024-11-19 RX ORDER — DOXYCYCLINE 100 MG/1
100 CAPSULE ORAL 2 TIMES DAILY
Qty: 20 CAPSULE | Refills: 0 | Status: SHIPPED | OUTPATIENT
Start: 2024-11-19 | End: 2024-11-29

## 2024-11-19 NOTE — PROGRESS NOTES
"Subjective:      Patient ID: Dora Hernandez is a 34 y.o. female.    Vitals:  height is 5' 7" (1.702 m) and weight is 114.8 kg (253 lb). Her tympanic temperature is 97.7 °F (36.5 °C). Her blood pressure is 113/80 and her pulse is 90. Her respiration is 18 and oxygen saturation is 97%.     Chief Complaint: Cough    35 y/o female c/o witth a cough that's getting worst. Patient states that she his spitting up yellow mucus. Patient states that shew came in recently and her cough just wont go. Patient states she took Otc meds. Patient state she took COVID and flu test all negative las visit. Patient also states that when she cough she is having chest pains.     Pt is a 35 yo F who presents with sinus pressure and pina for about 3 weeks.  She has also developed a productive cough.  She has tried OTC meds without relief. Also reports that her face is tender.  Symptoms have been persistent since she was seen on 10/29/2024    Cough  This is a new problem. The current episode started in the past 7 days. The problem has been gradually worsening. The problem occurs constantly. Associated symptoms include chest pain, ear pain, headaches, nasal congestion, a sore throat, shortness of breath and wheezing. Pertinent negatives include no chills, ear congestion, fever, heartburn, hemoptysis, myalgias, postnasal drip, rash, rhinorrhea, sweats or weight loss. Nothing aggravates the symptoms. She has tried nothing (promethozine, Benadryl) for the symptoms. The treatment provided no relief. There is no history of asthma, bronchiectasis, bronchitis, COPD, emphysema, environmental allergies or pneumonia.       Constitution: Negative for chills and fever.   HENT:  Positive for ear pain and sore throat. Negative for postnasal drip.    Cardiovascular:  Positive for chest pain.   Respiratory:  Positive for cough, shortness of breath and wheezing. Negative for bloody sputum.    Gastrointestinal:  Negative for heartburn. "   Musculoskeletal:  Negative for muscle ache.   Skin:  Negative for rash.   Allergic/Immunologic: Negative for environmental allergies.   Neurological:  Positive for headaches.      Objective:     Physical Exam   Constitutional: She is oriented to person, place, and time. She appears well-developed. She is cooperative.  Non-toxic appearance. She does not appear ill. No distress.   HENT:   Head: Normocephalic and atraumatic.   Ears:   Right Ear: Hearing, tympanic membrane, external ear and ear canal normal.   Left Ear: Hearing, tympanic membrane, external ear and ear canal normal.   Nose: No mucosal edema, rhinorrhea or nasal deformity. No epistaxis. Right sinus exhibits maxillary sinus tenderness and frontal sinus tenderness. Left sinus exhibits maxillary sinus tenderness and frontal sinus tenderness.   Mouth/Throat: Uvula is midline, oropharynx is clear and moist and mucous membranes are normal. No trismus in the jaw. Normal dentition. No uvula swelling. No oropharyngeal exudate, posterior oropharyngeal edema or posterior oropharyngeal erythema.   Eyes: Conjunctivae and lids are normal. No scleral icterus.   Neck: Trachea normal and phonation normal. Neck supple. No edema present. No erythema present. No neck rigidity present.   Cardiovascular: Normal rate, regular rhythm, normal heart sounds and normal pulses.   Pulmonary/Chest: Effort normal. No respiratory distress. She has decreased breath sounds in the right lower field and the left lower field. She has no rhonchi.   Abdominal: Normal appearance.   Musculoskeletal: Normal range of motion.         General: No deformity. Normal range of motion.   Neurological: She is alert and oriented to person, place, and time. She exhibits normal muscle tone. Coordination normal.   Skin: Skin is warm, dry, intact, not diaphoretic and not pale.   Psychiatric: Her speech is normal and behavior is normal. Judgment and thought content normal.   Nursing note and vitals  reviewed.    X-Ray Chest PA And Lateral    Result Date: 11/19/2024  EXAMINATION: XR CHEST PA AND LATERAL CLINICAL HISTORY: Cough, unspecified TECHNIQUE: PA and lateral views of the chest were performed. COMPARISON: 12/13/2022 FINDINGS: The cardiac and mediastinal silhouettes appear within normal limits.   The lungs are clear bilaterally.  No acute osseous findings demonstrated.     No acute findings. Electronically signed by: Ashvin Kim Date:    11/19/2024 Time:    14:29     Assessment:     1. Cough, unspecified type    2. Acute bacterial sinusitis    3. Acute bronchitis, unspecified organism        Plan:       Cough, unspecified type  -     X-Ray Chest PA And Lateral; Future; Expected date: 11/19/2024    Acute bacterial sinusitis  -     doxycycline (VIBRAMYCIN) 100 MG Cap; Take 1 capsule (100 mg total) by mouth 2 (two) times daily. for 10 days  Dispense: 20 capsule; Refill: 0    Acute bronchitis, unspecified organism  -     benzonatate (TESSALON) 100 MG capsule; Take 1 capsule (100 mg total) by mouth 3 (three) times daily as needed for Cough.  Dispense: 30 capsule; Refill: 0      Patient Instructions   Rest  Fluids  Warm tea with honey and lemon  Warm salt water gargles  Warm sinus compresses  mucinex (guaifenesin)  Nasal saline  Tylenol or advil for pain or fever  IF no improvement or worsening, return for re-evaluation     You must understand that you have received treatment at an Urgent Care facility only, and that you may be  released before all of your medical problems are known or treated. Urgent Care facilities are not equipped to  handle life threatening emergencies. It is recommended that you seek care at an Emergency Department for  further evaluation of worsening or concerning symptoms, or possibly life threatening conditions as  discussed.      If you develop chest pain, shortness of breath, throat swelling, tongue swelling, lightheadedness or any other causes for concern, proceed to ER.

## 2024-12-09 ENCOUNTER — PATIENT MESSAGE (OUTPATIENT)
Dept: REHABILITATION | Facility: HOSPITAL | Age: 34
End: 2024-12-09
Payer: COMMERCIAL

## 2025-01-15 ENCOUNTER — LAB VISIT (OUTPATIENT)
Dept: LAB | Facility: HOSPITAL | Age: 35
End: 2025-01-15
Attending: PHYSICIAN ASSISTANT
Payer: COMMERCIAL

## 2025-01-15 ENCOUNTER — OFFICE VISIT (OUTPATIENT)
Dept: OTOLARYNGOLOGY | Facility: CLINIC | Age: 35
End: 2025-01-15
Payer: COMMERCIAL

## 2025-01-15 VITALS — BODY MASS INDEX: 41.57 KG/M2 | WEIGHT: 265.44 LBS

## 2025-01-15 DIAGNOSIS — K11.7 XEROSTOMIA DUE TO MOUTH BREATHING: ICD-10-CM

## 2025-01-15 DIAGNOSIS — R06.5 XEROSTOMIA DUE TO MOUTH BREATHING: ICD-10-CM

## 2025-01-15 DIAGNOSIS — R49.0 DYSPHONIA: ICD-10-CM

## 2025-01-15 DIAGNOSIS — J32.9 RECURRENT SINUSITIS: ICD-10-CM

## 2025-01-15 DIAGNOSIS — J31.0 CHRONIC RHINITIS: ICD-10-CM

## 2025-01-15 DIAGNOSIS — R05.3 CHRONIC COUGH: Primary | ICD-10-CM

## 2025-01-15 LAB — IGE SERPL-ACNC: 105 IU/ML (ref 0–100)

## 2025-01-15 PROCEDURE — 99999 PR PBB SHADOW E&M-EST. PATIENT-LVL II: CPT | Mod: PBBFAC,,, | Performed by: PHYSICIAN ASSISTANT

## 2025-01-15 PROCEDURE — 86003 ALLG SPEC IGE CRUDE XTRC EA: CPT | Performed by: PHYSICIAN ASSISTANT

## 2025-01-15 PROCEDURE — 1159F MED LIST DOCD IN RCRD: CPT | Mod: CPTII,S$GLB,, | Performed by: PHYSICIAN ASSISTANT

## 2025-01-15 PROCEDURE — 36415 COLL VENOUS BLD VENIPUNCTURE: CPT | Performed by: PHYSICIAN ASSISTANT

## 2025-01-15 PROCEDURE — 99204 OFFICE O/P NEW MOD 45 MIN: CPT | Mod: 25,S$GLB,, | Performed by: PHYSICIAN ASSISTANT

## 2025-01-15 PROCEDURE — 3008F BODY MASS INDEX DOCD: CPT | Mod: CPTII,S$GLB,, | Performed by: PHYSICIAN ASSISTANT

## 2025-01-15 PROCEDURE — 82785 ASSAY OF IGE: CPT | Performed by: PHYSICIAN ASSISTANT

## 2025-01-15 PROCEDURE — 86003 ALLG SPEC IGE CRUDE XTRC EA: CPT | Mod: 59 | Performed by: PHYSICIAN ASSISTANT

## 2025-01-15 PROCEDURE — 31231 NASAL ENDOSCOPY DX: CPT | Mod: S$GLB,,, | Performed by: PHYSICIAN ASSISTANT

## 2025-01-15 PROCEDURE — 86317 IMMUNOASSAY INFECTIOUS AGENT: CPT | Mod: 59 | Performed by: PHYSICIAN ASSISTANT

## 2025-01-15 RX ORDER — TRIAMCINOLONE ACETONIDE 55 UG/1
2 SPRAY, METERED NASAL DAILY
Qty: 6.7 ML | Refills: 0 | Status: SHIPPED | OUTPATIENT
Start: 2025-01-15

## 2025-01-15 NOTE — PROGRESS NOTES
Subjective:     HPI: Dora Hernandez is a 35 y.o. female who was self-referred for sinusitis.    Patient reports developing a severe episode of sinusitis about 3 months ago.  Patient reports initially developing facial pressure followed by productive cough since 10/29/24.  She was evaluated at urgent care on 10/29/24 and had negative COVID testing.  She was prescribed promethazine cough syrup.  Symptoms persisted so she was seen at urgent care on 11/19/24 and prescribed Tessalon Perles and doxycycline.  Current symptoms include significant postnasal drip in the morning with associated dry mouth.  Persistent/daily nose and throat congestion, rhinorrhea, and sneezing.  Infrequent watery/itchy eyes.  Patient reports 4-5 sinus infections per year worse in the winter and spring.  Patient denies chronic bilateral nasal obstruction and mouth breathing.    She also reports right ear pain yesterday felt as a sharpness.  She reports pain with chewing at times.    Triggers for the cough include the following:   - voice use:  yes  - breathing heavily:  no  - laughing:  no  - eating:  no  - drinking cold liquids:  yes  - strong odors:  yes    - post-prandial:  no  - lying down:  no    Taking an ARB/ACEI: no    Prior workup includes the following:  - pulmonary:  no  - GI:  no  - allergy:  no  - ENT:  no    Current sinonasal medications include none at present.  The last course of antibiotics was recently.    She does not recall previously having allergy testing.  She denies a history of asthma.  She denies a history of reflux symptoms.  No EGD on file.   She denies a diagnosis of obstructive sleep apnea.   She does not recall a prior history of nasal trauma.  She has not had sinonasal surgery.    She has not had a tonsillectomy.    SNOT-22 score: : (Patient-Rptd) (P) 74  NOSE score:: (Patient-Rptd) (P) 85%  ETDQ-7 score:: (Patient-Rptd) (P) 6.1    Past Medical/Past Surgical History  Past Medical History:   Diagnosis Date     Abnormal Pap smear     Anxiety     Depression     Migraine headache     Polycystic disease, ovaries      She has a past surgical history that includes Tympanostomy and Fusion of subtalar joint (Right, 6/26/2024).    Family History/Social History  Her family history includes Hypertension in her father; Lung cancer in her maternal grandmother; Stroke in her father; Thyroid disease in her mother.  She reports that she has never smoked. She has never been exposed to tobacco smoke. She has never used smokeless tobacco. She reports current alcohol use. She reports that she does not use drugs.    Allergies/Immunizations  She is allergic to metformin.  Immunization History   Administered Date(s) Administered    COVID-19 MRNA, LN-S PF (MODERNA HALF 0.25 ML DOSE) 01/02/2022    COVID-19, MRNA, LN-S, PF (MODERNA FULL 0.5 ML DOSE) 03/10/2021, 04/07/2021    MMR 10/08/1994, 02/26/2020    Meningococcal Conjugate (MCV4P) 01/23/2008    Tdap 01/28/2020        Medications   ergocalciferol Cap  ibuprofen  paroxetine     Review of Systems     Constitutional: Positive for fatigue.      HENT: Positive for ear discharge, ear infection, ear pain, facial swelling, postnasal drip, sinus infection, sinus pressure, sore throat and voice change.      Eyes:  Negative for change in eyesight, eye drainage, eye itching and photophobia.     Respiratory:  Positive for cough, snoring and wheezing.      Cardiovascular:  Negative for chest pain, foot swelling, irregular heartbeat and swollen veins.     Gastrointestinal:  Negative for abdominal pain, acid reflux, constipation, diarrhea, heartburn and vomiting.     Genitourinary: Negative for difficulty urinating, sexual problems and frequent urination.     Musc: Positive for aching muscles, back pain and neck pain.     Skin: Negative for rash.     Allergy: Positive for seasonal allergies.     Endocrine: Negative for cold intolerance and heat intolerance.      Neurological: Positive for dizziness,  "headaches and light-headedness.     Hematologic: Negative for bruises/bleeds easily and swollen glands.      Psychiatric: Positive for depression and nervous/anxious.         Objective:     Wt 120.4 kg (265 lb 6.9 oz)   BMI 41.57 kg/m²      Physical Exam  Vitals reviewed.   Constitutional:       Appearance: Normal appearance.   HENT:      Head: Normocephalic and atraumatic.      Jaw: No trismus, tenderness or pain on movement.      Right Ear: Ear canal and external ear normal. Tympanic membrane is scarred.      Left Ear: Ear canal and external ear normal. Tympanic membrane is scarred.      Nose: No septal deviation.      Right Turbinates: Enlarged.      Left Turbinates: Enlarged.      Right Sinus: No maxillary sinus tenderness or frontal sinus tenderness.      Left Sinus: No maxillary sinus tenderness or frontal sinus tenderness.      Mouth/Throat:      Lips: Pink.      Mouth: Mucous membranes are moist.      Tongue: No lesions. Tongue does not deviate from midline.      Palate: No mass and lesions.      Pharynx: Oropharynx is clear. Uvula midline.      Tonsils: 2+ on the right. 2+ on the left.   Pulmonary:      Effort: Pulmonary effort is normal.   Neurological:      Mental Status: She is alert.       Procedure    Nasal endoscopy performed.  See procedure note.    Data Reviewed  I personally reviewed the chart, including any outside records, and pertinent data below:    I reviewed the following notes Internal Medicine     WBC (K/uL)   Date Value   06/03/2024 7.55     Eosinophil % (%)   Date Value   06/03/2024 5.7     Eos # (K/uL)   Date Value   06/03/2024 0.4     Platelets (K/uL)   Date Value   06/03/2024 416     Glucose (mg/dL)   Date Value   06/03/2024 87     No results found for: "IGE"    I independently reviewed the images of the CT head dated 6/3/24. Pertinent findings include left septal deviation and overall patent sinuses    Assessment & Plan:     1. Chronic cough   - suspect multifactorial in nature " including xerostomia, rhinitis, and possibly LPR.   - unable to perform laryngoscopy due to significant gag reflex/intolerance   - scope with significant rhinitis and without evidence of polyposis, purulence, or masses   - advised to cut back on chocolate and caffeine  2. Dysphonia   - intermittent voice changes, likely from coughing  3. Chronic rhinitis  -    checking inhalant immunocaps  - START saline rinse  - START INCS  -     triamcinolone (NASACORT) 55 mcg nasal inhaler; 2 sprays by Nasal route once daily.  Dispense: 6.7 mL; Refill: 0    4. Recurrent sinusitis  -     S.pneumoniae IgG Serotypes; Future; Expected date: 02/26/2025  -     Nasal/sinus endoscopy    5. Xerostomia due to mouth breathing  - Xylimelts QHS; Biotene mouth wash during the day  - Increase your water intake to 64-80 oz daily  - consider room humidifier     She will follow up in 1 month  I had a discussion with the patient regarding her condition and the further workup and management options.    All questions were answered, and the patient is in agreement with the above.     Disclaimer:  This note may have been prepared utilizing voice recognition software which may result in occasional typographical errors in the text such as sound alike words.   If further clarification is needed, please contact the ENT department of [facility].

## 2025-01-15 NOTE — PATIENT INSTRUCTIONS
Nasal saline rinse (use Neti pot or NeilMed sinus rinse kit) -- Rinse your sinuses once to twice daily to reduce the allergen burden in your nose.Use sterile water (boiled tap water which has cooled) or distilled water. Add 1/4 teaspoon sea salt and a pinch of baking soda or a mixture packet from the maker of your sinus rinse kit.  Rinse through both sides of nose to cleanse sinus and nasal passages, bending forward with head tilted down. Keep your mouth open, without holding your breath. Squeeze bottle gently until solution starts draining from the opposite nasal passage. After bottle is empty, blow nose very gently, without pinching nose completely, to avoid pressure on eardrums.  The Instructions in the box are detailed so please read them before using. You can also watch YouTube videos for a demonstration of how to do these properly.  Please use the nasal saline wash about 15 minutes before applying your medicated nasal sprays.    Nasal Steroid Spray (Flonase, Nasaocort, Rhinocort, Sensimyst)  Generic names for over the counter sprays: fluticasone propionate, triamcinolone acetonide, budesonide  You have been prescribed or instructed to take a nasal steroid spray (Flonase). Some symptoms will experience relief within a few days; however, it may take 2-3 weeks to begin to see improvement. This medication needs to be taken consistently to see results.    Use as directed and please be aware the Flonase takes 7-10 days of consistent use before becoming effective- so try to be patient :)    Helpful hints for maximizing nasal spray medication into the nose  - Use the opposite hand to spray the nostril (example: right hand for left nostril). This will help avoid spraying the medication onto the septum (the area that divides the left and right nasal cavity.)  - Tilt the bottle so that it is facing at a slight angle up or straight back, but avoid pointing the bottle straight up while spraying.   - Gently sniff (do not  "snort) a few seconds after spraying.     Azelastine (Astepro, Astelin)  This is a nasal spray that primarily treats nasal drainage/runny nose (rhinorrhea) and nasal itching.    This works fairly quickly after onset and can be used as needed (does not have to be used as a scheduled medication).  Use as directed, up to 2 times daily.    DRY THROAT/MOUTH Remedies:  - Over the counter Lozenges that help with moisture:  [employer] Soothing - sold with cough drops  Xylimelts - on toothpaste aisle at [facility] with dry mouth products or online, these are convenient for when you are talking and or sleeping - they stick to gumline and provide moisture so you won't be so dry overnight. You can even use them during the day when you are singing. They stay in place when you are sleeping, to reduce risk of choking on them  - Biotene mouth wash during the day for dry mouth  - Increase your water intake to 64-80 oz daily to help thin mucus  - Can consider humidifier while you sleep       LARYNGOPHARYNGEAL REFLUX  (SILENT OR ATYPICAL REFLUX)    LPR is a very challenging disease as it includes a vast range of symptoms and difficult to diagnose.      SYMPTOMS  If you have any of the following symptoms you MAY have laryngopharyngeal reflux (LPR):    1. Hoarseness  2. Sore throat  4. throat clearing, sometimes clearing thick mucous  5. chronic cough, especially cough that wakes you up from sleep  6.  "postnasal drip" without the need to blow your nose  7. Globus sensation, like the sensation of something being stuck in the throat  8.  Red, swollen or irritated larynx (voice box)    HOW  Many people with LPR do not have symptoms of heartburn. Compared to the esophagus, the voice box and the back of the throat are significantly more sensitive to the effects of acid and digestive enzymes on surrounding tissue. Acid passing quickly through the esophagus does not have a chance to irritate the area for too long.  However acid that pools in the throat or " voice box can cause prolonged irritation resulting in the symptoms of LPR.    DIAGNOSIS  A common procedure performed by an ENT is called flexible laryngoscopy.  A thin tube is inserted through the nose in order to visualize the larynx (the voice box). This method can detect abnormalities in the voice box ranging from polyps to laryngeal cancer.  This can also reveal signs of LPR, but is not 100% reliable.      If symptoms persist despite medical treatment listed below, further testing is needed.  Three commonly used tests are: a swallowing study; a direct look at the stomach and esophagus through an endoscope, and; an esophageal pH test.  Further testing is usually coordinated with a gastroenterologist.     TREATMENT  Lifestyle changes  1. Do not smoke.  Smoking will worsen reflux.    2. Avoid eating at least 2-3 hours prior to bedtime.  Try to avoid very large meals at night.    4. Weight loss.  For patient's with recent weight gain, shedding a few pounds is all that is required to improve reflux.    5. Avoid reflux triggers. These can worsen acid in the stomach or even cause the esophagus muscles to relax: caffeine, soft drinks, acidic foods (tomato, lots of fruit) mints, alcoholic beverages, particularly at night, cheese, fried foods, spicy foods, eggs, and chocolate.    6. Sleep with the head of bed elevated at least 6 inches.    Consider reading the book Dropping Acid by Shay Vergara MD.  This has information to help choose meals with less acid.     Other Treatments:  All natural remedies include Reflux Gourmet (and Reflux Raft) which create a temporary protective barrier in your stomach to prevent reflux into your esophagus. This can be an effective therapy without the side effects of normal acid reducing medications and was also developed by laryngologists (LPR specialists).   Speech Therapy: education and techniques aimed at helping you control the cough     Medications  Take over-the-counter (OTC)  medications, including antacids, such as Gaviscon Advance (others include Tums®, Maalox®, or Mylanta) as needed  Prescription and OTC stomach acid reducers: H2 blockers such as famotidine (Pepcid®), cimetidine (Tagamet®), ranitidine (Zantac®) OR proton pump inhibitors (PPIs), such as omeprazole (Prilosec®), pantoprazole (Protonix®), and esomeprazole (Nexium®).  Most patients will begin to notice some relief in their symptoms about 2-4 weeks after starting an acid reducing medication; however it is generally recommended the medication should be continued for at least 2 months. If the symptoms completely resolve, the medication can then be tapered.  Some people will remain symptom free while others may have relapses which required treatment again.

## 2025-01-15 NOTE — PROCEDURES
"Nasal/sinus endoscopy    Date/Time: 1/15/2025 3:30 PM    Time out: Immediately prior to procedure a "time out" was called to verify the correct patient, procedure, equipment, support staff and site/side marked as required.    Performed by: Don Brantley PA-C  Authorized by: Don Brantley PA-C    Consent Done?:  Yes (Verbal)  Anesthesia:     Local anesthetic:  Lidocaine 2% and Romero-Synephrine 1/2%    Type of Endoscope:  Flexible    Patient tolerance:  Patient tolerated the procedure well with no immediate complications  Nose:     Procedure Performed:  Nasal Endoscopy  External:      No external nasal deformity  Intranasal:      Mucosa no polyps     Mucosa ulcers not present     No mucosa lesions present     Enlarged turbinates     Septum gross deformity (mild L deviation)  Nasopharynx:      Adenoids present     Posterior choanae patent     Eustachian tube patent     Enlarged MT with pallor and clear mucus  BL clear stringy mucus    "

## 2025-01-21 LAB
A ALTERNATA IGE QN: 0.87 KU/L
A FUMIGATUS IGE QN: 2.43 KU/L
BERMUDA GRASS IGE QN: <0.1 KU/L
CAT DANDER IGE QN: <0.1 KU/L
CEDAR IGE QN: <0.1 KU/L
D FARINAE IGE QN: 0.34 KU/L
D PTERONYSS IGE QN: 0.56 KU/L
DEPRECATED CEDAR IGE RAST QL: NORMAL
DEPRECATED S PNEUM12 IGG SER-MCNC: <0.3 UG/ML
DEPRECATED S PNEUM23 IGG SER-MCNC: <0.3 UG/ML
DEPRECATED S PNEUM4 IGG SER-MCNC: <0.3 UG/ML
DEPRECATED S PNEUM8 IGG SER-MCNC: 1 UG/ML
DEPRECATED S PNEUM9 IGG SER-MCNC: <0.3 UG/ML
DEPRECATED TIMOTHY IGE RAST QL: ABNORMAL
DOG DANDER IGE QN: <0.1 KU/L
ELDER IGE QN: <0.1 KU/L
ENGL PLANTAIN IGE QN: <0.1 KU/L
PECAN/HICK TREE IGE QN: <0.1 KU/L
RAST CLASS: ABNORMAL
RAST CLASS: NORMAL
S PN DA SERO 19F IGG SER-MCNC: 3.8 UG/ML
S PNEUM DA 1 IGG SER-MCNC: 14.6 UG/ML
S PNEUM DA 14 IGG SER-MCNC: 5.7
S PNEUM DA 18C IGG SER-MCNC: 0.6
S PNEUM DA 3 IGG SER-MCNC: <0.3 UG/ML
S PNEUM DA 5 IGG SER-MCNC: 1.5 UG/ML
S PNEUM DA 6B IGG SER-MCNC: <0.3 UG/ML
S PNEUM DA 7F IGG SER-MCNC: 1.3 UG/ML
S PNEUM DA 9V IGG SER-MCNC: <0.3 UG/ML
TIMOTHY IGE QN: 0.38 KU/L
WEST RAGWEED IGE QN: 0.2 KU/L
WHITE OAK IGE QN: <0.1 KU/L

## 2025-01-23 ENCOUNTER — PATIENT MESSAGE (OUTPATIENT)
Dept: OTOLARYNGOLOGY | Facility: CLINIC | Age: 35
End: 2025-01-23
Payer: COMMERCIAL

## 2025-01-23 DIAGNOSIS — R76.0 ABNORMAL ANTIBODY TITER: ICD-10-CM

## 2025-01-23 DIAGNOSIS — J31.0 CHRONIC RHINITIS: Primary | ICD-10-CM

## 2025-02-18 ENCOUNTER — PATIENT MESSAGE (OUTPATIENT)
Dept: OBSTETRICS AND GYNECOLOGY | Facility: CLINIC | Age: 35
End: 2025-02-18
Payer: COMMERCIAL

## 2025-03-26 ENCOUNTER — OFFICE VISIT (OUTPATIENT)
Dept: OBSTETRICS AND GYNECOLOGY | Facility: CLINIC | Age: 35
End: 2025-03-26
Payer: COMMERCIAL

## 2025-03-26 VITALS
SYSTOLIC BLOOD PRESSURE: 120 MMHG | BODY MASS INDEX: 41.18 KG/M2 | HEIGHT: 67 IN | DIASTOLIC BLOOD PRESSURE: 68 MMHG | WEIGHT: 262.38 LBS

## 2025-03-26 DIAGNOSIS — E66.9 OBESITY (BMI 30-39.9): ICD-10-CM

## 2025-03-26 DIAGNOSIS — N89.8 VAGINAL DISCHARGE: ICD-10-CM

## 2025-03-26 DIAGNOSIS — R73.03 PREDIABETES: ICD-10-CM

## 2025-03-26 DIAGNOSIS — Z01.419 ENCOUNTER FOR GYNECOLOGICAL EXAMINATION WITHOUT ABNORMAL FINDING: Primary | ICD-10-CM

## 2025-03-26 DIAGNOSIS — Z12.31 VISIT FOR SCREENING MAMMOGRAM: ICD-10-CM

## 2025-03-26 PROCEDURE — 3008F BODY MASS INDEX DOCD: CPT | Mod: CPTII,S$GLB,, | Performed by: OBSTETRICS & GYNECOLOGY

## 2025-03-26 PROCEDURE — 3078F DIAST BP <80 MM HG: CPT | Mod: CPTII,S$GLB,, | Performed by: OBSTETRICS & GYNECOLOGY

## 2025-03-26 PROCEDURE — 99395 PREV VISIT EST AGE 18-39: CPT | Mod: S$GLB,,, | Performed by: OBSTETRICS & GYNECOLOGY

## 2025-03-26 PROCEDURE — 99999 PR PBB SHADOW E&M-EST. PATIENT-LVL III: CPT | Mod: PBBFAC,,, | Performed by: OBSTETRICS & GYNECOLOGY

## 2025-03-26 PROCEDURE — 1159F MED LIST DOCD IN RCRD: CPT | Mod: CPTII,S$GLB,, | Performed by: OBSTETRICS & GYNECOLOGY

## 2025-03-26 PROCEDURE — 3074F SYST BP LT 130 MM HG: CPT | Mod: CPTII,S$GLB,, | Performed by: OBSTETRICS & GYNECOLOGY

## 2025-03-26 PROCEDURE — 1160F RVW MEDS BY RX/DR IN RCRD: CPT | Mod: CPTII,S$GLB,, | Performed by: OBSTETRICS & GYNECOLOGY

## 2025-03-26 NOTE — PROGRESS NOTES
HISTORY OF PRESENT ILLNESS:    Dora Hernandez is a 35 y.o. female, , Patient's last menstrual period was 2025 (exact date).,  presents for a routine exam and has no complaints.  Patient reports cycles occur every 4-5 weeks lasting 6-7 days using 3-4 pads per day.   She denies any BTB.     History of abnormal pap: No  Last Pap:   Last MMG: N/A  Last Colonoscopy: N/A     She uses withdrawal for birth control.   She does not desire STD screening.    The patient participates in regular exercise: no   The patient does not smoke.    The patient wears seatbelts.     Pt denies any domestic violence.    Past Medical History:   Diagnosis Date    Abnormal Pap smear     Anxiety     Depression     Migraine headache     Polycystic disease, ovaries        Past Surgical History:   Procedure Laterality Date    FUSION OF SUBTALAR JOINT Right 2024    Procedure: FUSION, SUBTALAR JOINT;  Surgeon: Surya Akhtar MD;  Location: [facility];  Service: Orthopedics;  Laterality: Right;  Arthrex large cannulated screws   FluoroScan imaging    TYMPANOSTOMY         MEDICATIONS AND ALLERGIES:    Current Medications[1]    Review of patient's allergies indicates:   Allergen Reactions    Metformin Nausea And Vomiting       Family History   Problem Relation Name Age of Onset    Hypertension Father      Stroke Father      Thyroid disease Mother      Lung cancer Maternal Grandmother      Breast cancer Neg Hx      Colon cancer Neg Hx      Ovarian cancer Neg Hx         Social History[2]    COMPREHENSIVE GYN HISTORY:  PAP History: Denies abnormal Paps.  Infection History: Denies STDs. Denies PID.  Benign History: Denies uterine fibroids. Denies ovarian cysts. Denies endometriosis. Denies other conditions.  Cancer History: Denies cervical cancer. Denies uterine cancer or hyperplasia. Denies ovarian cancer. Denies vulvar cancer or pre-cancer. Denies vaginal cancer or pre-cancer. Denies breast cancer. Denies colon  "cancer.  Sexual Activity History: Reports currently being sexually active  Menstrual History: Monthly. Mod then light flow.   Dysmenorrhea History: Reports mild dysmenorrhea.       ROS:  GENERAL: No weight changes. No swelling. No fatigue. No fever.  CARDIOVASCULAR: No chest pain. No shortness of breath. No leg cramps.   NEUROLOGICAL: No headaches. No vision changes.  BREASTS: No pain. No lumps. No discharge.  ABDOMEN: No pain. No nausea. No vomiting. No diarrhea. No constipation.  REPRODUCTIVE: No abnormal bleeding.   VULVA: No pain. No lesions. No itching.  VAGINA: No relaxation. No itching. No odor. No discharge. No lesions.  URINARY: No incontinence. No nocturia. No frequency. No dysuria.    /68 (BP Location: Right arm, Patient Position: Sitting)   Ht 5' 7" (1.702 m)   Wt 119 kg (262 lb 5.6 oz)   LMP 02/24/2025 (Exact Date)   BMI 41.09 kg/m²     PE:  APPEARANCE: Well nourished, well developed, in no acute distress.  AFFECT: WNL, alert and oriented x 3.  SKIN: No acne or hirsutism.  NECK: Neck symmetric, without masses or thyromegaly.  NODES: No inguinal, cervical, axillary or femoral lymph node enlargement.  CHEST: Good respiratory effort.   ABDOMEN: Soft. No tenderness or masses. No hepatosplenomegaly. No hernias.  BREASTS: Symmetrical, no skin changes, visible lesions, palpable masses or nipple discharge bilaterally.  PELVIC: External female genitalia without lesions.  Female hair distribution. Adequate perineal body, Normal urethral meatus. Vagina moist and well rugated without lesions or discharge.  No significant cystocele or rectocele present. Cervix pink without lesions, discharge or tenderness. Uterus is 4-6 week size, regular, mobile and nontender. Adnexa without masses or tenderness.  EXTREMITIES: No edema    DIAGNOSIS:  1. Encounter for gynecological examination without abnormal finding    2. Prediabetes    3. Obesity (BMI 30-39.9)    4. Visit for screening mammogram    5. Vaginal discharge  "     PLAN:    Orders Placed This Encounter    Mammo Digital Screening Bilat w/ Chip (XPD)    C. trachomatis/N. gonorrhoeae by AMP DNA [facility]; Cervicovaginal    Vaginosis Screen by DNA Probe    Liquid-Based Pap Smear, Screening       COUNSELING:  The patient was counseled today on:  -A.C.S. Pap and pelvic exam guidelines (pap every 3 years), recomendations for yearly mammogram;  -to follow up with her PCP for other health maintenance.    FOLLOW-UP with me annually.          [1]   Current Outpatient Medications:     ibuprofen (ADVIL,MOTRIN) 800 MG tablet, Take 1 tablet (800 mg total) by mouth 2 (two) times daily as needed for Pain., Disp: 60 tablet, Rfl: 2    triamcinolone (NASACORT) 55 mcg nasal inhaler, 2 sprays by Nasal route once daily., Disp: 6.7 mL, Rfl: 0  [2]   Social History  Socioeconomic History    Marital status:    Tobacco Use    Smoking status: Never     Passive exposure: Never    Smokeless tobacco: Never   Substance and Sexual Activity    Alcohol use: Yes    Drug use: No    Sexual activity: Yes     Partners: Male     Birth control/protection: None     Social Drivers of Health     Financial Resource Strain: Low Risk  (5/8/2024)    Overall Financial Resource Strain (CARDIA)     Difficulty of Paying Living Expenses: Not very hard   Food Insecurity: Food Insecurity Present (5/8/2024)    Hunger Vital Sign     Worried About Running Out of Food in the Last Year: Often true     Ran Out of Food in the Last Year: Sometimes true   Transportation Needs: No Transportation Needs (5/8/2024)    PRAPARE - Transportation     Lack of Transportation (Medical): No     Lack of Transportation (Non-Medical): No   Physical Activity: Unknown (5/8/2024)    Exercise Vital Sign     Minutes of Exercise per Session: 0 min   Stress: Stress Concern Present (5/8/2024)    [employer] [place] [employer] - Occupational Stress Questionnaire     Feeling of Stress : To some extent   Housing Stability: High Risk (5/8/2024)     Housing Stability Vital Sign     Unable to Pay for Housing in the Last Year: Yes

## 2025-03-27 ENCOUNTER — HOSPITAL ENCOUNTER (OUTPATIENT)
Dept: RADIOLOGY | Facility: HOSPITAL | Age: 35
Discharge: HOME | End: 2025-03-27
Attending: OBSTETRICS & GYNECOLOGY
Payer: COMMERCIAL

## 2025-03-27 VITALS — HEIGHT: 67 IN | BODY MASS INDEX: 41.12 KG/M2 | WEIGHT: 262 LBS

## 2025-03-27 DIAGNOSIS — Z12.31 VISIT FOR SCREENING MAMMOGRAM: ICD-10-CM

## 2025-03-27 PROCEDURE — 77063 BREAST TOMOSYNTHESIS BI: CPT | Mod: 26,,, | Performed by: RADIOLOGY

## 2025-03-27 PROCEDURE — 77067 SCR MAMMO BI INCL CAD: CPT | Mod: 26,,, | Performed by: RADIOLOGY

## 2025-03-27 PROCEDURE — 77063 BREAST TOMOSYNTHESIS BI: CPT | Mod: TC

## 2025-04-25 ENCOUNTER — PATIENT MESSAGE (OUTPATIENT)
Dept: INTERNAL MEDICINE | Facility: CLINIC | Age: 35
End: 2025-04-25

## 2025-04-25 ENCOUNTER — OFFICE VISIT (OUTPATIENT)
Dept: INTERNAL MEDICINE | Facility: CLINIC | Age: 35
End: 2025-04-25
Payer: COMMERCIAL

## 2025-04-25 DIAGNOSIS — R73.03 PREDIABETES: Primary | ICD-10-CM

## 2025-04-25 DIAGNOSIS — E55.9 VITAMIN D INSUFFICIENCY: ICD-10-CM

## 2025-04-25 DIAGNOSIS — Z86.39 HISTORY OF IRON DEFICIENCY: ICD-10-CM

## 2025-04-25 DIAGNOSIS — E28.2 PCOS (POLYCYSTIC OVARIAN SYNDROME): ICD-10-CM

## 2025-04-25 DIAGNOSIS — E66.9 OBESITY (BMI 30-39.9): ICD-10-CM

## 2025-04-25 DIAGNOSIS — Z00.00 ANNUAL PHYSICAL EXAM: Primary | ICD-10-CM

## 2025-04-25 DIAGNOSIS — R73.03 PREDIABETES: ICD-10-CM

## 2025-04-25 NOTE — PROGRESS NOTES
Primary Care Telemedicine Note    The patient location is:  Home     The chief complaint leading to consultation is: Pre-diabetes. Obesity    Total time spent with patient: 20 min    Visit type: Virtual visit with synchronous audio only and video      Each patient to whom he or she provides medical services by telemedicine is:  (1) informed of the relationship between the physician and patient and the respective role of any other health care provider with respect to management of the patient; and (2) notified that he or she may decline to receive medical services by telemedicine and may withdraw from such care at any time.        Subjective:        Patient ID: Dora Hernandez is a 35 y.o. female.    Chief Complaint: Pre-diabetes and Obesity      HPI    Pre-diabetes: Patient denies having a family history of diabetes. States that her weight remains elevated despite eating better and exercising.  She has never taken medications to reduce the risk for progression to diabetes.  She denies excessive urination, no excessive thirst and no excessive hunger.     Lab Results   Component Value Date    HGBA1C 5.7 (H) 05/14/2024    GLU 87 06/03/2024       Obesity:  Wt Readings from Last 3 Encounters:   03/27/25 118.8 kg (262 lb)   03/26/25 119 kg (262 lb 5.6 oz)   01/15/25 120.4 kg (265 lb 6.9 oz)   - tried intermittent fasting and worked out at gym with results in past  - craving occur close to her menstrual cycle--> craves red meat and salt  - she reports feeling much more motivated to change her diet and start exercising  - BF: Chick- suhas-A, large lemonade sugar free , spicy chicken yogurt parfait  - L: fried chicken and shrimp fried rice, ate 75% of meal  - Tonight for D: crab legs and corn on the wal[place]   - Snacks: Apple or grapes,  popcorn, candy early evening        Review of Systems   Constitutional:  Positive for fatigue and unexpected weight change. Negative for chills, diaphoresis and fever.   HENT:  Negative  for congestion, ear pain, postnasal drip, rhinorrhea and sinus pressure.    Respiratory:  Negative for cough and shortness of breath.    Cardiovascular:  Negative for chest pain, palpitations and leg swelling.   Gastrointestinal:  Negative for abdominal pain, constipation, diarrhea, nausea and vomiting.   Endocrine: Negative for cold intolerance and heat intolerance.   Musculoskeletal:  Negative for arthralgias and myalgias.   Skin:  Negative for rash and wound.   Allergic/Immunologic: Positive for environmental allergies (seasonal).   Neurological:  Negative for dizziness, tremors and headaches.   Psychiatric/Behavioral:  Positive for sleep disturbance (wakes up at 4am and has trouble going back to sleep, she only feels rested when she takes Benadryl). Negative for dysphoric mood. The patient is nervous/anxious (she reports that she enjoys work but it is stressful at times).         She reports lots of stress managing family, work and her business           Objective:        Physical Exam  Constitutional:       General: She is not in acute distress.     Appearance: Normal appearance. She is well-developed. She is not toxic-appearing or diaphoretic.   HENT:      Head: Normocephalic and atraumatic.      Right Ear: Hearing normal.      Left Ear: Hearing normal.      Nose: No congestion.   Eyes:      General: Lids are normal.   Pulmonary:      Effort: No tachypnea or respiratory distress.   Musculoskeletal:      Cervical back: Normal range of motion.   Neurological:      Mental Status: She is alert and oriented to person, place, and time.      Comments: Alert and oriented   Psychiatric:         Attention and Perception: Attention normal.         Mood and Affect: Mood normal.         Behavior: Behavior is cooperative.               Assessment:       1. Prediabetes    2. Obesity (BMI 30-39.9)              Plan:     1. Prediabetes  - will check labs when she comes in for annual exam  - anticipate seeing the effects of her  high-sugar diet on HbA1c    2. Obesity (BMI 30-39.9)  - advised patient to look for substitutes that can help her reduce sugar intake, start walking now, consider monitoring total steps per day    RTC in 2 months for follow-up or sooner if needed  __________________________    Janna Centeno MD, PharmD  [facility]  American Board of Obesity Medicine diplomate  Office [phone]

## 2025-04-28 ENCOUNTER — APPOINTMENT (OUTPATIENT)
Dept: LAB | Facility: HOSPITAL | Age: 35
End: 2025-04-28
Attending: INTERNAL MEDICINE
Payer: COMMERCIAL

## 2025-04-28 DIAGNOSIS — E28.2 PCOS (POLYCYSTIC OVARIAN SYNDROME): ICD-10-CM

## 2025-04-28 DIAGNOSIS — R73.03 PREDIABETES: ICD-10-CM

## 2025-04-28 DIAGNOSIS — E55.9 VITAMIN D INSUFFICIENCY: ICD-10-CM

## 2025-04-28 DIAGNOSIS — Z00.00 ANNUAL PHYSICAL EXAM: ICD-10-CM

## 2025-04-28 DIAGNOSIS — Z86.39 HISTORY OF IRON DEFICIENCY: ICD-10-CM

## 2025-04-28 LAB
25(OH)D3+25(OH)D2 SERPL-MCNC: 20 NG/ML (ref 30–96)
ABSOLUTE EOSINOPHIL (OHS): 0.17 K/UL
ABSOLUTE MONOCYTE (OHS): 0.28 K/UL (ref 0.3–1)
ABSOLUTE NEUTROPHIL COUNT (OHS): 2.68 K/UL (ref 1.8–7.7)
ALBUMIN SERPL BCP-MCNC: 3.3 G/DL (ref 3.5–5.2)
ALP SERPL-CCNC: 80 UNIT/L (ref 40–150)
ALT SERPL W/O P-5'-P-CCNC: 14 UNIT/L (ref 10–44)
ANION GAP (OHS): 11 MMOL/L (ref 8–16)
AST SERPL-CCNC: 20 UNIT/L (ref 11–45)
BACTERIA #/AREA URNS AUTO: NORMAL /HPF
BASOPHILS # BLD AUTO: 0.05 K/UL
BASOPHILS NFR BLD AUTO: 1 %
BILIRUB SERPL-MCNC: 0.3 MG/DL (ref 0.1–1)
BILIRUB UR QL STRIP.AUTO: NEGATIVE
BUN SERPL-MCNC: 8 MG/DL (ref 6–20)
CALCIUM SERPL-MCNC: 8.8 MG/DL (ref 8.7–10.5)
CHLORIDE SERPL-SCNC: 105 MMOL/L (ref 95–110)
CHOLEST SERPL-MCNC: 155 MG/DL (ref 120–199)
CHOLEST/HDLC SERPL: 3.4 {RATIO} (ref 2–5)
CLARITY UR: CLEAR
CO2 SERPL-SCNC: 20 MMOL/L (ref 23–29)
COLOR UR AUTO: YELLOW
CREAT SERPL-MCNC: 0.7 MG/DL (ref 0.5–1.4)
EAG (OHS): 128 MG/DL (ref 68–131)
ERYTHROCYTE [DISTWIDTH] IN BLOOD BY AUTOMATED COUNT: 14.8 % (ref 11.5–14.5)
FERRITIN SERPL-MCNC: 38 NG/ML (ref 20–300)
GFR SERPLBLD CREATININE-BSD FMLA CKD-EPI: >60 ML/MIN/1.73/M2
GLUCOSE SERPL-MCNC: 70 MG/DL (ref 70–110)
GLUCOSE UR QL STRIP: NEGATIVE
HBA1C MFR BLD: 6.1 % (ref 4–5.6)
HCT VFR BLD AUTO: 39 % (ref 37–48.5)
HDLC SERPL-MCNC: 46 MG/DL (ref 40–75)
HDLC SERPL: 29.7 % (ref 20–50)
HGB BLD-MCNC: 12.1 GM/DL (ref 12–16)
HGB UR QL STRIP: ABNORMAL
IMM GRANULOCYTES # BLD AUTO: 0.01 K/UL (ref 0–0.04)
IMM GRANULOCYTES NFR BLD AUTO: 0.2 % (ref 0–0.5)
IRON SATN MFR SERPL: 12 % (ref 20–50)
IRON SERPL-MCNC: 43 UG/DL (ref 30–160)
KETONES UR QL STRIP: NEGATIVE
LDLC SERPL CALC-MCNC: 98.2 MG/DL (ref 63–159)
LEUKOCYTE ESTERASE UR QL STRIP: NEGATIVE
LYMPHOCYTES # BLD AUTO: 1.75 K/UL (ref 1–4.8)
MCH RBC QN AUTO: 26.6 PG (ref 27–31)
MCHC RBC AUTO-ENTMCNC: 31 G/DL (ref 32–36)
MCV RBC AUTO: 86 FL (ref 82–98)
MICROSCOPIC COMMENT: NORMAL
NITRITE UR QL STRIP: NEGATIVE
NONHDLC SERPL-MCNC: 109 MG/DL
NUCLEATED RBC (/100WBC) (OHS): 0 /100 WBC
PH UR STRIP: 6 [PH]
PLATELET # BLD AUTO: 421 K/UL (ref 150–450)
PMV BLD AUTO: 9.3 FL (ref 9.2–12.9)
POTASSIUM SERPL-SCNC: 3.8 MMOL/L (ref 3.5–5.1)
PROT SERPL-MCNC: 7.7 GM/DL (ref 6–8.4)
PROT UR QL STRIP: NEGATIVE
RBC # BLD AUTO: 4.55 M/UL (ref 4–5.4)
RBC #/AREA URNS AUTO: 1 /HPF (ref 0–4)
RELATIVE EOSINOPHIL (OHS): 3.4 %
RELATIVE LYMPHOCYTE (OHS): 35.4 % (ref 18–48)
RELATIVE MONOCYTE (OHS): 5.7 % (ref 4–15)
RELATIVE NEUTROPHIL (OHS): 54.3 % (ref 38–73)
RETICS/RBC NFR AUTO: 1.5 % (ref 0.5–2.5)
SODIUM SERPL-SCNC: 136 MMOL/L (ref 136–145)
SP GR UR STRIP: 1.02
SQUAMOUS #/AREA URNS AUTO: 1 /HPF
TIBC SERPL-MCNC: 352 UG/DL (ref 250–450)
TRANSFERRIN SERPL-MCNC: 238 MG/DL (ref 200–375)
TRIGL SERPL-MCNC: 54 MG/DL (ref 30–150)
TSH SERPL-ACNC: 0.86 UIU/ML (ref 0.4–4)
UROBILINOGEN UR STRIP-ACNC: NEGATIVE EU/DL
WBC # BLD AUTO: 4.94 K/UL (ref 3.9–12.7)
WBC #/AREA URNS AUTO: 1 /HPF (ref 0–5)

## 2025-04-28 PROCEDURE — 82728 ASSAY OF FERRITIN: CPT

## 2025-04-28 PROCEDURE — 80061 LIPID PANEL: CPT

## 2025-04-28 PROCEDURE — 85045 AUTOMATED RETICULOCYTE COUNT: CPT

## 2025-04-28 PROCEDURE — 83036 HEMOGLOBIN GLYCOSYLATED A1C: CPT

## 2025-04-28 PROCEDURE — 85025 COMPLETE CBC W/AUTO DIFF WBC: CPT

## 2025-04-28 PROCEDURE — 82306 VITAMIN D 25 HYDROXY: CPT

## 2025-04-28 PROCEDURE — 83525 ASSAY OF INSULIN: CPT

## 2025-04-28 PROCEDURE — 83540 ASSAY OF IRON: CPT

## 2025-04-28 PROCEDURE — 36415 COLL VENOUS BLD VENIPUNCTURE: CPT | Mod: PN

## 2025-04-28 PROCEDURE — 84443 ASSAY THYROID STIM HORMONE: CPT

## 2025-04-28 PROCEDURE — 80053 COMPREHEN METABOLIC PANEL: CPT

## 2025-04-28 PROCEDURE — 81001 URINALYSIS AUTO W/SCOPE: CPT

## 2025-04-28 RX ORDER — ACETAMINOPHEN 500 MG
2000 TABLET ORAL DAILY
COMMUNITY

## 2025-04-29 LAB — INSULIN SERPL-ACNC: 13.7 UU/ML

## 2025-06-12 ENCOUNTER — LAB VISIT (OUTPATIENT)
Dept: LAB | Facility: HOSPITAL | Age: 35
End: 2025-06-12
Attending: INTERNAL MEDICINE
Payer: COMMERCIAL

## 2025-06-12 ENCOUNTER — OFFICE VISIT (OUTPATIENT)
Dept: INTERNAL MEDICINE | Facility: CLINIC | Age: 35
End: 2025-06-12
Payer: COMMERCIAL

## 2025-06-12 VITALS
DIASTOLIC BLOOD PRESSURE: 84 MMHG | BODY MASS INDEX: 41.52 KG/M2 | SYSTOLIC BLOOD PRESSURE: 112 MMHG | WEIGHT: 264.56 LBS | HEIGHT: 67 IN | HEART RATE: 75 BPM | RESPIRATION RATE: 17 BRPM | TEMPERATURE: 98 F | OXYGEN SATURATION: 97 %

## 2025-06-12 DIAGNOSIS — R42 LIGHTHEADEDNESS: ICD-10-CM

## 2025-06-12 DIAGNOSIS — Z00.00 ANNUAL PHYSICAL EXAM: Primary | ICD-10-CM

## 2025-06-12 DIAGNOSIS — F32.81 PREMENSTRUAL DYSPHORIC DISORDER: ICD-10-CM

## 2025-06-12 DIAGNOSIS — R73.03 PREDIABETES: ICD-10-CM

## 2025-06-12 DIAGNOSIS — R55 RECURRENT SYNCOPE: ICD-10-CM

## 2025-06-12 DIAGNOSIS — E66.01 MORBID OBESITY WITH BMI OF 40.0-44.9, ADULT: ICD-10-CM

## 2025-06-12 DIAGNOSIS — Z86.39 HISTORY OF IRON DEFICIENCY: ICD-10-CM

## 2025-06-12 DIAGNOSIS — E28.2 PCOS (POLYCYSTIC OVARIAN SYNDROME): ICD-10-CM

## 2025-06-12 DIAGNOSIS — E55.9 VITAMIN D INSUFFICIENCY: ICD-10-CM

## 2025-06-12 LAB
ABSOLUTE EOSINOPHIL (OHS): 0.24 K/UL
ABSOLUTE MONOCYTE (OHS): 0.43 K/UL (ref 0.3–1)
ABSOLUTE NEUTROPHIL COUNT (OHS): 3.36 K/UL (ref 1.8–7.7)
BASOPHILS # BLD AUTO: 0.05 K/UL
BASOPHILS NFR BLD AUTO: 0.8 %
ERYTHROCYTE [DISTWIDTH] IN BLOOD BY AUTOMATED COUNT: 15 % (ref 11.5–14.5)
FERRITIN SERPL-MCNC: 28 NG/ML (ref 20–300)
HCT VFR BLD AUTO: 39.6 % (ref 37–48.5)
HGB BLD-MCNC: 12.4 GM/DL (ref 12–16)
IMM GRANULOCYTES # BLD AUTO: 0.02 K/UL (ref 0–0.04)
IMM GRANULOCYTES NFR BLD AUTO: 0.3 % (ref 0–0.5)
IRON SATN MFR SERPL: 9 % (ref 20–50)
IRON SERPL-MCNC: 33 UG/DL (ref 30–160)
LYMPHOCYTES # BLD AUTO: 1.82 K/UL (ref 1–4.8)
MCH RBC QN AUTO: 26.8 PG (ref 27–31)
MCHC RBC AUTO-ENTMCNC: 31.3 G/DL (ref 32–36)
MCV RBC AUTO: 86 FL (ref 82–98)
NUCLEATED RBC (/100WBC) (OHS): 0 /100 WBC
OHS QRS DURATION: 78 MS
OHS QTC CALCULATION: 415 MS
PLATELET # BLD AUTO: 413 K/UL (ref 150–450)
PMV BLD AUTO: 9 FL (ref 9.2–12.9)
RBC # BLD AUTO: 4.63 M/UL (ref 4–5.4)
RELATIVE EOSINOPHIL (OHS): 4.1 %
RELATIVE LYMPHOCYTE (OHS): 30.7 % (ref 18–48)
RELATIVE MONOCYTE (OHS): 7.3 % (ref 4–15)
RELATIVE NEUTROPHIL (OHS): 56.8 % (ref 38–73)
TIBC SERPL-MCNC: 354 UG/DL (ref 250–450)
TRANSFERRIN SERPL-MCNC: 239 MG/DL (ref 200–375)
WBC # BLD AUTO: 5.92 K/UL (ref 3.9–12.7)

## 2025-06-12 PROCEDURE — 3008F BODY MASS INDEX DOCD: CPT | Mod: CPTII,S$GLB,, | Performed by: INTERNAL MEDICINE

## 2025-06-12 PROCEDURE — 93005 ELECTROCARDIOGRAM TRACING: CPT | Mod: S$GLB,,, | Performed by: INTERNAL MEDICINE

## 2025-06-12 PROCEDURE — 99999 PR PBB SHADOW E&M-EST. PATIENT-LVL IV: CPT | Mod: PBBFAC,,, | Performed by: INTERNAL MEDICINE

## 2025-06-12 PROCEDURE — 3079F DIAST BP 80-89 MM HG: CPT | Mod: CPTII,S$GLB,, | Performed by: INTERNAL MEDICINE

## 2025-06-12 PROCEDURE — 99395 PREV VISIT EST AGE 18-39: CPT | Mod: S$GLB,,, | Performed by: INTERNAL MEDICINE

## 2025-06-12 PROCEDURE — 85025 COMPLETE CBC W/AUTO DIFF WBC: CPT

## 2025-06-12 PROCEDURE — 3044F HG A1C LEVEL LT 7.0%: CPT | Mod: CPTII,S$GLB,, | Performed by: INTERNAL MEDICINE

## 2025-06-12 PROCEDURE — 1160F RVW MEDS BY RX/DR IN RCRD: CPT | Mod: CPTII,S$GLB,, | Performed by: INTERNAL MEDICINE

## 2025-06-12 PROCEDURE — 83540 ASSAY OF IRON: CPT

## 2025-06-12 PROCEDURE — 36415 COLL VENOUS BLD VENIPUNCTURE: CPT | Mod: PO

## 2025-06-12 PROCEDURE — 93010 ELECTROCARDIOGRAM REPORT: CPT | Mod: S$GLB,,, | Performed by: INTERNAL MEDICINE

## 2025-06-12 PROCEDURE — 3074F SYST BP LT 130 MM HG: CPT | Mod: CPTII,S$GLB,, | Performed by: INTERNAL MEDICINE

## 2025-06-12 PROCEDURE — 82728 ASSAY OF FERRITIN: CPT

## 2025-06-12 PROCEDURE — 1159F MED LIST DOCD IN RCRD: CPT | Mod: CPTII,S$GLB,, | Performed by: INTERNAL MEDICINE

## 2025-06-12 RX ORDER — ERGOCALCIFEROL 1.25 MG/1
50000 CAPSULE ORAL
Qty: 12 CAPSULE | Refills: 3 | Status: SHIPPED | OUTPATIENT
Start: 2025-06-12

## 2025-06-12 NOTE — PROGRESS NOTES
Subjective:     PCP: Janna Centeno MD    Dora Hernandez is a 35 y.o. female who presents for an annual exam.    Dora reports experiencing near-fainting episodes, with three occurrences during an outdoor event last weekend. Symptoms include feeling excessively hot, dry mouth, and sudden onset of weakness without dizziness. The episodes typically occur after being outside for approximately 15 minutes. A similar episode was reported in September 2019 with loss of consciousness. She expresses concern about these episodes due to her work as a  which involves outdoor events.    She reports changes in menstrual pattern over the last 2-3 months with new onset of clotting and cramping throughout her 4-5 day menses. She has headaches and malaise which she associates with use of the tampons. But, symptoms seem to resolve when she uses pads.  She has used the same tampon brand since adolescence without previous issues.    She reports ankle issues affecting hip alignment and posture. Her hip feels tight, particularly when not performing morning stretches. She takes ibuprofen as needed for ankle pain during events.    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    Her father has history of strokes, seizures, and heart problems within the last three years. Her mother has thyroid disease.    She works as a  and reports moderate stress levels due to starting a new job and balancing multiple responsibilities including three jobs and home life.      Partial empty sella on past head imaging.    Medical History:   Past Medical History:   Diagnosis Date    Abnormal Pap smear     Anxiety     Depression     Migraine headache     Polycystic disease, ovaries        Family History: family history includes Hypertension in her father; Lung cancer in her maternal grandmother; Seizures in her father; Stroke in her father; Thyroid disease in her  mother.    Surgical History:   Past Surgical History:   Procedure Laterality Date    FUSION OF SUBTALAR JOINT Right 6/26/2024    Procedure: FUSION, SUBTALAR JOINT;  Surgeon: Surya Akhtar MD;  Location: [facility];  Service: Orthopedics;  Laterality: Right;  Arthrex large cannulated screws   FluoroScan imaging    TYMPANOSTOMY          Social History:  reports that she has never smoked. She has never been exposed to tobacco smoke. She has never used smokeless tobacco. She reports current alcohol use. She reports that she does not use drugs.     Allergies:   Review of patient's allergies indicates:   Allergen Reactions    Metformin Nausea And Vomiting       Medications:   Current Outpatient Medications   Medication Sig    ergocalciferol (ERGOCALCIFEROL) 50,000 unit Cap Take 1 capsule (50,000 Units total) by mouth every 7 days.    ibuprofen (ADVIL,MOTRIN) 800 MG tablet Take 1 tablet (800 mg total) by mouth 2 (two) times daily as needed for Pain. (Patient not taking: Reported on 6/12/2025)     No current facility-administered medications for this visit.       Health Maintenance:   Health Maintenance Topics with due status: Not Due       Topic Last Completion Date    TETANUS VACCINE 01/28/2020    Influenza Vaccine 11/03/2023    Cervical Cancer Screening 03/26/2025    Hemoglobin A1c (Prediabetes) 04/28/2025    RSV Vaccine (Age 60+ and Pregnant patients) Not Due     Lab Results   Component Value Date    HEPCAB Non-reactive 06/03/2024    [id number] Non-reactive 06/03/2024     Eye Exam:   due  Dental Exam: not recently  OB/GYN: Dr. Butler  Pap smear: 4/3/2025  HIV: 6/2024, neg  Hepatitis C  Ab: 6/2024, neg    Vaccinations:  Immunization History   Administered Date(s) Administered    COVID-19 MRNA, LN-S PF (MODERNA HALF 0.25 ML DOSE) 01/02/2022    COVID-19, MRNA, LN-S, PF (MODERNA FULL 0.5 ML DOSE) 03/10/2021, 04/07/2021    MMR 10/08/1994, 02/26/2020    Meningococcal Conjugate (MCV4P) 01/23/2008    Pneumococcal Conjugate -  20 Valent 03/27/2025    Tdap 01/28/2020     Influenza: not done  Tetanus: 2020  Covid vaccine: boosted x1    Body mass index is 41.43 kg/m².  Wt Readings from Last 3 Encounters:   06/12/25 120 kg (264 lb 8.8 oz)   03/27/25 118.8 kg (262 lb)   03/26/25 119 kg (262 lb 5.6 oz)       Review of Systems   Constitutional:  Positive for fatigue. Negative for chills, diaphoresis and fever.   HENT:  Negative for congestion, dental problem, ear discharge, ear pain, postnasal drip, rhinorrhea, sinus pressure, sore throat and trouble swallowing.         Dry mouth   Eyes:  Negative for redness and visual disturbance.   Respiratory:  Negative for cough, chest tightness and shortness of breath.    Cardiovascular:  Negative for chest pain, palpitations and leg swelling.   Gastrointestinal:  Negative for abdominal pain, blood in stool, constipation, diarrhea, nausea and vomiting.   Endocrine: Negative for cold intolerance and heat intolerance.   Genitourinary:  Positive for menstrual problem (now has menstrual cramps throughout the periods, clots, feel sick and HA with tampons and feels better without, change pads 3-4 times daily) and pelvic pain (excessive cramping with periods). Negative for decreased urine volume, dysuria, frequency and hematuria.   Musculoskeletal:  Positive for arthralgias (ankle pain, stopped PT on her own). Negative for back pain and myalgias.   Skin:  Positive for rash. Negative for wound.   Neurological:  Positive for dizziness, syncope, weakness (generalized during episodes) and headaches. Negative for speech difficulty and numbness.   Hematological:  Negative for adenopathy.   Psychiatric/Behavioral:  Negative for dysphoric mood and sleep disturbance. The patient is nervous/anxious.           Objective:     Physical Exam  Vitals reviewed.   Constitutional:       General: She is awake. She is not in acute distress.     Appearance: Normal appearance. She is well-developed and well-groomed. She is not  diaphoretic.   HENT:      Head: Normocephalic and atraumatic.      Right Ear: Hearing, tympanic membrane, ear canal and external ear normal. Tympanic membrane is not erythematous or bulging.      Left Ear: Hearing, tympanic membrane, ear canal and external ear normal. Tympanic membrane is not erythematous or bulging.      Nose: Nose normal. No congestion.      Mouth/Throat:      Mouth: Mucous membranes are moist.      Tongue: No lesions.      Pharynx: Oropharynx is clear. Uvula midline. No oropharyngeal exudate or posterior oropharyngeal erythema.   Eyes:      General: Lids are normal. Vision grossly intact. Gaze aligned appropriately. No scleral icterus.     Conjunctiva/sclera:      Right eye: Right conjunctiva is not injected.      Left eye: Left conjunctiva is not injected.      Pupils: Pupils are equal, round, and reactive to light.   Neck:      Thyroid: No thyroid mass or thyromegaly.   Cardiovascular:      Rate and Rhythm: Normal rate and regular rhythm.      Pulses: Normal pulses.      Heart sounds: Normal heart sounds. No murmur heard.  Pulmonary:      Effort: Pulmonary effort is normal. No respiratory distress.      Breath sounds: Normal breath sounds. No decreased breath sounds or wheezing.   Abdominal:      General: Bowel sounds are normal. There is no distension.      Palpations: Abdomen is soft. Abdomen is not rigid.      Tenderness: There is no abdominal tenderness. There is no guarding or rebound.   Musculoskeletal:         General: Normal range of motion.      Cervical back: Normal range of motion and neck supple.      Right lower leg: No edema.      Left lower leg: No edema.      Right ankle: No swelling. No tenderness. Normal range of motion.      Left ankle: No swelling. No tenderness. Normal range of motion.   Lymphadenopathy:      Cervical: No cervical adenopathy.      Upper Body:      Right upper body: No supraclavicular adenopathy.      Left upper body: No supraclavicular adenopathy.   Skin:    "  General: Skin is warm and dry.      Coloration: Skin is not cyanotic.      Findings: No lesion or rash.      Nails: There is no clubbing.   Neurological:      General: No focal deficit present.      Mental Status: She is alert and oriented to person, place, and time.      Sensory: Sensation is intact.      Coordination: Coordination is intact.      Gait: Gait is intact.      Deep Tendon Reflexes: Reflexes are normal and symmetric.   Psychiatric:         Attention and Perception: Attention normal.         Mood and Affect: Mood normal.         Behavior: Behavior is cooperative.              Assessment:        1. Annual physical exam    2. Lightheadedness    3. Recurrent syncope    4. PCOS (polycystic ovarian syndrome)    5. Prediabetes    6. Premenstrual dysphoric disorder    7. Vitamin D insufficiency    8. History of iron deficiency    9. Morbid obesity with BMI of 40.0-44.9, adult           Plan:       Assessment & Plan    - Assessed recent episodes of near-fainting, considering potential cardiac and neurological causes.  - Evaluated iron levels and vitamin B status, noting stored iron (ferritin) was normal but serum iron was low.  - Assessed recent changes in menstrual cycle, including new onset of clotting and cramps.  - Evaluated vitamin D levels, noting improvement but still low.  - Assessed for thyroid issues due to family history.      ___________________________________    SYNCOPE AND COLLAPSE:   Monitored the patient who reports almost passing out 3 times when outside, feeling dizzy, needing to lie down, and collapsing in the ballroom.   Dora experiences these episodes after about 15 minutes of working outside, accompanied by feeling super hot and having a dry mouth.   During episodes, patient feels weak and "not herself," but denies vision changes before episodes.   Referred to Neurology for evaluation of dizziness, near-fainting episodes, and headaches.   Ordered repeat EKG to check heart " rhythm.    IRREGULAR MENSTRUATION:   Monitored changes in patient's menstrual pattern, including unusual clotting and shorter duration (changed from 6-7 days to 4-5 days).   Ordered repeat blood counts and iron levels to check for changes related to menstrual flow.   Discussed relationship between iron levels, menstrual flow, and potential anemia.    PAIN IN RIGHT ANKLE AND FOOT:   Monitored patient's posterior ankle pain with history of tendon replacement surgery.   Pain affects posture and hip, requiring warming up to avoid discomfort.   Advised patient to continue physical therapy exercises at home due to schedule constraints.   May continue ibuprofen as needed for ankle pain during events.    PAIN IN RIGHT HIP:   Monitored patient's hip pain affecting posture, requiring warming up to avoid discomfort.   Dora reports hip tightness when walking or standing, especially without stretching.   Advised patient to continue physical therapy exercises at home due to schedule constraints.    PREDIABETES:   Monitored patient's Hemoglobin A1C which increased to 6.1, indicating prediabetes.   Explained importance of limiting carbohydrate and sugar intake to manage prediabetes risk and improve energy levels.    IRON DEFICIENCY:   Monitored patient's serum iron which is low, likely related to menstrual flow and clots, although RBC count is normal.   Ferritin level was 38, within normal range ().   Ordered repeat blood counts and iron levels to check for changes related to menstrual flow.   Advised patient to continue taking liquid multivitamin and iron supplement.    VITAMIN D INSUFFICIENCY:   Monitored patient's vitamin D level which is low but has improved slightly with supplementation.   Prescribed vitamin D 50,000 IU weekly with 3 months of refills.   Advised patient to continue taking supplements to improve levels.    FAMILY HISTORY OF STROKE:   Noted that patient reports her father has had strokes and seizures in  the last 3 years.    FAMILY HISTORY OF HEART DISEASE:   Noted that patient reports her father had some cardiac issues in the last 3 years.    FOLLOW-UP:   Follow up in 4 months.         _________________________    Janna Centeno MD, PharmD  [facility]  American Board of Obesity Medicine diplomate  Office [phone]

## 2025-06-15 PROBLEM — E66.01 MORBID OBESITY WITH BMI OF 40.0-44.9, ADULT: Status: ACTIVE | Noted: 2025-06-15

## 2025-08-03 ENCOUNTER — PATIENT MESSAGE (OUTPATIENT)
Dept: INTERNAL MEDICINE | Facility: CLINIC | Age: 35
End: 2025-08-03
Payer: COMMERCIAL

## (undated) DEVICE — DRESSING XEROFORM NONADH 1X8IN

## (undated) DEVICE — ELECTRODE REM PLYHSV RETURN 9

## (undated) DEVICE — SUT ETHILON 3/0 18IN PS-1

## (undated) DEVICE — TOURNIQUET SB QC DP 34X4IN

## (undated) DEVICE — DRAPE TOP 53X102IN

## (undated) DEVICE — DRAPE C-ARMOR EQUIPMENT COVER

## (undated) DEVICE — Device

## (undated) DEVICE — COVER LIGHT HANDLE 80/CA

## (undated) DEVICE — GLOVE BIOGEL PI MICRO SZ 7.5

## (undated) DEVICE — 2.0 DRILL BIT

## (undated) DEVICE — STOCKINETTE DBL PLY ST 4X

## (undated) DEVICE — SUT VICRYL 3-0 27 SH

## (undated) DEVICE — HOSE DUAL W/CPC CONNECTORS

## (undated) DEVICE — IMPLANTABLE DEVICE
Type: IMPLANTABLE DEVICE | Site: FOOT | Status: NON-FUNCTIONAL
Removed: 2024-06-26

## (undated) DEVICE — BLADE MICRO REC SMALL CROSS

## (undated) DEVICE — SPONGE LAP 18X18 PREWASHED

## (undated) DEVICE — SPONGE COTTON TRAY 4X4IN

## (undated) DEVICE — DRAPE T EXTRM SURG 121X128X90

## (undated) DEVICE — PADDING CAST 4IN SPECIALIST

## (undated) DEVICE — TRAY MINOR ORTHO OMC

## (undated) DEVICE — BLADE SURG #15 CARBON STEEL

## (undated) DEVICE — STOCKINETTE TUBULAR 2PL 6 X 4

## (undated) DEVICE — DRAPE C-ARM ELAS CLIP 42X120IN

## (undated) DEVICE — SPONGE GAUZE 16PLY 4X4

## (undated) DEVICE — BANDAGE ESMARK 6X12

## (undated) DEVICE — UNDERGLOVES BIOGEL PI SIZE 8

## (undated) DEVICE — BANDAGE MATRIX HK LOOP 6IN 5YD